# Patient Record
Sex: FEMALE | Race: WHITE | NOT HISPANIC OR LATINO | Employment: UNEMPLOYED | URBAN - METROPOLITAN AREA
[De-identification: names, ages, dates, MRNs, and addresses within clinical notes are randomized per-mention and may not be internally consistent; named-entity substitution may affect disease eponyms.]

---

## 2017-01-09 ENCOUNTER — TELEPHONE (OUTPATIENT)
Dept: OBSTETRICS AND GYNECOLOGY | Age: 22
End: 2017-01-09

## 2017-01-09 NOTE — TELEPHONE ENCOUNTER
----- Message from Laurel Chen sent at 1/9/2017  3:01 PM EST -----  Pt called earlier requesting samples of her BCP. She is on the Lo Loestrin. Please advise.     Pt#: [phone]

## 2017-01-09 NOTE — TELEPHONE ENCOUNTER
It is ok for pt to have sample of loloestrin, due for annual in march,please call pt and she may  if available

## 2017-01-09 NOTE — TELEPHONE ENCOUNTER
----- Message from Ligia Kirby sent at 1/9/2017 10:36 AM EST -----  Dr Starr pt, pt states her pharm is waiting on a PA for her norethindrone-ethinyl estradiol (LOESTRIN 1/20) and would like to know if we have any samples until it is filled. Pharm on file os accurate pls call pt at [phone]

## 2017-01-20 ENCOUNTER — OFFICE VISIT (OUTPATIENT)
Dept: CARDIOLOGY | Facility: CLINIC | Age: 22
End: 2017-01-20

## 2017-01-20 VITALS
SYSTOLIC BLOOD PRESSURE: 112 MMHG | DIASTOLIC BLOOD PRESSURE: 82 MMHG | BODY MASS INDEX: 34.91 KG/M2 | HEIGHT: 63 IN | WEIGHT: 197 LBS | HEART RATE: 70 BPM

## 2017-01-20 DIAGNOSIS — R00.2 PALPITATIONS: Primary | ICD-10-CM

## 2017-01-20 PROCEDURE — 99204 OFFICE O/P NEW MOD 45 MIN: CPT | Performed by: INTERNAL MEDICINE

## 2017-01-20 PROCEDURE — 93000 ELECTROCARDIOGRAM COMPLETE: CPT | Performed by: INTERNAL MEDICINE

## 2017-01-20 RX ORDER — BUDESONIDE AND FORMOTEROL FUMARATE DIHYDRATE 160; 4.5 UG/1; UG/1
2 AEROSOL RESPIRATORY (INHALATION)
COMMUNITY
End: 2018-09-24

## 2017-01-20 NOTE — Clinical Note
I accidentally ordered a [facility] holter.  Will you cancel that one and use the regular Holter order?

## 2017-01-20 NOTE — PROGRESS NOTES
"Date of Office Visit: 2017  Encounter Provider: Mk Lopez MD  Place of Service: [facility] CARDIOLOGY  Patient Name: Jana Nix  :1995    Chief Complaint   Patient presents with   • Irregular Heart Beat   :     HPI: Jana Nix is a 21 y.o. female who presents today for the evaluation of palpitations.  She has a long history of intermittent palpitations and was told as a teenager that she had PVC's.  She was evaluated by an adult cardiologist in May 2015, and had a Holter monitor which revealed rare PAC's/PVC's and on ventricular triplet.  She did not have symptoms while the monitor was on.  An echo was recommended but she was unable to get this done.    She has severe asthma, and her bronchodilators make her symptoms worse.  Her palpitations also get worse when her asthma symptoms flare.  She describes a fluttering \"anxious\" sensation in her chest.  She does not have sustained symptoms.  She denies chest pain or syncope.  She has shortness of breath due to her asthma.  Her palpitations come and go, and she has periods where she feels fine.      Past Medical History   Diagnosis Date   • Allergic rhinitis    • Anxiety    • Asthma    • Community acquired pneumonia      2016   • Depression    • PVC (premature ventricular contraction)    • Streptococcal pharyngitis      strep A       Past Surgical History   Procedure Laterality Date   •  section       x2       Social History     Social History   • Marital status: Single     Spouse name: N/A   • Number of children: N/A   • Years of education: N/A     Occupational History   • Not on file.     Social History Main Topics   • Smoking status: Never Smoker   • Smokeless tobacco: Never Used   • Alcohol use Yes      Comment: 1 drink a day   • Drug use: No   • Sexual activity: Not on file     Other Topics Concern   • Not on file     Social History Narrative       Family History   Problem Relation Age of Onset   • " "Hypertension Mother    • Myocarditis Paternal Grandfather    • Clotting disorder Paternal Grandfather        Review of Systems   Constitution: Positive for malaise/fatigue.   HENT: Positive for headaches.    Respiratory: Positive for shortness of breath, snoring and wheezing.    Neurological: Positive for excessive daytime sleepiness.   Psychiatric/Behavioral: Positive for depression. The patient is nervous/anxious.    All other systems reviewed and are negative.      No Known Allergies      Current Outpatient Prescriptions:   •  albuterol (PROVENTIL HFA;VENTOLIN HFA) 108 (90 BASE) MCG/ACT inhaler, Inhale 2 puffs every 4 (four) hours as needed for wheezing., Disp: 1 inhaler, Rfl: 0  •  budesonide-formoterol (SYMBICORT) 160-4.5 MCG/ACT inhaler, Inhale 2 puffs 2 (Two) Times a Day., Disp: , Rfl:   •  buPROPion (WELLBUTRIN) 100 MG tablet, Take 100 mg by mouth 2 (two) times a day., Disp: , Rfl:   •  norethindrone-ethinyl estradiol (LOESTRIN 1/20, 21,) 1-20 MG-MCG per tablet, Take 1 tablet by mouth Daily., Disp: 21 tablet, Rfl: 3  •  Vortioxetine HBr 10 MG tablet, Take  by mouth Daily., Disp: , Rfl:      Objective:     Vitals:    01/20/17 1059   BP: 112/82   Pulse: 70   Weight: 197 lb (89.4 kg)   Height: 63\" (160 cm)     Body mass index is 34.9 kg/(m^2).    Physical Exam   Constitutional: She is oriented to person, place, and time. She appears well-developed and well-nourished.   HENT:   Head: Normocephalic.   Nose: Nose normal.   Mouth/Throat: Oropharynx is clear and moist.   Eyes: Conjunctivae and EOM are normal. Pupils are equal, round, and reactive to light.   Neck: Normal range of motion. No JVD present.   Cardiovascular: Normal rate, regular rhythm, normal heart sounds and intact distal pulses.    No murmur heard.  Pulmonary/Chest: Effort normal and breath sounds normal.   Abdominal: Soft. She exhibits no mass. There is no tenderness.   Musculoskeletal: Normal range of motion. She exhibits no edema. "   Lymphadenopathy:     She has no cervical adenopathy.   Neurological: She is alert and oriented to person, place, and time. No cranial nerve deficit.   Skin: Skin is warm and dry. No rash noted.   Psychiatric: She has a normal mood and affect. Her behavior is normal. Judgment and thought content normal.   Vitals reviewed.        ECG 12 Lead  Date/Time: 1/20/2017 2:05 PM  Performed by: MK LOPEZ  Authorized by: MK LOPEZ   Comparison: compared with previous ECG   Similar to previous ECG  Rhythm: sinus rhythm  Conduction: conduction normal  ST Segments: ST segments normal  T Waves: T waves normal  Other: no other findings  Clinical impression: normal ECG              Assessment:       Diagnosis Plan   1. Palpitations     Adult Transthoracic Echo Complete    Holter Monitor - 24 Hour          Plan:       Ms. Nix has intermittent palpitations which sound like benign ectopics.  I am going to check an echo and a Holter.  If these are unremarkable, then I recommend observation only.  I explained that I generally don't recommend medical therapy for these as the side effects can be worse than the palpitations.  If she did require medical therapy, I would not use a beta blocker due to her asthma.    Her bronchodilators may worsen her palpitations, but they're very necessary.      Sincerely,       Mk Lopez MD

## 2017-01-20 NOTE — LETTER
"2017     Braeden Rider MD  [address]  [facility] [address]    Patient: Jana Nix   YOB: 1995   Date of Visit: 2017       Dear Dr. Adry MD:    Thank you for referring Jana Nix to me for evaluation. Below are the relevant portions of my assessment and plan of care.    If you have questions, please do not hesitate to call me. I look forward to following Jana along with you.         Sincerely,        Mk Lopez MD        CC: No Recipients  Mk Lopez MD  2017  2:09 PM  Signed  Date of Office Visit: 2017  Encounter Provider: Mk Lopez MD  Place of Service: [facility] CARDIOLOGY  Patient Name: Jana Nix  :1995    Chief Complaint   Patient presents with   • Irregular Heart Beat   :     HPI: Jana Nix is a 21 y.o. female who presents today for the evaluation of palpitations.  She has a long history of intermittent palpitations and was told as a teenager that she had PVC's.  She was evaluated by an adult cardiologist in May 2015, and had a Holter monitor which revealed rare PAC's/PVC's and on ventricular triplet.  She did not have symptoms while the monitor was on.  An echo was recommended but she was unable to get this done.    She has severe asthma, and her bronchodilators make her symptoms worse.  Her palpitations also get worse when her asthma symptoms flare.  She describes a fluttering \"anxious\" sensation in her chest.  She does not have sustained symptoms.  She denies chest pain or syncope.  She has shortness of breath due to her asthma.  Her palpitations come and go, and she has periods where she feels fine.      Past Medical History   Diagnosis Date   • Allergic rhinitis    • Anxiety    • Asthma    • Community acquired pneumonia      2016   • Depression    • PVC (premature ventricular contraction)    • Streptococcal pharyngitis      strep A       Past Surgical History   " "  Procedure Laterality Date   •  section       x2       Social History     Social History   • Marital status: Single     Spouse name: N/A   • Number of children: N/A   • Years of education: N/A     Occupational History   • Not on file.     Social History Main Topics   • Smoking status: Never Smoker   • Smokeless tobacco: Never Used   • Alcohol use Yes      Comment: 1 drink a day   • Drug use: No   • Sexual activity: Not on file     Other Topics Concern   • Not on file     Social History Narrative       Family History   Problem Relation Age of Onset   • Hypertension Mother    • Myocarditis Paternal Grandfather    • Clotting disorder Paternal Grandfather        Review of Systems   Constitution: Positive for malaise/fatigue.   HENT: Positive for headaches.    Respiratory: Positive for shortness of breath, snoring and wheezing.    Neurological: Positive for excessive daytime sleepiness.   Psychiatric/Behavioral: Positive for depression. The patient is nervous/anxious.    All other systems reviewed and are negative.      No Known Allergies      Current Outpatient Prescriptions:   •  albuterol (PROVENTIL HFA;VENTOLIN HFA) 108 (90 BASE) MCG/ACT inhaler, Inhale 2 puffs every 4 (four) hours as needed for wheezing., Disp: 1 inhaler, Rfl: 0  •  budesonide-formoterol (SYMBICORT) 160-4.5 MCG/ACT inhaler, Inhale 2 puffs 2 (Two) Times a Day., Disp: , Rfl:   •  buPROPion (WELLBUTRIN) 100 MG tablet, Take 100 mg by mouth 2 (two) times a day., Disp: , Rfl:   •  norethindrone-ethinyl estradiol (LOESTRIN 1/20, 21,) 1-20 MG-MCG per tablet, Take 1 tablet by mouth Daily., Disp: 21 tablet, Rfl: 3  •  Vortioxetine HBr 10 MG tablet, Take  by mouth Daily., Disp: , Rfl:      Objective:     Vitals:    17 1059   BP: 112/82   Pulse: 70   Weight: 197 lb (89.4 kg)   Height: 63\" (160 cm)     Body mass index is 34.9 kg/(m^2).    Physical Exam   Constitutional: She is oriented to person, place, and time. She appears well-developed and " well-nourished.   HENT:   Head: Normocephalic.   Nose: Nose normal.   Mouth/Throat: Oropharynx is clear and moist.   Eyes: Conjunctivae and EOM are normal. Pupils are equal, round, and reactive to light.   Neck: Normal range of motion. No JVD present.   Cardiovascular: Normal rate, regular rhythm, normal heart sounds and intact distal pulses.    No murmur heard.  Pulmonary/Chest: Effort normal and breath sounds normal.   Abdominal: Soft. She exhibits no mass. There is no tenderness.   Musculoskeletal: Normal range of motion. She exhibits no edema.   Lymphadenopathy:     She has no cervical adenopathy.   Neurological: She is alert and oriented to person, place, and time. No cranial nerve deficit.   Skin: Skin is warm and dry. No rash noted.   Psychiatric: She has a normal mood and affect. Her behavior is normal. Judgment and thought content normal.   Vitals reviewed.        ECG 12 Lead  Date/Time: 1/20/2017 2:05 PM  Performed by: MK LOPEZ  Authorized by: MK LOPEZ   Comparison: compared with previous ECG   Similar to previous ECG  Rhythm: sinus rhythm  Conduction: conduction normal  ST Segments: ST segments normal  T Waves: T waves normal  Other: no other findings  Clinical impression: normal ECG              Assessment:       Diagnosis Plan   1. Palpitations     Adult Transthoracic Echo Complete    Holter Monitor - 24 Hour          Plan:       Ms. Nix has intermittent palpitations which sound like benign ectopics.  I am going to check an echo and a Holter.  If these are unremarkable, then I recommend observation only.  I explained that I generally don't recommend medical therapy for these as the side effects can be worse than the palpitations.  If she did require medical therapy, I would not use a beta blocker due to her asthma.    Her bronchodilators may worsen her palpitations, but they're very necessary.      Sincerely,       Mk Lopez MD

## 2017-01-31 ENCOUNTER — APPOINTMENT (OUTPATIENT)
Dept: CARDIOLOGY | Facility: HOSPITAL | Age: 22
End: 2017-01-31
Attending: INTERNAL MEDICINE

## 2017-01-31 DIAGNOSIS — R00.2 PALPITATIONS: Primary | ICD-10-CM

## 2017-03-14 ENCOUNTER — OFFICE VISIT (OUTPATIENT)
Dept: OBSTETRICS AND GYNECOLOGY | Age: 22
End: 2017-03-14

## 2017-03-14 VITALS
WEIGHT: 195 LBS | SYSTOLIC BLOOD PRESSURE: 118 MMHG | BODY MASS INDEX: 33.29 KG/M2 | HEIGHT: 64 IN | DIASTOLIC BLOOD PRESSURE: 72 MMHG

## 2017-03-14 DIAGNOSIS — Z30.41 ENCOUNTER FOR SURVEILLANCE OF CONTRACEPTIVE PILLS: ICD-10-CM

## 2017-03-14 DIAGNOSIS — Z11.3 SCREEN FOR STD (SEXUALLY TRANSMITTED DISEASE): ICD-10-CM

## 2017-03-14 DIAGNOSIS — R35.0 URINARY FREQUENCY: ICD-10-CM

## 2017-03-14 DIAGNOSIS — Z01.419 ENCOUNTER FOR GYNECOLOGICAL EXAMINATION WITHOUT ABNORMAL FINDING: Primary | ICD-10-CM

## 2017-03-14 LAB
B-HCG UR QL: NEGATIVE
INTERNAL NEGATIVE CONTROL: NEGATIVE
INTERNAL POSITIVE CONTROL: POSITIVE
Lab: NORMAL

## 2017-03-14 PROCEDURE — 81025 URINE PREGNANCY TEST: CPT | Performed by: OBSTETRICS & GYNECOLOGY

## 2017-03-14 PROCEDURE — 99395 PREV VISIT EST AGE 18-39: CPT | Performed by: OBSTETRICS & GYNECOLOGY

## 2017-03-14 RX ORDER — NORETHINDRONE ACETATE AND ETHINYL ESTRADIOL AND FERROUS FUMARATE 1MG-20(24)
1 KIT ORAL DAILY
Qty: 28 TABLET | Refills: 12 | Status: SHIPPED | OUTPATIENT
Start: 2017-03-14 | End: 2018-03-14

## 2017-03-14 NOTE — PROGRESS NOTES
"Subjective     Chief Complaint   Patient presents with   • Annual Exam     PT DOING WELL, HERE FOR ANNUAL EXAM.       History of Present Illness    Jana Nix is a 22 y.o.  who presents for annual exam.  Menses are irregular with loloestrin, occasionally misses cycle but then will have breakthrough bleeding, likes ocp's and wants to continue but may want to try another pill  Obstetric History:  OB History      Para Term  AB TAB SAB Ectopic Multiple Living    2 2 1       2         Menstrual History:     Patient's last menstrual period was 2017 (exact date).         Current contraception: OCP (estrogen/progesterone)  History of abnormal Pap smear: no  Received Gardasil immunization: yes -    Perform regular self breast exam: yes -    Family history of uterine or ovarian cancer: no  Family History of colon cancer: no  Family history of breast cancer: no    Mammogram: not indicated.  Colonoscopy: not indicated.  DEXA: not indicated.    Exercise: moderately active      The following portions of the patient's history were reviewed and updated as appropriate: allergies, current medications, past family history, past medical history, past social history, past surgical history and problem list.    Review of Systems    A comprehensive review of systems was negative except for: Genitourinary: positive for frequency and irregular menses with loloestrin     Objective   Physical Exam    Visit Vitals   • /72   • Ht 64\" (162.6 cm)   • Wt 195 lb (88.5 kg)   • LMP 2017 (Exact Date)   • Breastfeeding No   • BMI 33.47 kg/m2       General:   alert, appears stated age, cooperative and no distress   Neck: no asymmetry, masses, or scars and thyroid normal to palpation   Heart: regular rate and rhythm, S1, S2 normal, no murmur, click, rub or gallop   Lungs: clear to auscultation bilaterally   Abdomen: soft, non-tender, without masses or organomegaly   Breast: inspection negative, no nipple " discharge or bleeding, no masses or nodularity palpable   Vulva: normal, Bartholin's, Urethra, Gasquet's normal   Vagina: normal mucosa, normal discharge   Cervix: no lesions   Uterus: normal size, mobile, non-tender, normal shape and consistency   Adnexa: normal adnexa and no mass, fullness, tenderness   Rectal: not indicated     Assessment/Plan   Jana was seen today for annual exam.    Diagnoses and all orders for this visit:    Encounter for gynecological examination without abnormal finding  -     IGP, CtNg, Rfx Aptima HPV ASCU    Screen for STD (sexually transmitted disease)  -     IGP, CtNg, Rfx Aptima HPV ASCU    Encounter for surveillance of contraceptive pills  -     norethindrone-ethinyl estradiol-ferrous fumarate (LOESTIN 24 FE) 1-20 MG-MCG(24) per tablet; Take 1 tablet by mouth Daily.    Urinary frequency  -     Urine Culture        All questions answered.  Breast self exam technique reviewed and patient encouraged to perform self-exam monthly.  Discussed healthy lifestyle modifications.  Recommended 30 minutes of aerobic exercise five times per week.    Pt aware that ocp's have risks of DVT/PE/CVE/HTN and gallbladder disease, will try lo estrin 24 and follow

## 2017-03-16 ENCOUNTER — TELEPHONE (OUTPATIENT)
Dept: OBSTETRICS AND GYNECOLOGY | Age: 22
End: 2017-03-16

## 2017-03-16 LAB
BACTERIA UR CULT: NORMAL
BACTERIA UR CULT: NORMAL

## 2017-03-16 NOTE — TELEPHONE ENCOUNTER
----- Message from Angeline Patel MD sent at 3/16/2017  6:59 AM EDT -----  Urine culture negative, routed to call room to notify pt

## 2017-03-17 LAB
C TRACH RRNA CVX QL NAA+PROBE: NEGATIVE
CONV .: NORMAL
CYTOLOGIST CVX/VAG CYTO: NORMAL
CYTOLOGY CVX/VAG DOC THIN PREP: NORMAL
DX ICD CODE: NORMAL
HIV 1 & 2 AB SER-IMP: NORMAL
N GONORRHOEA RRNA CVX QL NAA+PROBE: NEGATIVE
OTHER STN SPEC: NORMAL
PATH REPORT.FINAL DX SPEC: NORMAL
STAT OF ADQ CVX/VAG CYTO-IMP: NORMAL

## 2017-03-20 ENCOUNTER — TELEPHONE (OUTPATIENT)
Dept: OBSTETRICS AND GYNECOLOGY | Age: 22
End: 2017-03-20

## 2017-03-20 NOTE — TELEPHONE ENCOUNTER
----- Message from Angeline Patel MD sent at 3/17/2017  6:26 PM EDT -----  Pap and cultures are negative, routed to MA to notify pt

## 2017-05-05 RX ORDER — AZITHROMYCIN 250 MG/1
TABLET, FILM COATED ORAL
Qty: 6 TABLET | Refills: 0 | Status: SHIPPED | OUTPATIENT
Start: 2017-05-05 | End: 2018-08-29

## 2017-06-20 ENCOUNTER — TELEPHONE (OUTPATIENT)
Dept: OBSTETRICS AND GYNECOLOGY | Age: 22
End: 2017-06-20

## 2017-06-20 NOTE — TELEPHONE ENCOUNTER
I tried to call and no answer, please advise pt when she calls back that she will have to wait for next menses to re-start ocp's, advise she abstain from intercourse and call for appt if she fails to re-start, check preg test and call if positive

## 2017-06-20 NOTE — TELEPHONE ENCOUNTER
Pt states she has been on Loestrin 24 FE since March and has not had a period for the last 2 months. Pt states she was a week late picking up her OCP this month and has not had any spotting (has not started yet, advised pt not to start right now) pt has taken a UPT today that was negative. States very irreg periods and not sure when to start pack. Please advise.    Pt # [phone]

## 2017-06-27 RX ORDER — DOXYCYCLINE HYCLATE 100 MG/1
100 CAPSULE ORAL 2 TIMES DAILY
Qty: 20 CAPSULE | Refills: 0 | Status: SHIPPED | OUTPATIENT
Start: 2017-06-27 | End: 2018-08-29

## 2017-07-14 ENCOUNTER — TELEPHONE (OUTPATIENT)
Dept: OBSTETRICS AND GYNECOLOGY | Age: 22
End: 2017-07-14

## 2017-07-14 ENCOUNTER — APPOINTMENT (OUTPATIENT)
Dept: LAB | Facility: HOSPITAL | Age: 22
End: 2017-07-14

## 2017-07-14 DIAGNOSIS — N92.6 MISSED MENSES: Primary | ICD-10-CM

## 2017-07-14 PROCEDURE — 84702 CHORIONIC GONADOTROPIN TEST: CPT | Performed by: OBSTETRICS & GYNECOLOGY

## 2017-07-14 PROCEDURE — 36415 COLL VENOUS BLD VENIPUNCTURE: CPT | Performed by: OBSTETRICS & GYNECOLOGY

## 2017-07-14 NOTE — TELEPHONE ENCOUNTER
Pt was on ocp's but forgot to re-start her pack in late June and so had to wait for next cycle to start. She has not had cycle yet. She has hx of regular menses except when she was on nexplanon. Advised she come in for serum hcg today, she has had negative urine tests at home.

## 2017-07-16 LAB — HCG INTACT+B SERPL-ACNC: <1 MIU/ML

## 2017-07-17 ENCOUNTER — TELEPHONE (OUTPATIENT)
Dept: OBSTETRICS AND GYNECOLOGY | Age: 22
End: 2017-07-17

## 2017-07-17 NOTE — TELEPHONE ENCOUNTER
----- Message from Angeline Patel MD sent at 7/16/2017  8:43 AM EDT -----  Call pt, serum hcg is negative

## 2017-08-14 RX ORDER — NORETHINDRONE ACETATE AND ETHINYL ESTRADIOL 1; 20 MG/1; UG/1
TABLET ORAL
Qty: 21 TABLET | Refills: 2 | Status: SHIPPED | OUTPATIENT
Start: 2017-08-14 | End: 2017-11-14 | Stop reason: SDUPTHER

## 2017-08-29 RX ORDER — AZITHROMYCIN 250 MG/1
TABLET, FILM COATED ORAL
Qty: 6 TABLET | Refills: 0 | Status: SHIPPED | OUTPATIENT
Start: 2017-08-29 | End: 2018-08-29

## 2017-11-14 RX ORDER — NORETHINDRONE ACETATE AND ETHINYL ESTRADIOL 1; 20 MG/1; UG/1
TABLET ORAL
Qty: 21 TABLET | Refills: 1 | Status: SHIPPED | OUTPATIENT
Start: 2017-11-14 | End: 2018-02-05 | Stop reason: SDUPTHER

## 2018-02-06 RX ORDER — NORETHINDRONE ACETATE AND ETHINYL ESTRADIOL 1; 20 MG/1; UG/1
TABLET ORAL
Qty: 21 TABLET | Refills: 0 | Status: SHIPPED | OUTPATIENT
Start: 2018-02-06 | End: 2018-03-07 | Stop reason: SDUPTHER

## 2018-03-07 RX ORDER — NORETHINDRONE ACETATE AND ETHINYL ESTRADIOL 1; 20 MG/1; UG/1
TABLET ORAL
Qty: 21 TABLET | Refills: 2 | Status: SHIPPED | OUTPATIENT
Start: 2018-03-07 | End: 2018-09-24

## 2018-05-18 ENCOUNTER — TRANSCRIBE ORDERS (OUTPATIENT)
Dept: LAB | Facility: HOSPITAL | Age: 23
End: 2018-05-18

## 2018-05-18 ENCOUNTER — LAB (OUTPATIENT)
Dept: LAB | Facility: HOSPITAL | Age: 23
End: 2018-05-18

## 2018-05-18 DIAGNOSIS — B35.1 DERMATOPHYTOSIS OF NAIL: Primary | ICD-10-CM

## 2018-05-18 DIAGNOSIS — B35.1 DERMATOPHYTOSIS OF NAIL: ICD-10-CM

## 2018-05-18 LAB
ALT SERPL W P-5'-P-CCNC: 10 U/L (ref 1–33)
AST SERPL-CCNC: 18 U/L (ref 1–32)
CREAT BLD-MCNC: 0.81 MG/DL (ref 0.57–1)
GFR SERPL CREATININE-BSD FRML MDRD: 88 ML/MIN/1.73

## 2018-05-18 PROCEDURE — 36415 COLL VENOUS BLD VENIPUNCTURE: CPT

## 2018-05-18 PROCEDURE — 84450 TRANSFERASE (AST) (SGOT): CPT

## 2018-05-18 PROCEDURE — 82565 ASSAY OF CREATININE: CPT

## 2018-05-18 PROCEDURE — 84460 ALANINE AMINO (ALT) (SGPT): CPT

## 2018-06-26 DIAGNOSIS — R53.83 OTHER FATIGUE: ICD-10-CM

## 2018-06-26 DIAGNOSIS — E16.2 HYPOGLYCEMIA: Primary | ICD-10-CM

## 2018-06-27 LAB
ALBUMIN SERPL-MCNC: 4.4 G/DL (ref 3.5–5.2)
ALBUMIN/GLOB SERPL: 2 G/DL
ALP SERPL-CCNC: 82 U/L (ref 39–117)
ALT SERPL-CCNC: 13 U/L (ref 1–33)
AST SERPL-CCNC: 7 U/L (ref 1–32)
BASOPHILS # BLD AUTO: 0.02 10*3/MM3 (ref 0–0.2)
BASOPHILS NFR BLD AUTO: 0.2 % (ref 0–1.5)
BILIRUB SERPL-MCNC: 0.3 MG/DL (ref 0.1–1.2)
BUN SERPL-MCNC: 9 MG/DL (ref 6–20)
BUN/CREAT SERPL: 12.2 (ref 7–25)
CALCIUM SERPL-MCNC: 9.4 MG/DL (ref 8.6–10.5)
CHLORIDE SERPL-SCNC: 102 MMOL/L (ref 98–107)
CO2 SERPL-SCNC: 26.9 MMOL/L (ref 22–29)
CREAT SERPL-MCNC: 0.74 MG/DL (ref 0.57–1)
EOSINOPHIL # BLD AUTO: 0.87 10*3/MM3 (ref 0–0.7)
EOSINOPHIL NFR BLD AUTO: 10.2 % (ref 0.3–6.2)
ERYTHROCYTE [DISTWIDTH] IN BLOOD BY AUTOMATED COUNT: 13.2 % (ref 11.7–13)
GLOBULIN SER CALC-MCNC: 2.2 GM/DL
GLUCOSE SERPL-MCNC: 87 MG/DL (ref 65–99)
HBA1C MFR BLD: 5 % (ref 4.8–5.6)
HCT VFR BLD AUTO: 39.2 % (ref 35.6–45.5)
HGB BLD-MCNC: 13.2 G/DL (ref 11.9–15.5)
IMM GRANULOCYTES # BLD: 0.02 10*3/MM3 (ref 0–0.03)
IMM GRANULOCYTES NFR BLD: 0.2 % (ref 0–0.5)
LYMPHOCYTES # BLD AUTO: 2.47 10*3/MM3 (ref 0.9–4.8)
LYMPHOCYTES NFR BLD AUTO: 29.1 % (ref 19.6–45.3)
MCH RBC QN AUTO: 28.6 PG (ref 26.9–32)
MCHC RBC AUTO-ENTMCNC: 33.7 G/DL (ref 32.4–36.3)
MCV RBC AUTO: 84.8 FL (ref 80.5–98.2)
MONOCYTES # BLD AUTO: 0.58 10*3/MM3 (ref 0.2–1.2)
MONOCYTES NFR BLD AUTO: 6.8 % (ref 5–12)
NEUTROPHILS # BLD AUTO: 4.56 10*3/MM3 (ref 1.9–8.1)
NEUTROPHILS NFR BLD AUTO: 53.7 % (ref 42.7–76)
PLATELET # BLD AUTO: 300 10*3/MM3 (ref 140–500)
POTASSIUM SERPL-SCNC: 3.9 MMOL/L (ref 3.5–5.2)
PROT SERPL-MCNC: 6.6 G/DL (ref 6–8.5)
RBC # BLD AUTO: 4.62 10*6/MM3 (ref 3.9–5.2)
SODIUM SERPL-SCNC: 142 MMOL/L (ref 136–145)
T4 SERPL-MCNC: 8.08 MCG/DL (ref 4.5–11.7)
TSH SERPL DL<=0.005 MIU/L-ACNC: 0.93 MIU/ML (ref 0.27–4.2)
WBC # BLD AUTO: 8.5 10*3/MM3 (ref 4.5–10.7)

## 2018-07-01 ENCOUNTER — RESULTS ENCOUNTER (OUTPATIENT)
Dept: INTERNAL MEDICINE | Facility: CLINIC | Age: 23
End: 2018-07-01

## 2018-07-01 DIAGNOSIS — E16.2 HYPOGLYCEMIA: ICD-10-CM

## 2018-07-01 DIAGNOSIS — R53.83 OTHER FATIGUE: ICD-10-CM

## 2018-08-29 RX ORDER — AZITHROMYCIN 250 MG/1
TABLET, FILM COATED ORAL
Qty: 6 TABLET | Refills: 0 | Status: SHIPPED | OUTPATIENT
Start: 2018-08-29 | End: 2018-09-24

## 2018-09-04 RX ORDER — DOXYCYCLINE HYCLATE 100 MG/1
100 CAPSULE ORAL 2 TIMES DAILY
Qty: 20 CAPSULE | Refills: 0 | Status: SHIPPED | OUTPATIENT
Start: 2018-09-04 | End: 2018-09-24

## 2018-09-12 DIAGNOSIS — J45.909 UNCOMPLICATED ASTHMA: ICD-10-CM

## 2018-09-19 DIAGNOSIS — J45.909 UNCOMPLICATED ASTHMA: ICD-10-CM

## 2018-09-24 ENCOUNTER — TELEPHONE (OUTPATIENT)
Dept: OBSTETRICS AND GYNECOLOGY | Age: 23
End: 2018-09-24

## 2018-09-24 DIAGNOSIS — J45.909 UNCOMPLICATED ASTHMA: ICD-10-CM

## 2018-09-24 RX ORDER — ASCORBIC ACID, CALCIUM CITRATE, IRON, VITAMIN D, DL- ALPHA- TOCOPHEROL ACETATE, THIAMINE, RIBOFLAVIN, NIACINAMIDE, PYRIDOXINE HYDROCHLORIDE, FOLIC ACID, IODINE, ZINC, COPPER, DOCUSATE SODIUM, DOCONEXENT AND ICOSAPENT
1 KIT DAILY
Qty: 30 TABLET | Refills: 12 | Status: SHIPPED | OUTPATIENT
Start: 2018-09-24 | End: 2019-09-24

## 2018-09-24 RX ORDER — ALBUTEROL SULFATE 90 UG/1
2 AEROSOL, METERED RESPIRATORY (INHALATION)
COMMUNITY
End: 2018-12-18 | Stop reason: ALTCHOICE

## 2018-09-24 RX ORDER — LORATADINE 10 MG/1
10 TABLET ORAL
COMMUNITY
End: 2021-07-06

## 2018-09-24 NOTE — TELEPHONE ENCOUNTER
Called pt to verify medication. Since preg she notes only taking asthma medication, no psych medication or antibiotics. She denies any issues currently and has appt next week

## 2018-09-25 ENCOUNTER — TELEPHONE (OUTPATIENT)
Dept: OBSTETRICS AND GYNECOLOGY | Age: 23
End: 2018-09-25

## 2018-09-25 NOTE — TELEPHONE ENCOUNTER
Dr Starr Rx'd citranatal pre lisa vits. Pt is inquiring if there was a specific reason to be on this pnv, ins will cover and pt did get a coupon but cost is 20.00. Please advise

## 2018-09-25 NOTE — TELEPHONE ENCOUNTER
No, pt requested we send a rx and this is often preferred of the branded PNV; it also includes DHA

## 2018-10-01 ENCOUNTER — PROCEDURE VISIT (OUTPATIENT)
Dept: OBSTETRICS AND GYNECOLOGY | Age: 23
End: 2018-10-01

## 2018-10-01 ENCOUNTER — OFFICE VISIT (OUTPATIENT)
Dept: OBSTETRICS AND GYNECOLOGY | Age: 23
End: 2018-10-01

## 2018-10-01 VITALS
WEIGHT: 195 LBS | SYSTOLIC BLOOD PRESSURE: 120 MMHG | HEIGHT: 64 IN | DIASTOLIC BLOOD PRESSURE: 70 MMHG | BODY MASS INDEX: 33.29 KG/M2

## 2018-10-01 DIAGNOSIS — Z11.3 SCREEN FOR STD (SEXUALLY TRANSMITTED DISEASE): ICD-10-CM

## 2018-10-01 DIAGNOSIS — O36.80X0 ENCOUNTER TO DETERMINE FETAL VIABILITY OF PREGNANCY, SINGLE OR UNSPECIFIED FETUS: Primary | ICD-10-CM

## 2018-10-01 DIAGNOSIS — Z13.29 THYROID DISORDER SCREEN: ICD-10-CM

## 2018-10-01 DIAGNOSIS — Z01.419 NORMAL GYNECOLOGIC EXAMINATION: Primary | ICD-10-CM

## 2018-10-01 DIAGNOSIS — Z34.90 PREGNANCY, UNSPECIFIED GESTATIONAL AGE: ICD-10-CM

## 2018-10-01 PROBLEM — O34.219 PREVIOUS CESAREAN DELIVERY AFFECTING PREGNANCY: Status: ACTIVE | Noted: 2018-10-01

## 2018-10-01 PROBLEM — O14.90 PRE-ECLAMPSIA, ANTEPARTUM: Status: ACTIVE | Noted: 2018-10-01

## 2018-10-01 LAB
C TRACH RRNA SPEC DONR QL NAA+PROBE: NEGATIVE
N GONORRHOEA DNA SPEC QL NAA+PROBE: NEGATIVE

## 2018-10-01 PROCEDURE — 99395 PREV VISIT EST AGE 18-39: CPT | Performed by: OBSTETRICS & GYNECOLOGY

## 2018-10-01 PROCEDURE — 76817 TRANSVAGINAL US OBSTETRIC: CPT | Performed by: OBSTETRICS & GYNECOLOGY

## 2018-10-01 PROCEDURE — 99213 OFFICE O/P EST LOW 20 MIN: CPT | Performed by: OBSTETRICS & GYNECOLOGY

## 2018-10-01 NOTE — PROGRESS NOTES
"Subjective     Chief Complaint   Patient presents with   • Gynecologic Exam     pregnancy confirmation       History of Present Illness    Jana Nix is a 23 y.o.  who presents for preg confirmation and is also due for annual exam.  Her LMP is 18 and she has had some mild n/v; she denies pelvic pain or bleeding  Obstetric History:  OB History      Para Term  AB Living    2 2 1     2    SAB TAB Ectopic Molar Multiple Live Births              2         Menstrual History:     Patient's last menstrual period was 2018 (exact date).         Current contraception: none/ pregnant  History of abnormal Pap smear: no  Received Gardasil immunization: no  Perform regular self breast exam: no  Family history of uterine or ovarian cancer: no  Family History of colon cancer: no  Family history of breast cancer: yes - great aunts    Mammogram: not indicated.  Colonoscopy: not indicated.  DEXA: not indicated.    Exercise: moderately active  Calcium/Vitamin D: adequate intake    The following portions of the patient's history were reviewed and updated as appropriate: allergies, current medications, past family history, past medical history, past social history, past surgical history and problem list.    Review of Systems    Review of Systems   Constitutional: Negative for fatigue.   Respiratory: Negative for shortness of breath.    Gastrointestinal: Negative for abdominal pain. pos for mild nausea  Genitourinary: Negative for dysuria.   Neurological: Negative for headaches.   Psychiatric/Behavioral: Negative for dysphoric mood.         Objective   Physical Exam    /70   Ht 162.6 cm (64\")   Wt 88.5 kg (195 lb)   LMP 2018 (Exact Date)   BMI 33.47 kg/m²     General:   alert, appears stated age, cooperative and no distress   Neck: no asymmetry, masses, or scars and thyroid normal to palpation   Heart: regular rate and rhythm, S1, S2 normal, no murmur, click, rub or gallop   Lungs: clear " to auscultation bilaterally   Abdomen: soft, non-tender, without masses or organomegaly   Breast: inspection negative, no nipple discharge or bleeding, no masses or nodularity palpable and no axillary adenopathy   Vulva: no lesions, normal Bartholins   Vagina: normal mucosa, normal discharge   Cervix: no lesions   Uterus: 7 weeks size   Adnexa: normal adnexa and no mass, fullness, tenderness   Rectal: not indicated     Assessment/Plan   Jana was seen today for gynecologic exam.    Diagnoses and all orders for this visit:    Normal gynecologic examination  -     IGP, CtNg, Rfx Aptima HPV ASCU    Screen for STD (sexually transmitted disease)  -     IGP, CtNg, Rfx Aptima HPV ASCU    Pregnancy, unspecified gestational age  -     Urine Culture - Urine, Urine, Clean Catch  -     OB Panel With HIV    Thyroid disorder screen  -     TSH        All questions answered.  Breast self exam technique reviewed and patient encouraged to perform self-exam monthly.  Discussed healthy lifestyle modifications.  Recommended 30 minutes of aerobic exercise five times per week.    Will proceed with u/s today to confirm dates  Recommend baby asa at 13+ weeks due to hx preeclampsia    Pt with hx of c/s X 2. Advised that we could offer repeat c/s. If she desires , she will research other MD offices that would offer with her history. Discussed risks of repeat c/s to include bleeding, infection, damage to organs, DVT/PE and even death. However, reviewed risks of . Pt will consider but currently plans to proceed here. Advised f/u 3 weeks for OB intake  Reviewed prenatal guidelines and zika warnings    Addendum: u/s done and IUP noted 8 weeks and dates confirmed

## 2018-10-02 LAB
ABO GROUP BLD: (no result)
BASOPHILS # BLD AUTO: 0 X10E3/UL (ref 0–0.2)
BASOPHILS NFR BLD AUTO: 0 %
BLD GP AB SCN SERPL QL: (no result)
BLD GP AB SCN SERPL QL: POSITIVE
BLOOD GROUP ANTIBODIES SERPL: ABNORMAL
EOSINOPHIL # BLD AUTO: 0.5 X10E3/UL (ref 0–0.4)
EOSINOPHIL NFR BLD AUTO: 6 %
ERYTHROCYTE [DISTWIDTH] IN BLOOD BY AUTOMATED COUNT: 14.8 % (ref 12.3–15.4)
HBV SURFACE AG SERPL QL IA: NEGATIVE
HCT VFR BLD AUTO: 38.1 % (ref 34–46.6)
HCV AB S/CO SERPL IA: <0.1 S/CO RATIO (ref 0–0.9)
HGB BLD-MCNC: 12.5 G/DL (ref 11.1–15.9)
HIV 1+2 AB+HIV1 P24 AG SERPL QL IA: NON REACTIVE
IMM GRANULOCYTES # BLD: 0 X10E3/UL (ref 0–0.1)
IMM GRANULOCYTES NFR BLD: 0 %
LYMPHOCYTES # BLD AUTO: 1.8 X10E3/UL (ref 0.7–3.1)
LYMPHOCYTES NFR BLD AUTO: 22 %
MCH RBC QN AUTO: 26.9 PG (ref 26.6–33)
MCHC RBC AUTO-ENTMCNC: 32.8 G/DL (ref 31.5–35.7)
MCV RBC AUTO: 82 FL (ref 79–97)
MONOCYTES # BLD AUTO: 0.5 X10E3/UL (ref 0.1–0.9)
MONOCYTES NFR BLD AUTO: 6 %
NEUTROPHILS # BLD AUTO: 5.3 X10E3/UL (ref 1.4–7)
NEUTROPHILS NFR BLD AUTO: 66 %
PLATELET # BLD AUTO: 237 X10E3/UL (ref 150–379)
RBC # BLD AUTO: 4.65 X10E6/UL (ref 3.77–5.28)
RH BLD: POSITIVE
RPR SER QL: NON REACTIVE
RUBV IGG SERPL IA-ACNC: 2.91 INDEX
TSH SERPL DL<=0.005 MIU/L-ACNC: 0.9 MIU/ML (ref 0.27–4.2)
WBC # BLD AUTO: 8.1 X10E3/UL (ref 3.4–10.8)
XXX BLOOD GROUP AB TITR SERPL AHG: ABNORMAL {TITER}

## 2018-10-03 ENCOUNTER — TELEPHONE (OUTPATIENT)
Dept: OBSTETRICS AND GYNECOLOGY | Age: 23
End: 2018-10-03

## 2018-10-03 DIAGNOSIS — O36.1990 MATERNAL ATYPICAL ANTIBODY AFFECTING PREGNANCY, ANTEPARTUM, SINGLE OR UNSPECIFIED FETUS: Primary | ICD-10-CM

## 2018-10-03 LAB
BACTERIA UR CULT: NORMAL
BACTERIA UR CULT: NORMAL
C TRACH RRNA CVX QL NAA+PROBE: NEGATIVE
CONV .: NORMAL
CYTOLOGIST CVX/VAG CYTO: NORMAL
CYTOLOGY CVX/VAG DOC THIN PREP: NORMAL
DX ICD CODE: NORMAL
HIV 1 & 2 AB SER-IMP: NORMAL
N GONORRHOEA RRNA CVX QL NAA+PROBE: NEGATIVE
OTHER STN SPEC: NORMAL
PATH REPORT.FINAL DX SPEC: NORMAL
STAT OF ADQ CVX/VAG CYTO-IMP: NORMAL

## 2018-10-03 NOTE — TELEPHONE ENCOUNTER
"Called pt and reviewed labs she is Antibody screen + for anti c. Reviewed that this can cause HDFN. Recommend repeat screen/titer with hospital blood bank and partner testing for antigen status and zygosity for \"c\". Pt understands and will have partner go in for testing. Orders placed for repeat testing at [facility].  "

## 2018-10-04 ENCOUNTER — TELEPHONE (OUTPATIENT)
Dept: OBSTETRICS AND GYNECOLOGY | Age: 23
End: 2018-10-04

## 2018-10-04 ENCOUNTER — APPOINTMENT (OUTPATIENT)
Dept: LAB | Facility: HOSPITAL | Age: 23
End: 2018-10-04

## 2018-10-04 LAB
A AB TITR SERPL: NORMAL {TITER}
ABO GROUP BLD: NORMAL
ANTI-LITTLE C: NORMAL
BLD GP AB SCN SERPL QL: POSITIVE
LITTLE C: NEGATIVE
RH BLD: POSITIVE
T&S EXPIRATION DATE: NORMAL

## 2018-10-04 PROCEDURE — 86870 RBC ANTIBODY IDENTIFICATION: CPT | Performed by: OBSTETRICS & GYNECOLOGY

## 2018-10-04 PROCEDURE — 86901 BLOOD TYPING SEROLOGIC RH(D): CPT | Performed by: OBSTETRICS & GYNECOLOGY

## 2018-10-04 PROCEDURE — 36415 COLL VENOUS BLD VENIPUNCTURE: CPT | Performed by: OBSTETRICS & GYNECOLOGY

## 2018-10-04 PROCEDURE — 86886 COOMBS TEST INDIRECT TITER: CPT | Performed by: OBSTETRICS & GYNECOLOGY

## 2018-10-04 PROCEDURE — 86900 BLOOD TYPING SEROLOGIC ABO: CPT | Performed by: OBSTETRICS & GYNECOLOGY

## 2018-10-04 PROCEDURE — 86850 RBC ANTIBODY SCREEN: CPT | Performed by: OBSTETRICS & GYNECOLOGY

## 2018-10-04 PROCEDURE — 86905 BLOOD TYPING RBC ANTIGENS: CPT | Performed by: OBSTETRICS & GYNECOLOGY

## 2018-10-04 NOTE — TELEPHONE ENCOUNTER
Spoke with patient but discussed that you would need to review them and talk to her tomorrow regarding the results as they are not something I have ever ordered.  She would like to talk to you tomorrow.

## 2018-10-05 ENCOUNTER — TELEPHONE (OUTPATIENT)
Dept: OBSTETRICS AND GYNECOLOGY | Age: 23
End: 2018-10-05

## 2018-10-05 DIAGNOSIS — O36.1990 MATERNAL ATYPICAL ANTIBODY AFFECTING PREGNANCY, ANTEPARTUM, SINGLE OR UNSPECIFIED FETUS: Primary | ICD-10-CM

## 2018-10-05 NOTE — TELEPHONE ENCOUNTER
Called pt regarding persistent positive ab titer. Now at 2. Will refer to MFM and pt agrees. Will refer to [facility] as pt was with Dr. Liz previously and requests.

## 2018-10-15 ENCOUNTER — TELEPHONE (OUTPATIENT)
Dept: OBSTETRICS AND GYNECOLOGY | Age: 23
End: 2018-10-15

## 2018-10-15 DIAGNOSIS — O36.1990 MATERNAL ATYPICAL ANTIBODY AFFECTING PREGNANCY, ANTEPARTUM, SINGLE OR UNSPECIFIED FETUS: Primary | ICD-10-CM

## 2018-10-15 NOTE — TELEPHONE ENCOUNTER
Called pt and advised that partner antigen testing is still needed. He has not proceeded yet due to insurance issues. Pt has appt on 10/25 with MFM. Advised titers can be repeated every 2 to 4 weeks so placed another titer for her to have 2 weeks from prior.

## 2018-10-17 ENCOUNTER — TELEPHONE (OUTPATIENT)
Dept: OBSTETRICS AND GYNECOLOGY | Age: 23
End: 2018-10-17

## 2018-10-17 NOTE — TELEPHONE ENCOUNTER
Called pt and reviewed that test is for patient antigen type and it needs to be for lou [place]ona pt that I have placed call to blood bank and outpatient to try to order verbally for partner. She is also going to check with his MD to have him place order in epic.

## 2018-10-17 NOTE — TELEPHONE ENCOUNTER
Patient's partner is trying to get in with his PCP and his Doctor doesn't know exactly what type of testing he is needing to order for him. Patient works at his partners Drs PCP so can call patient with the information needed.

## 2018-10-18 ENCOUNTER — APPOINTMENT (OUTPATIENT)
Dept: LAB | Facility: HOSPITAL | Age: 23
End: 2018-10-18

## 2018-10-18 LAB
A AB TITR SERPL: NORMAL {TITER}
ANTI-LITTLE C: NORMAL

## 2018-10-18 PROCEDURE — 86886 COOMBS TEST INDIRECT TITER: CPT | Performed by: OBSTETRICS & GYNECOLOGY

## 2018-10-18 PROCEDURE — 36415 COLL VENOUS BLD VENIPUNCTURE: CPT | Performed by: OBSTETRICS & GYNECOLOGY

## 2018-10-18 PROCEDURE — 86870 RBC ANTIBODY IDENTIFICATION: CPT | Performed by: OBSTETRICS & GYNECOLOGY

## 2018-10-26 ENCOUNTER — INITIAL PRENATAL (OUTPATIENT)
Dept: OBSTETRICS AND GYNECOLOGY | Age: 23
End: 2018-10-26

## 2018-10-26 VITALS — BODY MASS INDEX: 33.75 KG/M2 | SYSTOLIC BLOOD PRESSURE: 110 MMHG | WEIGHT: 196.6 LBS | DIASTOLIC BLOOD PRESSURE: 68 MMHG

## 2018-10-26 DIAGNOSIS — O36.1990 MATERNAL ATYPICAL ANTIBODY AFFECTING PREGNANCY, ANTEPARTUM, SINGLE OR UNSPECIFIED FETUS: ICD-10-CM

## 2018-10-26 DIAGNOSIS — O34.219 PREVIOUS CESAREAN DELIVERY AFFECTING PREGNANCY: ICD-10-CM

## 2018-10-26 DIAGNOSIS — Z3A.11 11 WEEKS GESTATION OF PREGNANCY: Primary | ICD-10-CM

## 2018-10-26 DIAGNOSIS — O14.90 PRE-ECLAMPSIA, ANTEPARTUM: ICD-10-CM

## 2018-10-26 PROBLEM — J45.30 MILD PERSISTENT ASTHMA WITHOUT COMPLICATION: Status: ACTIVE | Noted: 2018-10-26

## 2018-10-26 LAB
EXTERNAL CYSTIC FIBROSIS: NORMAL
EXTERNAL NIPT: NORMAL
VZV IGG SER QL: NORMAL

## 2018-10-26 PROCEDURE — 0502F SUBSEQUENT PRENATAL CARE: CPT | Performed by: OBSTETRICS & GYNECOLOGY

## 2018-10-26 NOTE — PROGRESS NOTES
Pt comes in today for new ob visit, full exam done at prior visit, see note   Pt denies any issues today except mild nausea  Maternal ab +:  She saw MFM yesterday, u/s done and growth normal. Per pt, Dr. Terrazas advised monthly titers at this point, she has scheduled f/u there , order placed for titers mid-November at [facility]  Plan f/u here 3 weeks    Prior c/s X 2: pt desires repeat c/s. Currently she declines tubal. Discussed risks of multiple  deliveries    Pt declines aneuploidy or CF testing    Hx preeclampsia: advised baby asa after 13 weeks, pt agrees    rtc 3 weeks, pt will have flu shot at work

## 2018-10-30 ENCOUNTER — TELEPHONE (OUTPATIENT)
Dept: OBSTETRICS AND GYNECOLOGY | Age: 23
End: 2018-10-30

## 2018-10-30 RX ORDER — AZITHROMYCIN 250 MG/1
TABLET, FILM COATED ORAL
Qty: 6 TABLET | Refills: 0 | Status: SHIPPED | OUTPATIENT
Start: 2018-10-30 | End: 2018-11-05

## 2018-10-30 NOTE — TELEPHONE ENCOUNTER
Patients PCP will not send her in a rx for a sinus infection, has tried OTC meds nothing is helping, but since she is high risk her PCP said she needed to call here.

## 2018-10-30 NOTE — TELEPHONE ENCOUNTER
Called pt after she called with complaints of sinus infection/URI. Patient reports she has used zpack in past and requests this. Her PCP was going to give her a prescription but advised her to call here since she is pregnant. Sent in rx for zpack. Advised pt to go to ER for soa or fevers and she agrees.

## 2018-10-31 ENCOUNTER — TELEPHONE (OUTPATIENT)
Dept: OBSTETRICS AND GYNECOLOGY | Age: 23
End: 2018-10-31

## 2018-11-08 ENCOUNTER — TELEPHONE (OUTPATIENT)
Dept: OBSTETRICS AND GYNECOLOGY | Age: 23
End: 2018-11-08

## 2018-11-08 NOTE — TELEPHONE ENCOUNTER
Called pt regarding her medical questions. She has had a mild frontal headache for a few days. No fever/n/v. Symptoms are worse while working at computer. Discussed that tylenol is acceptable in pregnancy but would not recommend continuous use for patients in general. Rather, would evaluate further for source of headache. Recommended she consider eye exam since symptoms worse while at computer or check with primary MD as she may have a sinus infection. Patient notes understanding of these options.     Also noted pt had placed email question. Reviewed recommended topical treatment for yeast infections. Reviewed that pt did receive copy of suggested medications from group but she notes she did not have list with her today.

## 2018-11-08 NOTE — TELEPHONE ENCOUNTER
Patient has had a migriane for a few days now and didn't want to take tylenol continuous. Patient states its in the middle of her forehead.  Patient is wondering what else she can do?

## 2018-11-14 ENCOUNTER — TELEPHONE (OUTPATIENT)
Dept: OBSTETRICS AND GYNECOLOGY | Age: 23
End: 2018-11-14

## 2018-11-14 ENCOUNTER — LAB (OUTPATIENT)
Dept: LAB | Facility: HOSPITAL | Age: 23
End: 2018-11-14

## 2018-11-14 DIAGNOSIS — O36.1990 MATERNAL ATYPICAL ANTIBODY AFFECTING PREGNANCY, ANTEPARTUM, SINGLE OR UNSPECIFIED FETUS: Primary | ICD-10-CM

## 2018-11-14 DIAGNOSIS — O36.1990 MATERNAL ATYPICAL ANTIBODY AFFECTING PREGNANCY, ANTEPARTUM, SINGLE OR UNSPECIFIED FETUS: ICD-10-CM

## 2018-11-14 PROCEDURE — 36415 COLL VENOUS BLD VENIPUNCTURE: CPT

## 2018-11-14 PROCEDURE — 86886 COOMBS TEST INDIRECT TITER: CPT | Performed by: OBSTETRICS & GYNECOLOGY

## 2018-11-15 LAB — A AB TITR SERPL: NORMAL {TITER}

## 2018-11-15 NOTE — TELEPHONE ENCOUNTER
Called pt regarding her ab titer. Result is increased at 4 so advised repeat in 2 weeks and order placed. Advised pt to call office next day following blood work to review.

## 2018-11-19 ENCOUNTER — ROUTINE PRENATAL (OUTPATIENT)
Dept: OBSTETRICS AND GYNECOLOGY | Age: 23
End: 2018-11-19

## 2018-11-19 VITALS — BODY MASS INDEX: 33.64 KG/M2 | DIASTOLIC BLOOD PRESSURE: 66 MMHG | WEIGHT: 196 LBS | SYSTOLIC BLOOD PRESSURE: 108 MMHG

## 2018-11-19 DIAGNOSIS — Z3A.15 15 WEEKS GESTATION OF PREGNANCY: Primary | ICD-10-CM

## 2018-11-19 DIAGNOSIS — O36.1990 MATERNAL ATYPICAL ANTIBODY AFFECTING PREGNANCY, ANTEPARTUM, SINGLE OR UNSPECIFIED FETUS: ICD-10-CM

## 2018-11-19 DIAGNOSIS — Z36.1 NEED FOR MATERNAL SERUM ALPHA-PROTEIN (MSAFP) SCREENING: ICD-10-CM

## 2018-11-19 DIAGNOSIS — O34.219 PREVIOUS CESAREAN DELIVERY AFFECTING PREGNANCY: ICD-10-CM

## 2018-11-19 DIAGNOSIS — O12.12 PROTEINURIA AFFECTING PREGNANCY IN SECOND TRIMESTER: ICD-10-CM

## 2018-11-19 PROCEDURE — 0502F SUBSEQUENT PRENATAL CARE: CPT | Performed by: OBSTETRICS & GYNECOLOGY

## 2018-11-19 NOTE — PROGRESS NOTES
No issues today, no pain or bleeding, has felt some fetal movement    + little c ab: Reviewed rise in titer to 4; she will repeat again next week since rising level and she has f/u appt with Dr. Terrazas 18. Pt advised to call office to review titer after she has specimen drawn next week at [facility] preeclampsia: pt to start baby asa this week    Protein noted in urine: check repeat urine culture today    Previous : pt plans repeat    Discussed options of AFP or afp 4 today, pt declines aneuploidy testing but desires afp only  Pt will have anatomy u/s with MFM at [facility], plan rtc 4 weeks

## 2018-11-21 LAB
BACTERIA UR CULT: NO GROWTH
BACTERIA UR CULT: NORMAL

## 2018-11-22 LAB
AFP ADJ MOM SERPL: 0.97
AFP INTERP SERPL-IMP: NORMAL
AFP INTERP SERPL-IMP: NORMAL
AFP SERPL-MCNC: 23.1 NG/ML
AGE AT DELIVERY: 24.2 YR
GA METHOD: NORMAL
GA: 15.1 WEEKS
IDDM PATIENT QL: NO
LABORATORY COMMENT REPORT: NORMAL
MULTIPLE PREGNANCY: NO
NEURAL TUBE DEFECT RISK FETUS: NORMAL %
RESULT: NORMAL

## 2018-11-26 ENCOUNTER — TELEPHONE (OUTPATIENT)
Dept: OBSTETRICS AND GYNECOLOGY | Age: 23
End: 2018-11-26

## 2018-11-26 NOTE — TELEPHONE ENCOUNTER
----- Message from Angeline Patel MD sent at 11/25/2018  5:52 PM EST -----  Please call Jana, her AFP is negative/normal

## 2018-11-28 ENCOUNTER — LAB (OUTPATIENT)
Dept: LAB | Facility: HOSPITAL | Age: 23
End: 2018-11-28

## 2018-11-28 DIAGNOSIS — O36.1990 MATERNAL ATYPICAL ANTIBODY AFFECTING PREGNANCY, ANTEPARTUM, SINGLE OR UNSPECIFIED FETUS: ICD-10-CM

## 2018-11-28 LAB — A AB TITR SERPL: NORMAL {TITER}

## 2018-11-28 PROCEDURE — 36415 COLL VENOUS BLD VENIPUNCTURE: CPT

## 2018-11-28 PROCEDURE — 86886 COOMBS TEST INDIRECT TITER: CPT | Performed by: OBSTETRICS & GYNECOLOGY

## 2018-11-29 ENCOUNTER — TELEPHONE (OUTPATIENT)
Dept: OBSTETRICS AND GYNECOLOGY | Age: 23
End: 2018-11-29

## 2018-11-29 DIAGNOSIS — O36.1990 MATERNAL ATYPICAL ANTIBODY AFFECTING PREGNANCY, ANTEPARTUM, SINGLE OR UNSPECIFIED FETUS: Primary | ICD-10-CM

## 2018-11-29 NOTE — TELEPHONE ENCOUNTER
Called pt and reviewed current titer at 2. Will continue Q 2 weeks since she is progressing in pregnancy. She has appt with Dr. Terrazas next week and with me in several weeks. Advised her to call if she does not receive a call within a few days of her blood draw each time. Order placed for Q 2 week ab titer at [facility].

## 2018-12-12 ENCOUNTER — TELEPHONE (OUTPATIENT)
Dept: OBSTETRICS AND GYNECOLOGY | Age: 23
End: 2018-12-12

## 2018-12-12 ENCOUNTER — HOSPITAL ENCOUNTER (EMERGENCY)
Facility: HOSPITAL | Age: 23
Discharge: HOME | End: 2018-12-12
Attending: EMERGENCY MEDICINE | Admitting: EMERGENCY MEDICINE

## 2018-12-12 ENCOUNTER — APPOINTMENT (OUTPATIENT)
Dept: CARDIOLOGY | Facility: HOSPITAL | Age: 23
End: 2018-12-12
Attending: EMERGENCY MEDICINE

## 2018-12-12 ENCOUNTER — TELEPHONE (OUTPATIENT)
Dept: CARDIOLOGY | Facility: CLINIC | Age: 23
End: 2018-12-12

## 2018-12-12 VITALS
TEMPERATURE: 99 F | RESPIRATION RATE: 18 BRPM | WEIGHT: 201 LBS | SYSTOLIC BLOOD PRESSURE: 122 MMHG | HEART RATE: 104 BPM | OXYGEN SATURATION: 94 % | HEIGHT: 63 IN | DIASTOLIC BLOOD PRESSURE: 70 MMHG | BODY MASS INDEX: 35.61 KG/M2

## 2018-12-12 DIAGNOSIS — Z3A.18 PREGNANCY WITH 18 COMPLETED WEEKS GESTATION: ICD-10-CM

## 2018-12-12 DIAGNOSIS — R00.2 PALPITATIONS: Primary | ICD-10-CM

## 2018-12-12 DIAGNOSIS — E86.0 MILD DEHYDRATION: ICD-10-CM

## 2018-12-12 LAB
ALBUMIN SERPL-MCNC: 3.5 G/DL (ref 3.5–5.2)
ALBUMIN/GLOB SERPL: 1.1 G/DL
ALP SERPL-CCNC: 86 U/L (ref 39–117)
ALT SERPL W P-5'-P-CCNC: 44 U/L (ref 1–33)
ANION GAP SERPL CALCULATED.3IONS-SCNC: 13.9 MMOL/L
AST SERPL-CCNC: 31 U/L (ref 1–32)
BASOPHILS # BLD AUTO: 0.01 10*3/MM3 (ref 0–0.2)
BASOPHILS NFR BLD AUTO: 0.1 % (ref 0–1.5)
BILIRUB SERPL-MCNC: 0.2 MG/DL (ref 0.1–1.2)
BILIRUB UR QL STRIP: NEGATIVE
BUN BLD-MCNC: 8 MG/DL (ref 6–20)
BUN/CREAT SERPL: 15.7 (ref 7–25)
CALCIUM SPEC-SCNC: 9.1 MG/DL (ref 8.6–10.5)
CHLORIDE SERPL-SCNC: 102 MMOL/L (ref 98–107)
CLARITY UR: CLEAR
CO2 SERPL-SCNC: 22.1 MMOL/L (ref 22–29)
COLOR UR: YELLOW
CREAT BLD-MCNC: 0.51 MG/DL (ref 0.57–1)
DEPRECATED RDW RBC AUTO: 42.1 FL (ref 37–54)
EOSINOPHIL # BLD AUTO: 0.72 10*3/MM3 (ref 0–0.7)
EOSINOPHIL NFR BLD AUTO: 7.1 % (ref 0.3–6.2)
ERYTHROCYTE [DISTWIDTH] IN BLOOD BY AUTOMATED COUNT: 13.7 % (ref 11.7–13)
GFR SERPL CREATININE-BSD FRML MDRD: 149 ML/MIN/1.73
GLOBULIN UR ELPH-MCNC: 3.1 GM/DL
GLUCOSE BLD-MCNC: 105 MG/DL (ref 65–99)
GLUCOSE UR STRIP-MCNC: NEGATIVE MG/DL
HCT VFR BLD AUTO: 35.8 % (ref 35.6–45.5)
HGB BLD-MCNC: 11.9 G/DL (ref 11.9–15.5)
HGB UR QL STRIP.AUTO: NEGATIVE
IMM GRANULOCYTES # BLD: 0.05 10*3/MM3 (ref 0–0.03)
IMM GRANULOCYTES NFR BLD: 0.5 % (ref 0–0.5)
KETONES UR QL STRIP: ABNORMAL
LEUKOCYTE ESTERASE UR QL STRIP.AUTO: NEGATIVE
LYMPHOCYTES # BLD AUTO: 2.56 10*3/MM3 (ref 0.9–4.8)
LYMPHOCYTES NFR BLD AUTO: 25.1 % (ref 19.6–45.3)
MCH RBC QN AUTO: 28.4 PG (ref 26.9–32)
MCHC RBC AUTO-ENTMCNC: 33.2 G/DL (ref 32.4–36.3)
MCV RBC AUTO: 85.4 FL (ref 80.5–98.2)
MONOCYTES # BLD AUTO: 0.65 10*3/MM3 (ref 0.2–1.2)
MONOCYTES NFR BLD AUTO: 6.4 % (ref 5–12)
NEUTROPHILS # BLD AUTO: 6.22 10*3/MM3 (ref 1.9–8.1)
NEUTROPHILS NFR BLD AUTO: 60.8 % (ref 42.7–76)
NITRITE UR QL STRIP: NEGATIVE
PH UR STRIP.AUTO: 6 [PH] (ref 5–8)
PLATELET # BLD AUTO: 170 10*3/MM3 (ref 140–500)
PMV BLD AUTO: 11.1 FL (ref 6–12)
POTASSIUM BLD-SCNC: 3.3 MMOL/L (ref 3.5–5.2)
PROT SERPL-MCNC: 6.6 G/DL (ref 6–8.5)
PROT UR QL STRIP: NEGATIVE
RBC # BLD AUTO: 4.19 10*6/MM3 (ref 3.9–5.2)
SODIUM BLD-SCNC: 138 MMOL/L (ref 136–145)
SP GR UR STRIP: 1.02 (ref 1–1.03)
TSH SERPL DL<=0.05 MIU/L-ACNC: 1.19 MIU/ML (ref 0.27–4.2)
UROBILINOGEN UR QL STRIP: ABNORMAL
WBC NRBC COR # BLD: 10.21 10*3/MM3 (ref 4.5–10.7)

## 2018-12-12 PROCEDURE — 96360 HYDRATION IV INFUSION INIT: CPT

## 2018-12-12 PROCEDURE — 93010 ELECTROCARDIOGRAM REPORT: CPT | Performed by: INTERNAL MEDICINE

## 2018-12-12 PROCEDURE — 93005 ELECTROCARDIOGRAM TRACING: CPT | Performed by: EMERGENCY MEDICINE

## 2018-12-12 PROCEDURE — 99284 EMERGENCY DEPT VISIT MOD MDM: CPT

## 2018-12-12 PROCEDURE — 81003 URINALYSIS AUTO W/O SCOPE: CPT | Performed by: NURSE PRACTITIONER

## 2018-12-12 PROCEDURE — 93226 XTRNL ECG REC<48 HR SCAN A/R: CPT

## 2018-12-12 PROCEDURE — 93005 ELECTROCARDIOGRAM TRACING: CPT

## 2018-12-12 PROCEDURE — 85025 COMPLETE CBC W/AUTO DIFF WBC: CPT | Performed by: NURSE PRACTITIONER

## 2018-12-12 PROCEDURE — 93225 XTRNL ECG REC<48 HRS REC: CPT

## 2018-12-12 PROCEDURE — 84443 ASSAY THYROID STIM HORMONE: CPT | Performed by: NURSE PRACTITIONER

## 2018-12-12 PROCEDURE — 80053 COMPREHEN METABOLIC PANEL: CPT | Performed by: NURSE PRACTITIONER

## 2018-12-12 RX ORDER — ASPIRIN 81 MG/1
81 TABLET, CHEWABLE ORAL DAILY
COMMUNITY
End: 2020-12-24

## 2018-12-12 RX ORDER — POTASSIUM CHLORIDE 750 MG/1
40 CAPSULE, EXTENDED RELEASE ORAL ONCE
Status: COMPLETED | OUTPATIENT
Start: 2018-12-12 | End: 2018-12-12

## 2018-12-12 RX ADMIN — POTASSIUM CHLORIDE 40 MEQ: 750 CAPSULE, EXTENDED RELEASE ORAL at 22:23

## 2018-12-12 RX ADMIN — SODIUM CHLORIDE 1000 ML: 9 INJECTION, SOLUTION INTRAVENOUS at 21:06

## 2018-12-12 NOTE — TELEPHONE ENCOUNTER
I will not be able to offer any acute advice as I haven't see her in over one year and as she is pregnant.  Would start with her OB.    lemuel jackson

## 2018-12-12 NOTE — TELEPHONE ENCOUNTER
Pt is having palpitations currently and noted heart rate is sustained in 130's. Recommend she go to ER for evaluation.

## 2018-12-12 NOTE — TELEPHONE ENCOUNTER
Pt called to report that she is pregant and her hr has been staying >100 most of the day and 148-150 with exertion and when she stands up along with a headache.   Pt works at [employer].    Pt's last OV was 01/20/17 and you ordered holter and echo at that time but were not completed.  Please advise.     Note: See comments.

## 2018-12-13 ENCOUNTER — LAB (OUTPATIENT)
Dept: LAB | Facility: HOSPITAL | Age: 23
End: 2018-12-13

## 2018-12-13 DIAGNOSIS — O36.1990 MATERNAL ATYPICAL ANTIBODY AFFECTING PREGNANCY, ANTEPARTUM, SINGLE OR UNSPECIFIED FETUS: ICD-10-CM

## 2018-12-13 PROBLEM — O14.90 PRE-ECLAMPSIA, ANTEPARTUM: Status: ACTIVE | Noted: 2018-10-01

## 2018-12-13 LAB — A AB TITR SERPL: NORMAL {TITER}

## 2018-12-13 PROCEDURE — 36415 COLL VENOUS BLD VENIPUNCTURE: CPT

## 2018-12-13 PROCEDURE — 86886 COOMBS TEST INDIRECT TITER: CPT | Performed by: OBSTETRICS & GYNECOLOGY

## 2018-12-13 NOTE — ED PROVIDER NOTES
EMERGENCY DEPARTMENT ENCOUNTER    CHIEF COMPLAINT  Chief Complaint: Palpitations  History given by: Patient  History limited by: None  Room Number: 43/43  PMD: Karlyt, MD Dr. John Tesfaye, cardiologist.    HPI:  Pt is a 23 y.o. female who presents, with hx of PVC's, , at 18 wks gestation, complaining of constant palpitations, with HR between 130-150, exacerbated by standing and sitting up, since 1100 today. Pt also c/o HA, SOA, and cough, upon standing, that resolves within 10-15 minutes, but denies decreased PO, fever, chest pain, vomiting, and congestion. She denies any previous similar episodes during her pregnancy. Pt hx of asthma.    Duration: Since 1100 today  Onset: Gradual  Timing: Constant  Quality: HR between 130-150  Intensity/Severity: Moderate  Progression: Unchanged  Associated Symptoms: HA, SOA, and cough, upon standing, that resolves within 10-15 minutes  Aggravating Factors: Exacerbated by standing and sitting up  Previous Episodes: She denies any previous similar episodes during her pregnancy.  Treatment before arrival: None stated    PAST MEDICAL HISTORY  Active Ambulatory Problems     Diagnosis Date Noted   • Previous  delivery affecting pregnancy 10/01/2018   • Pre-eclampsia, antepartum 10/01/2018   • Maternal atypical antibody complicating pregnancy 10/03/2018   • Mild persistent asthma without complication 10/26/2018     Resolved Ambulatory Problems     Diagnosis Date Noted   • No Resolved Ambulatory Problems     Past Medical History:   Diagnosis Date   • Allergic rhinitis    • Anxiety    • Asthma    • Chlamydia    • Community acquired pneumonia    • Depression    • PVC (premature ventricular contraction)    • Streptococcal pharyngitis        PAST SURGICAL HISTORY  Past Surgical History:   Procedure Laterality Date   •  SECTION      x2       FAMILY HISTORY  Family History   Problem Relation Age of Onset   • Hypertension Mother    • Myocarditis Paternal Grandfather    •  Clotting disorder Paternal Grandfather    • Breast cancer Other        SOCIAL HISTORY  Social History     Socioeconomic History   • Marital status: Single     Spouse name: Not on file   • Number of children: Not on file   • Years of education: Not on file   • Highest education level: Not on file   Social Needs   • Financial resource strain: Not on file   • Food insecurity - worry: Not on file   • Food insecurity - inability: Not on file   • Transportation needs - medical: Not on file   • Transportation needs - non-medical: Not on file   Occupational History   • Not on file   Tobacco Use   • Smoking status: Never Smoker   • Smokeless tobacco: Never Used   Substance and Sexual Activity   • Alcohol use: No     Comment: stopped with pregnancy   • Drug use: No   • Sexual activity: Yes     Partners: Male     Birth control/protection: OCP   Other Topics Concern   • Not on file   Social History Narrative   • Not on file       ALLERGIES  Patient has no known allergies.    REVIEW OF SYSTEMS  Review of Systems   Constitutional: Negative for appetite change and fever.   HENT: Negative for congestion and sore throat.    Eyes: Negative.    Respiratory: Positive for cough (upon standing, that resolves within 10-15 minutesupon standing, that resolves within 10-15 minutes) and shortness of breath.    Cardiovascular: Positive for palpitations (constant with HR between 130-150). Negative for chest pain.   Gastrointestinal: Negative for abdominal pain, diarrhea and vomiting.   Genitourinary: Negative for dysuria.   Musculoskeletal: Negative for neck pain.   Skin: Negative for rash.   Allergic/Immunologic: Negative.    Neurological: Positive for headaches (upon standing, that resolves within 10-15 minutes). Negative for weakness and numbness.   Hematological: Negative.    Psychiatric/Behavioral: Negative.    All other systems reviewed and are negative.      PHYSICAL EXAM  ED Triage Vitals   Temp Heart Rate Resp BP SpO2   12/12/18 1753  12/12/18 1753 12/12/18 1931 12/12/18 1931 12/12/18 1753   99 °F (37.2 °C) (!) 128 16 138/85 98 %      Temp src Heart Rate Source Patient Position BP Location FiO2 (%)   12/12/18 1753 -- -- -- --   Tympanic           Physical Exam   Constitutional: She is oriented to person, place, and time. No distress.   HENT:   Head: Normocephalic and atraumatic.   Eyes: EOM are normal. Pupils are equal, round, and reactive to light.   Neck: Normal range of motion. Neck supple.   Cardiovascular: Normal rate, regular rhythm and normal heart sounds.   Pulmonary/Chest: Effort normal and breath sounds normal. No respiratory distress.   Abdominal: Soft. There is no tenderness. There is no rebound and no guarding.   Musculoskeletal: Normal range of motion. She exhibits no edema.   Neurological: She is alert and oriented to person, place, and time. She has normal sensation and normal strength.   Skin: Skin is warm and dry. No rash noted.   Psychiatric: Mood and affect normal.   Nursing note and vitals reviewed.      LAB RESULTS  Lab Results (last 24 hours)     Procedure Component Value Units Date/Time    Urinalysis With Microscopic If Indicated (No Culture) - Urine, Clean Catch [[id number]]  (Abnormal) Collected:  12/12/18 2028    Specimen:  Urine, Clean Catch Updated:  12/12/18 2106     Color, UA Yellow     Appearance, UA Clear     pH, UA 6.0     Specific Gravity, UA 1.023     Glucose, UA Negative     Ketones, UA 15 mg/dL (1+)     Bilirubin, UA Negative     Blood, UA Negative     Protein, UA Negative     Leuk Esterase, UA Negative     Nitrite, UA Negative     Urobilinogen, UA 0.2 E.U./dL    Narrative:       Urine microscopic not indicated.    CBC & Differential [[id number]] Collected:  12/12/18 2104    Specimen:  Blood Updated:  12/12/18 2129    Narrative:       The following orders were created for panel order CBC & Differential.  Procedure                               Abnormality         Status                     ---------                                -----------         ------                     CBC Auto Differential[[id number]]        Abnormal            Final result                 Please view results for these tests on the individual orders.    Comprehensive Metabolic Panel [[id number]]  (Abnormal) Collected:  12/12/18 2104    Specimen:  Blood Updated:  12/12/18 2156     Glucose 105 mg/dL      BUN 8 mg/dL      Creatinine 0.51 mg/dL      Sodium 138 mmol/L      Potassium 3.3 mmol/L      Chloride 102 mmol/L      CO2 22.1 mmol/L      Calcium 9.1 mg/dL      Total Protein 6.6 g/dL      Albumin 3.50 g/dL      ALT (SGPT) 44 U/L      AST (SGOT) 31 U/L      Alkaline Phosphatase 86 U/L      Total Bilirubin 0.2 mg/dL      eGFR Non African Amer 149 mL/min/1.73      Globulin 3.1 gm/dL      A/G Ratio 1.1 g/dL      BUN/Creatinine Ratio 15.7     Anion Gap 13.9 mmol/L     CBC Auto Differential [[id number]]  (Abnormal) Collected:  12/12/18 2104    Specimen:  Blood Updated:  12/12/18 2129     WBC 10.21 10*3/mm3      RBC 4.19 10*6/mm3      Hemoglobin 11.9 g/dL      Hematocrit 35.8 %      MCV 85.4 fL      MCH 28.4 pg      MCHC 33.2 g/dL      RDW 13.7 %      RDW-SD 42.1 fl      MPV 11.1 fL      Platelets 170 10*3/mm3      Neutrophil % 60.8 %      Lymphocyte % 25.1 %      Monocyte % 6.4 %      Eosinophil % 7.1 %      Basophil % 0.1 %      Immature Grans % 0.5 %      Neutrophils, Absolute 6.22 10*3/mm3      Lymphocytes, Absolute 2.56 10*3/mm3      Monocytes, Absolute 0.65 10*3/mm3      Eosinophils, Absolute 0.72 10*3/mm3      Basophils, Absolute 0.01 10*3/mm3      Immature Grans, Absolute 0.05 10*3/mm3     TSH [[id number]]  (Normal) Collected:  12/12/18 2104    Specimen:  Blood Updated:  12/12/18 2245     TSH 1.190 mIU/mL           I ordered the above labs and reviewed the results    PROCEDURES    EKG interpreted by ED physician.    PROGRESS AND CONSULTS  ED Course as of Dec 12 2252   Wed Dec 12, 2018   2011 ECG 12 Lead [JS]      ED Course User Index  [JS] Juan  Justina Kevin, APRN   2020 Ordered CBC, orthostatic vitals, CMP, and UA for further evaluation. Ordered IVF for hydration.    2114 Ordered TSH for further evaluation.    2125 Discussed pt with Dr. Blackburn, who has performed a bedside evaluation and agrees with plan of care.    2252 Rechecked with pt, who is resting comfortably. I discussed with pt that she had mild dehydration and mild hypokalemia in labs. Plan to discharge with holter monitor. Pt should f/u with her PCP and her cardiologist. Pt understands and agrees with the plan, all questions answered.    MEDICAL DECISION MAKING  Results were reviewed/discussed with the patient and they were also made aware of online access. Pt also made aware that some labs, such as cultures, will not be resulted during ER visit and follow up with PMD is necessary.     MDM  Number of Diagnoses or Management Options  Mild dehydration:   Palpitations:      Amount and/or Complexity of Data Reviewed  Clinical lab tests: ordered and reviewed (UA shows 1+ ketones, Creatinine= 0.51, potassium= 3.3, WBC= 10.21)  Tests in the medicine section of CPT®: reviewed (Interpreted by ED Physician.)  Decide to obtain previous medical records or to obtain history from someone other than the patient: yes    Patient Progress  Patient progress: stable         DIAGNOSIS  Final diagnoses:   Palpitations   Mild dehydration   Pregnancy with 18 completed weeks gestation       DISPOSITION  DISCHARGE    Patient discharged in stable condition.    Reviewed implications of results, diagnosis, meds, responsibility to follow up, warning signs and symptoms of possible worsening, potential complications and reasons to return to ER, including new or worsening sxs.    Patient/Family voiced understanding of above instructions.    Discussed plan for discharge, as there is no emergent indication for admission. Patient referred to primary care provider for BP management due to today's BP. Pt/family is agreeable and  understands need for follow up and repeat testing.  Pt is aware that discharge does not mean that nothing is wrong but it indicates no emergency is present that requires admission and they must continue care with follow-up as given below or physician of their choice.     FOLLOW-UP  Braeden Rider MD  [address]  [place] [address]  [phone]    Call       Mk Lopez MD  [address]  [place] [address]  [phone]    Call       Latest Documented Vital Signs:  As of 10:52 PM  BP- 122/70 HR- 104 Temp- 99 °F (37.2 °C) (Tympanic) O2 sat- 94%    --  Documentation assistance provided by yokasta Jimenez for SYLVIA Lundberg.  Information recorded by the scribe was done at my direction and has been verified and validated by me.       Candy Jimenez  12/12/18 3583       Justina Martinez APRN  12/13/18 2402

## 2018-12-13 NOTE — ED PROVIDER NOTES
MD ATTESTATION NOTE    Pt presents to the ED complaining of palpitations that began around 1100 today. Pt states that her palpitations became worse after standing from a sitting position. Pt denies CP or urinary symptoms. Pt is 18 weeks pregnant. Pt is not orthostatic in the ED. On exam, the pt is resting comfortably and in NAD. The pt's heart is RRR, lungs are CTAB and the pt has no calf tenderness or leg edema. I agree with the plan to to order lab work prior to likely discharging the pt home with a holter monitor.    EKG          EKG time: 1756  Rhythm/Rate: sinus arrhythmia, 105  P waves and PA: normal  QRS, axis: normal   ST and T waves: normal     Interpreted Contemporaneously by me, independently viewed  unchanged compared to prior on 4/17/16 2209- Rechecked pt. Pt is resting comfortably. Notified pt of her lab results, including her mild hypokalemia. Discussed the plan to order potassium for her hypokalemia and to discharge the pt home with a holter monitor. I instructed the pt to follow up with [facility]. Pt understands and agrees with the plan, all questions answered.    The ONEYDA and I have discussed this patient's history, physical exam, and treatment plan.  I have reviewed the documentation and personally had a face to face interaction with the patient. I affirm the documentation and agree with the treatment and plan.  The attached note describes my personal findings.    Documentation assistance provided by yokasta Short for Dr. Blackburn.  Information recorded by the yokasta was done at my direction and has been verified and validated by me.     Jania Short  12/12/18 7444       Jose Blackburn MD  12/13/18 0524

## 2018-12-13 NOTE — DISCHARGE INSTRUCTIONS
Wear monitor.  Follow up with Dr Lopez   Drink plenty of fluids  Sit and stand slowly   Avoid any caffeine or over the counter stimulant and/or supplement unless you have had your doctor approve prior to taking   Return if worse or new concerns   Continue care with your primary care physician and have your blood pressure regularly checked and managed. Normal blood pressure is 120/80.

## 2018-12-13 NOTE — ED NOTES
Patient to er today with c/o palpitations today and heart rate up to 130. Patient reported she is 18 weeks pregnant.      Tunde Blake RN  12/12/18 8809

## 2018-12-14 ENCOUNTER — TELEPHONE (OUTPATIENT)
Dept: OBSTETRICS AND GYNECOLOGY | Age: 23
End: 2018-12-14

## 2018-12-14 NOTE — TELEPHONE ENCOUNTER
----- Message from Angeline Patel MD sent at 12/14/2018  8:06 AM EST -----  Please call Jana, her ab titer is 2

## 2018-12-17 ENCOUNTER — PROCEDURE VISIT (OUTPATIENT)
Dept: OBSTETRICS AND GYNECOLOGY | Age: 23
End: 2018-12-17

## 2018-12-17 ENCOUNTER — ROUTINE PRENATAL (OUTPATIENT)
Dept: OBSTETRICS AND GYNECOLOGY | Age: 23
End: 2018-12-17

## 2018-12-17 VITALS — DIASTOLIC BLOOD PRESSURE: 72 MMHG | WEIGHT: 200 LBS | SYSTOLIC BLOOD PRESSURE: 130 MMHG | BODY MASS INDEX: 35.43 KG/M2

## 2018-12-17 DIAGNOSIS — O34.219 PREVIOUS CESAREAN DELIVERY AFFECTING PREGNANCY: ICD-10-CM

## 2018-12-17 DIAGNOSIS — O36.1990 MATERNAL ATYPICAL ANTIBODY AFFECTING PREGNANCY, ANTEPARTUM, SINGLE OR UNSPECIFIED FETUS: ICD-10-CM

## 2018-12-17 DIAGNOSIS — O14.90 PRE-ECLAMPSIA, ANTEPARTUM: ICD-10-CM

## 2018-12-17 DIAGNOSIS — Z3A.19 19 WEEKS GESTATION OF PREGNANCY: Primary | ICD-10-CM

## 2018-12-17 DIAGNOSIS — Z36.86 ENCOUNTER FOR ANTENATAL SCREENING FOR CERVICAL LENGTH: Primary | ICD-10-CM

## 2018-12-17 PROCEDURE — 0502F SUBSEQUENT PRENATAL CARE: CPT | Performed by: OBSTETRICS & GYNECOLOGY

## 2018-12-17 PROCEDURE — 76817 TRANSVAGINAL US OBSTETRIC: CPT | Performed by: OBSTETRICS & GYNECOLOGY

## 2018-12-17 NOTE — PROGRESS NOTES
Pt in for routine f/u. She went to ER last week for palpitations. Her potassium was slightly decreased so they corrected that and gave her fluids. She has appt with cardiology tomorrow. Notes ongoing symptoms. She had sinus tachycardia  O: pt in no distress currently   Heart: mildly tachy with regular rhythm  A/p: Tachycardia: pt will see cardiology tomorrow, holter done and results pending    + ab screen ( little c ) titer stable last week. MFM following; pt had normal u/s at 17 weeks, she is scheduled for f/u anatomy u/s 1/7 with Dr. Terrazas; gave order for cervical length u/s today; not done with anatomy; orders already placed for pt to continue ab screen Q 2 weeks at [facility] lab; pt advised to call MD if she does not receive call regarding ab results within 2 days of draw; f/u 2 weeks    Hx preeclampsia: pt on baby asa    Random glucose was elevated in ER: plan early glucose at f/u 2 weeks

## 2018-12-18 ENCOUNTER — OFFICE VISIT (OUTPATIENT)
Dept: CARDIOLOGY | Facility: CLINIC | Age: 23
End: 2018-12-18

## 2018-12-18 ENCOUNTER — TELEPHONE (OUTPATIENT)
Dept: CARDIOLOGY | Facility: CLINIC | Age: 23
End: 2018-12-18

## 2018-12-18 VITALS
HEART RATE: 75 BPM | SYSTOLIC BLOOD PRESSURE: 110 MMHG | DIASTOLIC BLOOD PRESSURE: 52 MMHG | HEIGHT: 63 IN | WEIGHT: 203 LBS | BODY MASS INDEX: 35.97 KG/M2

## 2018-12-18 DIAGNOSIS — R00.2 RAPID PALPITATIONS: Primary | ICD-10-CM

## 2018-12-18 DIAGNOSIS — R06.02 SHORTNESS OF BREATH: ICD-10-CM

## 2018-12-18 DIAGNOSIS — J45.909 ASTHMA, UNSPECIFIED ASTHMA SEVERITY, UNSPECIFIED WHETHER COMPLICATED, UNSPECIFIED WHETHER PERSISTENT: ICD-10-CM

## 2018-12-18 DIAGNOSIS — Z3A.19 19 WEEKS GESTATION OF PREGNANCY: ICD-10-CM

## 2018-12-18 PROCEDURE — 93227 XTRNL ECG REC<48 HR R&I: CPT | Performed by: INTERNAL MEDICINE

## 2018-12-18 PROCEDURE — 99214 OFFICE O/P EST MOD 30 MIN: CPT | Performed by: NURSE PRACTITIONER

## 2018-12-18 PROCEDURE — 93000 ELECTROCARDIOGRAM COMPLETE: CPT | Performed by: NURSE PRACTITIONER

## 2018-12-18 NOTE — PROGRESS NOTES
"Date of Office Visit: 2018  Encounter Provider: SYLVIA Beckman  Place of Service: [facility] CARDIOLOGY  Patient Name: Jana Nix  :1995    Chief Complaint   Patient presents with   • Palpitations   • Shortness of Breath   • Follow-up   :     HPI: Jana Nix is a 23 y.o. female is a patient of Dr. Lopez. I am seeing her today for the first time and have reviewed her record.     Her past medical history is significant of asthma and palpitations.    She has a long-standing history of intermittent palpitations since she was a teenager.  She was told she had PVCs.  She wore a Holter monitor in  which revealed rare PACs and PVCs and ventricular triplet.  She do not have symptoms while wearing the monitor.  She was not considered a candidate for beta blockers due to history of asthma.  An echocardiogram was recommended she was unable to complete that.  She later reported the bronchodilators made her symptoms worse.  [place] [address] it was recommended that she wear a 48 hour monitor and have another transthoracic echocardiogram however that was not completed.    He presented to the emergency department on 2018 with complaint of palpitations.  She was mildly dehydrated and had a potassium of 3.3.  She received 40 meq of potassium.  Her heart rate was 128 and EKG confirmed sinus tachycardia with rate 105. A 48 hour Holter monitor was ordered and she was advised to follow-up with cardiology.    Patient presents today for hospital follow-up.  Her palpitations are better this week.  She does feel her heart race when she gets up in a take the moment for her to catch her breath.  She has some chronic shortness of breath related to asthma which is not necessarily worse.  She denies chest pain tightness pressure, edema, dizziness, lightheadedness, near syncope, or syncope.  She is 19 weeks pregnant and does \"a little cardio and some stretching exercises but not too much. " " She has been unable to complete echocardiograms in the past due to financial burden.      No Known Allergies    Past Medical History:   Diagnosis Date   • Allergic rhinitis    • Anxiety    • Asthma    • Chlamydia    • Community acquired pneumonia     2016   • Depression    • Mild dehydration    • Palpitations    • PVC (premature ventricular contraction)    • Streptococcal pharyngitis     strep A       Past Surgical History:   Procedure Laterality Date   •  SECTION      x2         Family and social history reviewed.     Review of Systems   Cardiovascular: Positive for palpitations.   Respiratory: Positive for shortness of breath.      All other systems were reviewed and are negative          Objective:     Vitals:    18 0809   BP: 110/52   BP Location: Left arm   Patient Position: Sitting   Pulse: 75   Weight: 92.1 kg (203 lb)   Height: 160 cm (63\")     Body mass index is 35.96 kg/m².    PHYSICAL EXAM:  Physical Exam   Constitutional: She is oriented to person, place, and time. She appears well-developed and well-nourished. No distress.   HENT:   Head: Normocephalic.   Eyes: Conjunctivae are normal.   Neck: Normal range of motion. No JVD present.   Cardiovascular: Normal rate, normal heart sounds and intact distal pulses. A regularly irregular rhythm present.   No murmur heard.  Pulses:       Carotid pulses are 2+ on the right side, and 2+ on the left side.       Radial pulses are 2+ on the right side, and 2+ on the left side.        Posterior tibial pulses are 2+ on the right side, and 2+ on the left side.   Pulmonary/Chest: Effort normal and breath sounds normal. No respiratory distress. She has no wheezes. She has no rhonchi. She has no rales. She exhibits no tenderness.   Abdominal: Soft. Bowel sounds are normal. She exhibits no distension.   Musculoskeletal: Normal range of motion. She exhibits no edema.   Neurological: She is alert and oriented to person, place, and time.   Skin: Skin is warm, " dry and intact. No rash noted. She is not diaphoretic. No cyanosis.   Psychiatric: She has a normal mood and affect. Her behavior is normal. Judgment and thought content normal.         ECG 12 Lead  Date/Time: 12/18/2018 8:20 AM  Performed by: Celi Gutierrez APRN  Authorized by: Celi Gutierrez APRN   Comparison: compared with previous ECG from 12/12/2018  Similar to previous ECG  Rhythm: sinus rhythm  Rate: normal  QRS axis: normal  Clinical impression: dysrhythmia - atrial            Current Outpatient Medications   Medication Sig Dispense Refill   • albuterol (PROAIR RESPICLICK) 108 (90 Base) MCG/ACT inhaler Inhale 2 puffs Every 4 (Four)- 6(Six) Hours As Needed. 1 inhaler 2   • aspirin 81 MG chewable tablet Chew 81 mg Daily.     • Fluticasone Furoate-Vilanterol (BREO ELLIPTA) 100-25 MCG/INH aerosol powder  Inhale.     • loratadine (CLARITIN) 10 MG tablet Take 10 mg by mouth.     • Prenat w/o A-[id number]-DHA (CITRANATAL ASSURE) 35-1 & 300 MG tablet Take 1 tablet and 1 soft gel by mouth Daily. 30 tablet 12     No current facility-administered medications for this visit.      Assessment:       Diagnosis Plan   1. Rapid palpitations  ECG 12 Lead    Adult Transthoracic Echo Complete W/ Cont if Necessary Per Protocol   2. Shortness of breath  ECG 12 Lead    Adult Transthoracic Echo Complete W/ Cont if Necessary Per Protocol   3. Asthma, unspecified asthma severity, unspecified whether complicated, unspecified whether persistent     4. 19 weeks gestation of pregnancy          Orders Placed This Encounter   Procedures   • ECG 12 Lead     This order was created via procedure documentation   • Adult Transthoracic Echo Complete W/ Cont if Necessary Per Protocol     Patient is pregnant     Standing Status:   Future     Standing Expiration Date:   12/18/2019     Order Specific Question:   Reason for exam?     Answer:   Palpitations         Plan:         1. Palpitations/ History of PACs/ PVCs- 48 hour holter pending. She is  not a candidate for beta blockers due to asthma. Will obtain her monitor to have it read/finalized. She will return for echocardiogram and contact the financial advisors to determine financial burden. Alternative option is to have this performed at [facility] and she would need to fill out paperwork for that.  2. Pregnancy 19 weeks  3. Asthma- reportedly severe- not candidate for beta blockers      Plan of care reviewed with Dr. Lopez  Follow up to be made once results are reviewed.        It has been a pleasure to participate in this patient's care.      Thank you,  SYLVIA Beckman      **Caleb Disclaimer:**  Much of this encounter note is an electronic transcription/translation of spoken language to printed text. The electronic translation of spoken language may permit erroneous, or at times, nonsensical words or phrases to be inadvertently transcribed. Although I have reviewed the note for such errors, some may still exist.

## 2018-12-18 NOTE — TELEPHONE ENCOUNTER
Patient called for the results of holter done 12/12/18.  Results are in Epic.  Patient can be reached at [phone]./ ANGEL

## 2019-01-03 ENCOUNTER — ROUTINE PRENATAL (OUTPATIENT)
Dept: OBSTETRICS AND GYNECOLOGY | Age: 24
End: 2019-01-03

## 2019-01-03 ENCOUNTER — LAB (OUTPATIENT)
Dept: LAB | Facility: HOSPITAL | Age: 24
End: 2019-01-03

## 2019-01-03 VITALS — WEIGHT: 201.6 LBS | BODY MASS INDEX: 35.71 KG/M2 | DIASTOLIC BLOOD PRESSURE: 80 MMHG | SYSTOLIC BLOOD PRESSURE: 122 MMHG

## 2019-01-03 DIAGNOSIS — Z13.1 SCREENING FOR DIABETES MELLITUS: ICD-10-CM

## 2019-01-03 DIAGNOSIS — O34.219 PREVIOUS CESAREAN DELIVERY AFFECTING PREGNANCY: ICD-10-CM

## 2019-01-03 DIAGNOSIS — O14.90 PRE-ECLAMPSIA, ANTEPARTUM: ICD-10-CM

## 2019-01-03 DIAGNOSIS — O36.1990 MATERNAL ATYPICAL ANTIBODY AFFECTING PREGNANCY, ANTEPARTUM, SINGLE OR UNSPECIFIED FETUS: ICD-10-CM

## 2019-01-03 DIAGNOSIS — Z3A.21 21 WEEKS GESTATION OF PREGNANCY: Primary | ICD-10-CM

## 2019-01-03 DIAGNOSIS — Z13.0 SCREENING FOR IRON DEFICIENCY ANEMIA: ICD-10-CM

## 2019-01-03 LAB
A AB TITR SERPL: NORMAL {TITER}
BASOPHILS # BLD AUTO: 0.01 10*3/MM3 (ref 0–0.2)
BASOPHILS NFR BLD AUTO: 0.1 % (ref 0–1.5)
EOSINOPHIL # BLD AUTO: 0.81 10*3/MM3 (ref 0–0.7)
EOSINOPHIL NFR BLD AUTO: 7.6 % (ref 0.3–6.2)
ERYTHROCYTE [DISTWIDTH] IN BLOOD BY AUTOMATED COUNT: 14.8 % (ref 11.7–13)
GLUCOSE 1H P 50 G GLC PO SERPL-MCNC: 141 MG/DL (ref 65–139)
HCT VFR BLD AUTO: 36.8 % (ref 35.6–45.5)
HGB BLD-MCNC: 12 G/DL (ref 11.9–15.5)
IMM GRANULOCYTES # BLD: 0.07 10*3/MM3 (ref 0–0.03)
IMM GRANULOCYTES NFR BLD: 0.7 % (ref 0–0.5)
LYMPHOCYTES # BLD AUTO: 1.93 10*3/MM3 (ref 0.9–4.8)
LYMPHOCYTES NFR BLD AUTO: 18 % (ref 19.6–45.3)
MCH RBC QN AUTO: 28.8 PG (ref 26.9–32)
MCHC RBC AUTO-ENTMCNC: 32.6 G/DL (ref 32.4–36.3)
MCV RBC AUTO: 88.2 FL (ref 80.5–98.2)
MONOCYTES # BLD AUTO: 0.55 10*3/MM3 (ref 0.2–1.2)
MONOCYTES NFR BLD AUTO: 5.1 % (ref 5–12)
NEUTROPHILS # BLD AUTO: 7.33 10*3/MM3 (ref 1.9–8.1)
NEUTROPHILS NFR BLD AUTO: 68.5 % (ref 42.7–76)
PLATELET # BLD AUTO: 194 10*3/MM3 (ref 140–500)
RBC # BLD AUTO: 4.17 10*6/MM3 (ref 3.9–5.2)
WBC # BLD AUTO: 10.7 10*3/MM3 (ref 4.5–10.7)

## 2019-01-03 PROCEDURE — 0502F SUBSEQUENT PRENATAL CARE: CPT | Performed by: OBSTETRICS & GYNECOLOGY

## 2019-01-03 PROCEDURE — 36415 COLL VENOUS BLD VENIPUNCTURE: CPT

## 2019-01-03 PROCEDURE — 86870 RBC ANTIBODY IDENTIFICATION: CPT

## 2019-01-03 PROCEDURE — 86886 COOMBS TEST INDIRECT TITER: CPT | Performed by: OBSTETRICS & GYNECOLOGY

## 2019-01-03 NOTE — PROGRESS NOTES
Pt presents for f/u visit. She is doing early one hour today. She is having some daily nausea but no other issues, denies bleeding, regular ctx or leaking fluid    A/p: 21 weeks with increased BMI: early one hour today    + ab titer with little c: pt reports she missed her draw last week but will go today, continue Q 2 weeks, prior ab level stable at 2, MFM, Dr. Terrazas is following and pt has appt with her and targeted u/s next week    Hx preeclampsia: pt on baby asa, BP stable today    Previous  section: pt declines tubal, she is aware that multiple cesareans increase risk of complications, she plans repeat, will schedule later as she is at risk of delivery at < 39 weeks  Tachycardia: cardiology following, had holter and today stable, she reports symptoms are stable, she has echo scheduled    rtc 3 weeks

## 2019-01-04 ENCOUNTER — TELEPHONE (OUTPATIENT)
Dept: OBSTETRICS AND GYNECOLOGY | Age: 24
End: 2019-01-04

## 2019-01-04 DIAGNOSIS — R73.09 ELEVATED GLUCOSE TOLERANCE TEST: Primary | ICD-10-CM

## 2019-01-04 NOTE — TELEPHONE ENCOUNTER
Called pt and reviewed labs. One hour is elevated and she needs 3 hour and orders placed. Recommend pt come in next week, she understands. Instructed on 3 hour. Reviewed ab titer now at 4. Pt will review with MFM at appt next week and repeat in 2 weeks.

## 2019-01-04 NOTE — TELEPHONE ENCOUNTER
Dr Patel Pt  States per Dr Patel she will come in for her 3 hour Glucose test yet can't come next week due to already having 2 other appt . Pt will come in 1/16/18 at 8 am.

## 2019-01-08 ENCOUNTER — HOSPITAL ENCOUNTER (OUTPATIENT)
Dept: CARDIOLOGY | Facility: HOSPITAL | Age: 24
Discharge: HOME | End: 2019-01-08
Admitting: NURSE PRACTITIONER

## 2019-01-08 VITALS
BODY MASS INDEX: 35.61 KG/M2 | HEIGHT: 63 IN | DIASTOLIC BLOOD PRESSURE: 70 MMHG | WEIGHT: 201 LBS | SYSTOLIC BLOOD PRESSURE: 120 MMHG | HEART RATE: 74 BPM

## 2019-01-08 DIAGNOSIS — R06.02 SHORTNESS OF BREATH: ICD-10-CM

## 2019-01-08 DIAGNOSIS — R00.2 RAPID PALPITATIONS: ICD-10-CM

## 2019-01-08 LAB
ASCENDING AORTA: 2.2 CM
BH CV ECHO MEAS - ACS: 2 CM
BH CV ECHO MEAS - AO MAX PG (FULL): 5.2 MMHG
BH CV ECHO MEAS - AO MAX PG: 8.6 MMHG
BH CV ECHO MEAS - AO MEAN PG (FULL): 2.7 MMHG
BH CV ECHO MEAS - AO MEAN PG: 4.9 MMHG
BH CV ECHO MEAS - AO ROOT AREA (BSA CORRECTED): 1.3
BH CV ECHO MEAS - AO ROOT AREA: 5.1 CM^2
BH CV ECHO MEAS - AO ROOT DIAM: 2.5 CM
BH CV ECHO MEAS - AO V2 MAX: 146.6 CM/SEC
BH CV ECHO MEAS - AO V2 MEAN: 105.9 CM/SEC
BH CV ECHO MEAS - AO V2 VTI: 28.3 CM
BH CV ECHO MEAS - ASC AORTA: 2.2 CM
BH CV ECHO MEAS - AVA(I,A): 1.7 CM^2
BH CV ECHO MEAS - AVA(I,D): 1.7 CM^2
BH CV ECHO MEAS - AVA(V,A): 1.7 CM^2
BH CV ECHO MEAS - AVA(V,D): 1.7 CM^2
BH CV ECHO MEAS - BSA(HAYCOCK): 2.1 M^2
BH CV ECHO MEAS - BSA: 1.9 M^2
BH CV ECHO MEAS - BZI_BMI: 35.6 KILOGRAMS/M^2
BH CV ECHO MEAS - BZI_METRIC_HEIGHT: 160 CM
BH CV ECHO MEAS - BZI_METRIC_WEIGHT: 91.2 KG
BH CV ECHO MEAS - EDV(MOD-SP2): 95 ML
BH CV ECHO MEAS - EDV(MOD-SP4): 109 ML
BH CV ECHO MEAS - EDV(TEICH): 107.3 ML
BH CV ECHO MEAS - EF(CUBED): 64.4 %
BH CV ECHO MEAS - EF(MOD-BP): 60 %
BH CV ECHO MEAS - EF(MOD-SP2): 56.8 %
BH CV ECHO MEAS - EF(MOD-SP4): 62.4 %
BH CV ECHO MEAS - EF(TEICH): 55.8 %
BH CV ECHO MEAS - ESV(MOD-SP2): 41 ML
BH CV ECHO MEAS - ESV(MOD-SP4): 41 ML
BH CV ECHO MEAS - ESV(TEICH): 47.5 ML
BH CV ECHO MEAS - IVS/LVPW: 0.92
BH CV ECHO MEAS - IVSD: 0.86 CM
BH CV ECHO MEAS - LAT PEAK E' VEL: 17 CM/SEC
BH CV ECHO MEAS - LV DIASTOLIC VOL/BSA (35-75): 56.2 ML/M^2
BH CV ECHO MEAS - LV MASS(C)D: 146.5 GRAMS
BH CV ECHO MEAS - LV MASS(C)DI: 75.6 GRAMS/M^2
BH CV ECHO MEAS - LV MAX PG: 3.4 MMHG
BH CV ECHO MEAS - LV MEAN PG: 2.2 MMHG
BH CV ECHO MEAS - LV SYSTOLIC VOL/BSA (12-30): 21.2 ML/M^2
BH CV ECHO MEAS - LV V1 MAX: 92 CM/SEC
BH CV ECHO MEAS - LV V1 MEAN: 68.8 CM/SEC
BH CV ECHO MEAS - LV V1 VTI: 18.4 CM
BH CV ECHO MEAS - LVIDD: 4.8 CM
BH CV ECHO MEAS - LVIDS: 3.4 CM
BH CV ECHO MEAS - LVLD AP2: 8.3 CM
BH CV ECHO MEAS - LVLD AP4: 8.1 CM
BH CV ECHO MEAS - LVLS AP2: 7.5 CM
BH CV ECHO MEAS - LVLS AP4: 7 CM
BH CV ECHO MEAS - LVOT AREA (M): 2.5 CM^2
BH CV ECHO MEAS - LVOT AREA: 2.7 CM^2
BH CV ECHO MEAS - LVOT DIAM: 1.8 CM
BH CV ECHO MEAS - LVPWD: 0.93 CM
BH CV ECHO MEAS - MED PEAK E' VEL: 10 CM/SEC
BH CV ECHO MEAS - MV A DUR: 0.08 SEC
BH CV ECHO MEAS - MV A MAX VEL: 52.1 CM/SEC
BH CV ECHO MEAS - MV DEC SLOPE: 347.6 CM/SEC^2
BH CV ECHO MEAS - MV DEC TIME: 0.19 SEC
BH CV ECHO MEAS - MV E MAX VEL: 81.2 CM/SEC
BH CV ECHO MEAS - MV E/A: 1.6
BH CV ECHO MEAS - MV MAX PG: 2.7 MMHG
BH CV ECHO MEAS - MV MEAN PG: 1.3 MMHG
BH CV ECHO MEAS - MV P1/2T MAX VEL: 79.9 CM/SEC
BH CV ECHO MEAS - MV P1/2T: 67.3 MSEC
BH CV ECHO MEAS - MV V2 MAX: 81.4 CM/SEC
BH CV ECHO MEAS - MV V2 MEAN: 53.4 CM/SEC
BH CV ECHO MEAS - MV V2 VTI: 18.6 CM
BH CV ECHO MEAS - MVA P1/2T LCG: 2.8 CM^2
BH CV ECHO MEAS - MVA(P1/2T): 3.3 CM^2
BH CV ECHO MEAS - MVA(VTI): 2.6 CM^2
BH CV ECHO MEAS - PA ACC TIME: 0.1 SEC
BH CV ECHO MEAS - PA MAX PG (FULL): 3 MMHG
BH CV ECHO MEAS - PA MAX PG: 6.5 MMHG
BH CV ECHO MEAS - PA PR(ACCEL): 33.1 MMHG
BH CV ECHO MEAS - PA V2 MAX: 127.6 CM/SEC
BH CV ECHO MEAS - PULM A REVS DUR: 0.11 SEC
BH CV ECHO MEAS - PULM A REVS VEL: 33.7 CM/SEC
BH CV ECHO MEAS - PULM DIAS VEL: 58.9 CM/SEC
BH CV ECHO MEAS - PULM S/D: 0.95
BH CV ECHO MEAS - PULM SYS VEL: 56.2 CM/SEC
BH CV ECHO MEAS - PVA(V,A): 1.7 CM^2
BH CV ECHO MEAS - PVA(V,D): 1.7 CM^2
BH CV ECHO MEAS - QP/QS: 0.98
BH CV ECHO MEAS - RAP SYSTOLE: 3 MMHG
BH CV ECHO MEAS - RV MAX PG: 3.5 MMHG
BH CV ECHO MEAS - RV MEAN PG: 2.4 MMHG
BH CV ECHO MEAS - RV V1 MAX: 93.4 CM/SEC
BH CV ECHO MEAS - RV V1 MEAN: 73.1 CM/SEC
BH CV ECHO MEAS - RV V1 VTI: 20.3 CM
BH CV ECHO MEAS - RVOT AREA: 2.4 CM^2
BH CV ECHO MEAS - RVOT DIAM: 1.7 CM
BH CV ECHO MEAS - RVSP: 16 MMHG
BH CV ECHO MEAS - SI(AO): 74.2 ML/M^2
BH CV ECHO MEAS - SI(CUBED): 36.7 ML/M^2
BH CV ECHO MEAS - SI(LVOT): 25.3 ML/M^2
BH CV ECHO MEAS - SI(MOD-SP2): 27.9 ML/M^2
BH CV ECHO MEAS - SI(MOD-SP4): 35.1 ML/M^2
BH CV ECHO MEAS - SI(TEICH): 30.9 ML/M^2
BH CV ECHO MEAS - SUP REN AO DIAM: 2 CM
BH CV ECHO MEAS - SV(AO): 143.8 ML
BH CV ECHO MEAS - SV(CUBED): 71 ML
BH CV ECHO MEAS - SV(LVOT): 49 ML
BH CV ECHO MEAS - SV(MOD-SP2): 54 ML
BH CV ECHO MEAS - SV(MOD-SP4): 68 ML
BH CV ECHO MEAS - SV(RVOT): 48.2 ML
BH CV ECHO MEAS - SV(TEICH): 59.9 ML
BH CV ECHO MEAS - TAPSE (>1.6): 3 CM2
BH CV ECHO MEAS - TR MAX VEL: 177 CM/SEC
BH CV ECHO MEASUREMENTS AVERAGE E/E' RATIO: 6.01
BH CV XLRA - RV BASE: 3 CM
BH CV XLRA - TDI S': 13 CM/SEC
LEFT ATRIUM VOLUME INDEX: 21 ML/M2
SINUS: 2.2 CM
STJ: 2.5 CM

## 2019-01-08 PROCEDURE — 93306 TTE W/DOPPLER COMPLETE: CPT

## 2019-01-08 PROCEDURE — 93306 TTE W/DOPPLER COMPLETE: CPT | Performed by: INTERNAL MEDICINE

## 2019-01-09 ENCOUNTER — TELEPHONE (OUTPATIENT)
Dept: CARDIOLOGY | Facility: CLINIC | Age: 24
End: 2019-01-09

## 2019-01-09 RX ORDER — VERAPAMIL HYDROCHLORIDE 40 MG/1
40 TABLET ORAL 2 TIMES DAILY
Qty: 60 TABLET | Refills: 11 | Status: SHIPPED | OUTPATIENT
Start: 2019-01-09 | End: 2020-12-24

## 2019-01-09 NOTE — TELEPHONE ENCOUNTER
After discussing with Dr. Terrazas, we agreed that verapamil would be a better rate control option compared to digoxin as verapamil is less likely to affect the fetal heart rate.  Discussed with Dr. Lopez who agrees with low dose short acting verapamil 40 mg BID.    Spoke with patient that I have discussed with Dr. Terrazas and Dr. Lopez to treat sinus tachycardia with verapamil 40 mg BID. I have sent this to her pharmacy. She will call with any further issues.    Dr. Terrazas and Dr. Lopez, this is just FYI. Thank you both for your help.      AL

## 2019-01-09 NOTE — TELEPHONE ENCOUNTER
Spoke with patient and reported normal echo.  She reported that her maternal fetal medicine physician,  Dr. Elle Mcfarlane would like to discuss her sinus tachycardia with our office.  Dr. Mcfarlane can be reached at [phone].  Patient has concern that heart rate is greater than 130 at time, although the episodes are transient and short-lived.  She is not a candidate for beta blockade due to history of asthma.  Our office intially recommended to monitor clinically and encouraged hydration.  Sinus tachycardia is actually normal in the age range of 20 as well as normal in pregnancy.  She does have shortness of breath with exertion with the tachycardia.  I spoke with Dr. Mcfarlane's office and I am waiting on a returned phone call to discuss the above.      AL

## 2019-01-13 ENCOUNTER — TELEPHONE (OUTPATIENT)
Dept: OBSTETRICS AND GYNECOLOGY | Age: 24
End: 2019-01-13

## 2019-01-13 ENCOUNTER — HOSPITAL ENCOUNTER (OUTPATIENT)
Facility: HOSPITAL | Age: 24
Setting detail: OBSERVATION
Discharge: HOME | End: 2019-01-13
Attending: OBSTETRICS & GYNECOLOGY | Admitting: OBSTETRICS & GYNECOLOGY

## 2019-01-13 VITALS
RESPIRATION RATE: 18 BRPM | HEIGHT: 63 IN | TEMPERATURE: 98.4 F | HEART RATE: 98 BPM | DIASTOLIC BLOOD PRESSURE: 81 MMHG | BODY MASS INDEX: 35.61 KG/M2 | SYSTOLIC BLOOD PRESSURE: 127 MMHG

## 2019-01-13 PROBLEM — Z34.90 PREGNANCY: Status: ACTIVE | Noted: 2019-01-13

## 2019-01-13 LAB
BILIRUB UR QL STRIP: NEGATIVE
CLARITY UR: CLEAR
COLOR UR: YELLOW
GLUCOSE UR STRIP-MCNC: NEGATIVE MG/DL
HGB UR QL STRIP.AUTO: NEGATIVE
KETONES UR QL STRIP: NEGATIVE
LEUKOCYTE ESTERASE UR QL STRIP.AUTO: NEGATIVE
NITRITE UR QL STRIP: NEGATIVE
PH UR STRIP.AUTO: 6.5 [PH] (ref 5–8)
PROT UR QL STRIP: NEGATIVE
SP GR UR STRIP: 1.01 (ref 1–1.03)
UROBILINOGEN UR QL STRIP: NORMAL

## 2019-01-13 PROCEDURE — G0378 HOSPITAL OBSERVATION PER HR: HCPCS

## 2019-01-13 PROCEDURE — 81003 URINALYSIS AUTO W/O SCOPE: CPT | Performed by: OBSTETRICS & GYNECOLOGY

## 2019-01-13 RX ORDER — ACETAMINOPHEN 500 MG
1000 TABLET ORAL ONCE
Status: COMPLETED | OUTPATIENT
Start: 2019-01-13 | End: 2019-01-13

## 2019-01-13 RX ADMIN — ACETAMINOPHEN 1000 MG: 500 TABLET, FILM COATED ORAL at 05:34

## 2019-01-13 NOTE — TELEPHONE ENCOUNTER
Patient called with complaint of back pain coming and going, about every 5-10 minute for more than an hour. No bleeding, LOF. Instructed to present to labor and delivery.    Sigrid Londono MD  1/13/2019  3:45 AM

## 2019-01-15 ENCOUNTER — TELEPHONE (OUTPATIENT)
Dept: OBSTETRICS AND GYNECOLOGY | Age: 24
End: 2019-01-15

## 2019-01-15 NOTE — TELEPHONE ENCOUNTER
Dr Patel pt 23 wks pregnant reports that for the last 2 weeks she's had horrible pain of the left of her spine in her mid to upper back, burning and shooting stabbing pain, could hardly sit or move her left arm that sends a pain down her entire back. The pain is much worse today. Wanted to be seen tomorrow as she is also here in office for her 3 hour glucose tests. May we add her to the schedule for tomorrow as pt is requesting? Also is an u/s needed as well?

## 2019-01-16 ENCOUNTER — PROCEDURE VISIT (OUTPATIENT)
Dept: OBSTETRICS AND GYNECOLOGY | Age: 24
End: 2019-01-16

## 2019-01-16 ENCOUNTER — ROUTINE PRENATAL (OUTPATIENT)
Dept: OBSTETRICS AND GYNECOLOGY | Age: 24
End: 2019-01-16

## 2019-01-16 VITALS — WEIGHT: 206.8 LBS | SYSTOLIC BLOOD PRESSURE: 110 MMHG | DIASTOLIC BLOOD PRESSURE: 60 MMHG | BODY MASS INDEX: 36.63 KG/M2

## 2019-01-16 DIAGNOSIS — O99.891 BACK PAIN IN PREGNANCY: ICD-10-CM

## 2019-01-16 DIAGNOSIS — Z3A.23 23 WEEKS GESTATION OF PREGNANCY: Primary | ICD-10-CM

## 2019-01-16 DIAGNOSIS — M54.9 BACK PAIN AFFECTING PREGNANCY IN SECOND TRIMESTER: Primary | ICD-10-CM

## 2019-01-16 DIAGNOSIS — M54.9 BACK PAIN IN PREGNANCY: ICD-10-CM

## 2019-01-16 DIAGNOSIS — O99.891 BACK PAIN AFFECTING PREGNANCY IN SECOND TRIMESTER: Primary | ICD-10-CM

## 2019-01-16 PROCEDURE — 76817 TRANSVAGINAL US OBSTETRIC: CPT | Performed by: OBSTETRICS & GYNECOLOGY

## 2019-01-16 PROCEDURE — 0502F SUBSEQUENT PRENATAL CARE: CPT | Performed by: OBSTETRICS & GYNECOLOGY

## 2019-01-16 NOTE — PROGRESS NOTES
Pt in today for 3 hour and requested appt regarding back pain. She reports she is having pain in her mid left back intermittently. She was having more discomfort yesterday at work but improved today. She had MD there evaluate and he suggested her back felt swollen. She denies n/v or fever/chills. She called MD over weekend regarding discomfort and went to L&D. She notes they did not see evidence of PTL and UA was done and normal. She denies vaginal bleeding or leaking fluid. She notes active fetal movement. She notes pain is not daily. It was worse Sunday when she went to L&D and when she was working on her feet yesterday.   O: pt in no distress, ambulating without issue  Abd: no tenderness, fh=24 cm, FHT's 138  Back: no CVA tenderness, no evidence of trauma or focal swelling  Ext: no edema or cords, no tenderness    A/p: 23 weeks with intermittent left back pain, no CVA tenderness, UA done when pt went to L&D was negative; suspect musculoskeletal source, gave handout on low back pain in pregnancy, offered consult with PT and pt declines currently; will check cervical length to assess further.    Addendum: cervical length normal at 5 cm so PTL less likely source of pain, will order renal u/s and pt counseled. Advised she call if symptoms worsen    She is doing 3 hour today, will call with results  + ab titer/anti c: last titer increased to 4, pt being followed by MFM as well, pt previously instructed to have Q 2 week ab titers and she notes she plans to go for f/u this week

## 2019-01-17 ENCOUNTER — LAB (OUTPATIENT)
Dept: LAB | Facility: HOSPITAL | Age: 24
End: 2019-01-17

## 2019-01-17 DIAGNOSIS — O36.1990 MATERNAL ATYPICAL ANTIBODY AFFECTING PREGNANCY, ANTEPARTUM, SINGLE OR UNSPECIFIED FETUS: ICD-10-CM

## 2019-01-17 LAB
GLUCOSE 1H P 100 G GLC PO SERPL-MCNC: 135 MG/DL
GLUCOSE 2H P 100 G GLC PO SERPL-MCNC: 131 MG/DL
GLUCOSE 3H P 100 G GLC PO SERPL-MCNC: 99 MG/DL
GLUCOSE P FAST SERPL-MCNC: 67 MG/DL (ref 72–112)

## 2019-01-17 PROCEDURE — 86886 COOMBS TEST INDIRECT TITER: CPT | Performed by: OBSTETRICS & GYNECOLOGY

## 2019-01-17 PROCEDURE — 36415 COLL VENOUS BLD VENIPUNCTURE: CPT

## 2019-01-18 ENCOUNTER — TELEPHONE (OUTPATIENT)
Dept: OBSTETRICS AND GYNECOLOGY | Age: 24
End: 2019-01-18

## 2019-01-18 LAB
A AB TITR SERPL: NORMAL {TITER}
BACTERIA UR CULT: NO GROWTH
BACTERIA UR CULT: NORMAL

## 2019-01-18 NOTE — TELEPHONE ENCOUNTER
Called [facility], Dr. Terrazas, to schedule patient next week ASAP.  Spoke w/  Candy.  She informed me that patient already called their office this morning to ask about moving her next appointment sooner because of her increased titer (8).  A message was sent to Dr. Terrazas, and she had responded to the message just 6 minutes prior to my phone call.  Per Dr. Terrazas, patient's next appointment on 2/4/19 is an appropriate date for her follow up and does not need to be seen sooner.  I still requested to move up appt sooner, but  would not move it because Dr. Terrazas had made the decision.  If Dr. Patel would like to call to discuss with Dr. Terrazas she may reconsider.    Called Dr. Patel to discuss - she advised for patient to have repeat ab titer and then f/u appt with Dr. Patel so that we can closely monitor titer.    Spoke with patient.  Filled her in about all of the above communications with Dr. Terrazas and Dr. Patel.  Patient agrees to have repeat titer next week (at the hospital).  She is already scheduled with Dr. Patel on Friday 1/25.    Will send message to Dr. Patel to confirm if 1/25 is ok or if she wants the patient seen here sooner.  Also will need lab order for repeat titer.

## 2019-01-18 NOTE — TELEPHONE ENCOUNTER
----- Message from Angeline Patel MD sent at 1/17/2019  9:14 AM EST -----  Please call Jana, she passed her 3 hour test

## 2019-01-18 NOTE — TELEPHONE ENCOUNTER
Called pt and advised of rising ab titer. Advised she needs to see [facility] asap. Pt understands, she is already being followed by Dr. Terrazas. Contacted office staff and advised pt needs to see [facility] asap as titer rising quickly, now up to 8. They will call [facility] today to book.

## 2019-01-21 ENCOUNTER — TELEPHONE (OUTPATIENT)
Dept: OBSTETRICS AND GYNECOLOGY | Age: 24
End: 2019-01-21

## 2019-01-21 DIAGNOSIS — O36.1990 MATERNAL ATYPICAL ANTIBODY AFFECTING PREGNANCY, ANTEPARTUM, SINGLE OR UNSPECIFIED FETUS: Primary | ICD-10-CM

## 2019-01-22 ENCOUNTER — TELEPHONE (OUTPATIENT)
Dept: OBSTETRICS AND GYNECOLOGY | Age: 24
End: 2019-01-22

## 2019-01-22 NOTE — TELEPHONE ENCOUNTER
Called pt to let her know that orders for weekly ab titer placed. No answer. Left message of call.

## 2019-01-22 NOTE — TELEPHONE ENCOUNTER
Called pt regarding renal u/s. She is declining u/s due to symptom improvement. She will f/u as scheduled this week.

## 2019-01-22 NOTE — TELEPHONE ENCOUNTER
"FYI - please advise how to proceed.  [facility] contacted patient to schedule bilateral renal ultrasound.  Per 's note on 1/22 1:02pm - \"PT REFUSED TEST BECAUSE SHE SAID SHE DIDN'T NEED IT.\"   "

## 2019-01-23 ENCOUNTER — LAB (OUTPATIENT)
Dept: LAB | Facility: HOSPITAL | Age: 24
End: 2019-01-23

## 2019-01-23 DIAGNOSIS — O36.1990 MATERNAL ATYPICAL ANTIBODY AFFECTING PREGNANCY, ANTEPARTUM, SINGLE OR UNSPECIFIED FETUS: ICD-10-CM

## 2019-01-23 LAB — A AB TITR SERPL: NORMAL {TITER}

## 2019-01-23 PROCEDURE — 86870 RBC ANTIBODY IDENTIFICATION: CPT

## 2019-01-23 PROCEDURE — 36415 COLL VENOUS BLD VENIPUNCTURE: CPT

## 2019-01-23 PROCEDURE — 86886 COOMBS TEST INDIRECT TITER: CPT | Performed by: OBSTETRICS & GYNECOLOGY

## 2019-01-25 ENCOUNTER — ROUTINE PRENATAL (OUTPATIENT)
Dept: OBSTETRICS AND GYNECOLOGY | Age: 24
End: 2019-01-25

## 2019-01-25 VITALS — WEIGHT: 208.2 LBS | SYSTOLIC BLOOD PRESSURE: 102 MMHG | BODY MASS INDEX: 36.88 KG/M2 | DIASTOLIC BLOOD PRESSURE: 60 MMHG

## 2019-01-25 DIAGNOSIS — O34.219 PREVIOUS CESAREAN DELIVERY AFFECTING PREGNANCY: ICD-10-CM

## 2019-01-25 DIAGNOSIS — R73.09 ELEVATED GLUCOSE: ICD-10-CM

## 2019-01-25 DIAGNOSIS — Z3A.24 24 WEEKS GESTATION OF PREGNANCY: Primary | ICD-10-CM

## 2019-01-25 DIAGNOSIS — O36.1990 MATERNAL ATYPICAL ANTIBODY AFFECTING PREGNANCY, ANTEPARTUM, SINGLE OR UNSPECIFIED FETUS: ICD-10-CM

## 2019-01-25 PROCEDURE — 0502F SUBSEQUENT PRENATAL CARE: CPT | Performed by: OBSTETRICS & GYNECOLOGY

## 2019-01-25 NOTE — PROGRESS NOTES
Pt is here for f/u. She was contacted to schedule renal u/s but declines now. She notes her back pain is greatly improved. She denies reg ctx, vag bleeding or leaking fluid  O: pt in no distress currently  Abd: nontender  Ext: no edema or cords  A/p: 24 weeks with + anti little c ab: titer increased to 8 last 2 checks. Pt and staff contacted [facility] and they advised pt keep her scheduled mandi 2/4; will repeat titer again next week and continue weekly at this time; [facility] advised pt she would see weekly at take over primary care if her titer goes to 16; plan f/u here 1 week to review repeat titer    Pt had early one hour and passed 3 hour, will repeat 3 hour at 28 weeks    Reviewed signs of PTL    Hx preeclampsia: pt on baby asa daily

## 2019-01-30 ENCOUNTER — TELEPHONE (OUTPATIENT)
Dept: OBSTETRICS AND GYNECOLOGY | Age: 24
End: 2019-01-30

## 2019-01-30 ENCOUNTER — LAB (OUTPATIENT)
Dept: LAB | Facility: HOSPITAL | Age: 24
End: 2019-01-30

## 2019-01-30 DIAGNOSIS — O36.1990 MATERNAL ATYPICAL ANTIBODY AFFECTING PREGNANCY, ANTEPARTUM, SINGLE OR UNSPECIFIED FETUS: ICD-10-CM

## 2019-01-30 LAB — A AB TITR SERPL: NORMAL {TITER}

## 2019-01-30 PROCEDURE — 36415 COLL VENOUS BLD VENIPUNCTURE: CPT

## 2019-01-30 PROCEDURE — 86870 RBC ANTIBODY IDENTIFICATION: CPT

## 2019-01-30 PROCEDURE — 86886 COOMBS TEST INDIRECT TITER: CPT | Performed by: OBSTETRICS & GYNECOLOGY

## 2019-01-30 NOTE — TELEPHONE ENCOUNTER
Dr Starr pt checking her chart seen titer is now up to 16, has appt this afternoon, do you want her to still come in.

## 2019-01-30 NOTE — TELEPHONE ENCOUNTER
Called pt to confirm her appt with [facility] tomorrow. She did weekly titer this am and it increased to 16. Pt contacted Dr. Terrazas [facility], and has appt there at 11:00 tomorrow. Pt cancelled appt here today as she will be seen by [facility] tomorrow. She currently denies any issues and notes active fetal movement.

## 2019-01-30 NOTE — TELEPHONE ENCOUNTER
Discussed with Dr Patel: Wants patient to keep today's appointment and Ana Rosa will call [facility] to see about getting patient before Monday. Pt notified.

## 2019-01-31 ENCOUNTER — TELEPHONE (OUTPATIENT)
Dept: OBSTETRICS AND GYNECOLOGY | Age: 24
End: 2019-01-31

## 2019-01-31 NOTE — TELEPHONE ENCOUNTER
Called pt and discussed appt today. She notes u/s with MFM today was normal. They will see her weekly.

## 2019-02-05 ENCOUNTER — TELEPHONE (OUTPATIENT)
Dept: OBSTETRICS AND GYNECOLOGY | Age: 24
End: 2019-02-05

## 2019-02-05 NOTE — TELEPHONE ENCOUNTER
Called pt, did not contact [facility] yet as was waiting on recent u/s to see if plan had changed. U/S was reassuring and per note, [facility] plans to continue weekly imaging only. Recommend pt come in tomorrow for f/u visit at 8:45 for BP monitoring and she agrees.

## 2019-02-06 ENCOUNTER — ROUTINE PRENATAL (OUTPATIENT)
Dept: OBSTETRICS AND GYNECOLOGY | Age: 24
End: 2019-02-06

## 2019-02-06 VITALS — SYSTOLIC BLOOD PRESSURE: 108 MMHG | BODY MASS INDEX: 36.67 KG/M2 | WEIGHT: 207 LBS | DIASTOLIC BLOOD PRESSURE: 68 MMHG

## 2019-02-06 DIAGNOSIS — O34.219 PREVIOUS CESAREAN DELIVERY AFFECTING PREGNANCY: ICD-10-CM

## 2019-02-06 DIAGNOSIS — Z3A.26 26 WEEKS GESTATION OF PREGNANCY: Primary | ICD-10-CM

## 2019-02-06 DIAGNOSIS — O36.1990 MATERNAL ATYPICAL ANTIBODY AFFECTING PREGNANCY, ANTEPARTUM, SINGLE OR UNSPECIFIED FETUS: ICD-10-CM

## 2019-02-06 DIAGNOSIS — R73.09 ELEVATED GLUCOSE: ICD-10-CM

## 2019-02-06 DIAGNOSIS — O14.90 PRE-ECLAMPSIA, ANTEPARTUM: ICD-10-CM

## 2019-02-06 PROCEDURE — 0502F SUBSEQUENT PRENATAL CARE: CPT | Performed by: OBSTETRICS & GYNECOLOGY

## 2019-02-06 NOTE — PROGRESS NOTES
Pt presents for f/u visit; she denies any issues today; she notes active fetal movement, she denies vag bleeding, leaking fluid or reg ctx  O : fh=27, FHT's 139  Ext: no cords or edema  A/p: 26 weeks with + little c ab: titer increased to 16 so MFM is following with weakly dopplers. Dr. Terrazas noted no need to check titers any longer and pt understands    Hx preeclampsia: BP good today, reviewed preeclamptic warnings, pt will continue daily baby asa; recommended pt start monitoring at home; she does monitor BP at work and notes values have been normal    Reviewed daily fmc's and PTL warnings, repeat 3 hour at f/u 2 weeks, tdap at f/u    Hx palpitations: pt followed by cardiologist, she is holding on medication at this time due to low heart rate at times, her symptoms are improved

## 2019-02-08 ENCOUNTER — TELEPHONE (OUTPATIENT)
Dept: OBSTETRICS AND GYNECOLOGY | Age: 24
End: 2019-02-08

## 2019-02-08 NOTE — TELEPHONE ENCOUNTER
Called pt to discuss conversation with Dr. Terrazas. Reviewed pt's care with MFM by phone today. At this point, she feels delivery at [facility] will likely be fine and pt can continue care with both me as primary OB and MFM. If she, M, needs to admit pt for urgent issues to [place], she will do so. Pt understands and agrees with plan. She prefers to deliver at [facility] if possible so will continue prenatal care with me and see MFM weekly for doppler

## 2019-02-13 ENCOUNTER — TELEPHONE (OUTPATIENT)
Dept: OBSTETRICS AND GYNECOLOGY | Age: 24
End: 2019-02-13

## 2019-02-13 NOTE — TELEPHONE ENCOUNTER
Dr Patel pt 27 wks pregnant (aware Dr Patel out & on call) states she has a hx of pre-eclampsia with a BP today of 138/78 and feels light headed and hot. Pt states her normal BP sustains at 106/60 and for the last few days has been 128/80. Pt is currently at work and can also be contacted at [phone] Option 1. Please advise.

## 2019-02-22 ENCOUNTER — ROUTINE PRENATAL (OUTPATIENT)
Dept: OBSTETRICS AND GYNECOLOGY | Age: 24
End: 2019-02-22

## 2019-02-22 VITALS — DIASTOLIC BLOOD PRESSURE: 60 MMHG | BODY MASS INDEX: 37.13 KG/M2 | SYSTOLIC BLOOD PRESSURE: 102 MMHG | WEIGHT: 209.6 LBS

## 2019-02-22 DIAGNOSIS — O36.1990 MATERNAL ATYPICAL ANTIBODY AFFECTING PREGNANCY, ANTEPARTUM, SINGLE OR UNSPECIFIED FETUS: ICD-10-CM

## 2019-02-22 DIAGNOSIS — Z23 NEED FOR DIPHTHERIA-TETANUS-PERTUSSIS (TDAP) VACCINE: ICD-10-CM

## 2019-02-22 DIAGNOSIS — Z3A.28 28 WEEKS GESTATION OF PREGNANCY: Primary | ICD-10-CM

## 2019-02-22 DIAGNOSIS — O34.219 PREVIOUS CESAREAN DELIVERY AFFECTING PREGNANCY: ICD-10-CM

## 2019-02-22 DIAGNOSIS — Z98.891 H/O CESAREAN SECTION: ICD-10-CM

## 2019-02-22 PROCEDURE — 90715 TDAP VACCINE 7 YRS/> IM: CPT | Performed by: OBSTETRICS & GYNECOLOGY

## 2019-02-22 PROCEDURE — 90471 IMMUNIZATION ADMIN: CPT | Performed by: OBSTETRICS & GYNECOLOGY

## 2019-02-22 PROCEDURE — 0502F SUBSEQUENT PRENATAL CARE: CPT | Performed by: OBSTETRICS & GYNECOLOGY

## 2019-02-22 NOTE — PROGRESS NOTES
Pt presents for f/u and repeat 3 hour today; she denies any issues; no ctx, bleeding or leaking fluid; she notes active fetal movement  A/p: anti c ab: titer increased to 16 so pt being monitored by MFM weekly, testing has been reassuring so far; advised daily fmc's    Repeat 3 hour today    tdap today and pt agrees    Hx preeclampsia: pt monitoring BP and on baby asa, will follow    Prior c/s: plan repeat c/s for delivery, timing pending MFM recommendations

## 2019-02-23 LAB
BASOPHILS # BLD AUTO: 0.02 10*3/MM3 (ref 0–0.2)
BASOPHILS NFR BLD AUTO: 0.2 % (ref 0–1.5)
EOSINOPHIL # BLD AUTO: 0.58 10*3/MM3 (ref 0–0.4)
EOSINOPHIL NFR BLD AUTO: 6.5 % (ref 0.3–6.2)
ERYTHROCYTE [DISTWIDTH] IN BLOOD BY AUTOMATED COUNT: 15.6 % (ref 12.3–15.4)
GLUCOSE 1H P 100 G GLC PO SERPL-MCNC: 142 MG/DL
GLUCOSE 2H P 100 G GLC PO SERPL-MCNC: 142 MG/DL
GLUCOSE 3H P 100 G GLC PO SERPL-MCNC: 126 MG/DL
GLUCOSE P FAST SERPL-MCNC: 71 MG/DL (ref 72–112)
HCT VFR BLD AUTO: 37.1 % (ref 34–46.6)
HGB BLD-MCNC: 11.6 G/DL (ref 12–15.9)
IMM GRANULOCYTES # BLD AUTO: 0.11 10*3/MM3 (ref 0–0.05)
IMM GRANULOCYTES NFR BLD AUTO: 1.2 % (ref 0–0.5)
LYMPHOCYTES # BLD AUTO: 1.76 10*3/MM3 (ref 0.7–3.1)
LYMPHOCYTES NFR BLD AUTO: 19.6 % (ref 19.6–45.3)
MCH RBC QN AUTO: 28.8 PG (ref 26.6–33)
MCHC RBC AUTO-ENTMCNC: 31.3 G/DL (ref 31.5–35.7)
MCV RBC AUTO: 92.1 FL (ref 79–97)
MONOCYTES # BLD AUTO: 0.63 10*3/MM3 (ref 0.1–0.9)
MONOCYTES NFR BLD AUTO: 7 % (ref 5–12)
NEUTROPHILS # BLD AUTO: 5.86 10*3/MM3 (ref 1.4–7)
NEUTROPHILS NFR BLD AUTO: 65.5 % (ref 42.7–76)
NRBC BLD AUTO-RTO: 0 /100 WBC (ref 0–0)
PLATELET # BLD AUTO: 177 10*3/MM3 (ref 140–450)
RBC # BLD AUTO: 4.03 10*6/MM3 (ref 3.77–5.28)
WBC # BLD AUTO: 8.96 10*3/MM3 (ref 3.4–10.8)

## 2019-02-25 ENCOUNTER — TELEPHONE (OUTPATIENT)
Dept: OBSTETRICS AND GYNECOLOGY | Age: 24
End: 2019-02-25

## 2019-02-25 NOTE — TELEPHONE ENCOUNTER
----- Message from Angeline Patel MD sent at 2/25/2019  9:43 AM EST -----  Call Jana, she passes her 3 hour, her hgb is slightly decreased, recommend she add iron supplement every-other-day

## 2019-02-26 ENCOUNTER — TELEPHONE (OUTPATIENT)
Dept: CARDIOLOGY | Facility: CLINIC | Age: 24
End: 2019-02-26

## 2019-02-26 NOTE — TELEPHONE ENCOUNTER
Dr. Terrazas is out of the office today. Patricia at her office said she would give her the message upon her return...Naima

## 2019-02-26 NOTE — TELEPHONE ENCOUNTER
Pt called today anxious due to her HR getting high during the day (172,137,126) and low while she is sleeping (49). Pt was prescribed verapamil 40 mg but she is not taking because of the low HR without medication. Pt is currently pregnant. What do you recommend...Naima

## 2019-02-26 NOTE — TELEPHONE ENCOUNTER
Thank you.  Let patient know I'll touch base with her after s/w Dr Terrazas.  I need her involved re: what to next.    JERRY

## 2019-02-26 NOTE — TELEPHONE ENCOUNTER
"I s/w Dr. Terrazas by phone.    A HR of 49 in a sleeping, healthy young woman is completely normal.  Her sinus tachycardia is not dangerous.  It is simply not dangerous to her or her fetus.     It is being driven substantially by her anxiety and repeated testing throughout the day.    It does not actually require any pharmacotherapy.  If she feels poorly enough that she feels she needs a medication, then it is safe to do so for symptomatic control and her heart will \"not stop\" at night.    She is having fingers tingling and hyperventilating at the same time and I highly suspect these are panic attacks and that the panic needs to be treated more than anything else.  Alternatingly, she reports \"gagging and coughing\" that requires her inhaler; could her asthma be getting worse with pregnancy?    JDK  "

## 2019-03-05 ENCOUNTER — ROUTINE PRENATAL (OUTPATIENT)
Dept: OBSTETRICS AND GYNECOLOGY | Age: 24
End: 2019-03-05

## 2019-03-05 VITALS — SYSTOLIC BLOOD PRESSURE: 122 MMHG | WEIGHT: 212 LBS | BODY MASS INDEX: 37.55 KG/M2 | DIASTOLIC BLOOD PRESSURE: 60 MMHG

## 2019-03-05 DIAGNOSIS — O09.299 HX OF PREECLAMPSIA, PRIOR PREGNANCY, CURRENTLY PREGNANT: ICD-10-CM

## 2019-03-05 DIAGNOSIS — O36.1990 MATERNAL ATYPICAL ANTIBODY AFFECTING PREGNANCY, ANTEPARTUM, SINGLE OR UNSPECIFIED FETUS: ICD-10-CM

## 2019-03-05 DIAGNOSIS — O34.219 PREVIOUS CESAREAN DELIVERY AFFECTING PREGNANCY: ICD-10-CM

## 2019-03-05 DIAGNOSIS — Z3A.30 30 WEEKS GESTATION OF PREGNANCY: Primary | ICD-10-CM

## 2019-03-05 PROBLEM — O14.90 PRE-ECLAMPSIA, ANTEPARTUM: Status: RESOLVED | Noted: 2018-10-01 | Resolved: 2019-03-05

## 2019-03-05 PROCEDURE — 0502F SUBSEQUENT PRENATAL CARE: CPT | Performed by: OBSTETRICS & GYNECOLOGY

## 2019-03-05 NOTE — PROGRESS NOTES
CC: pt presents for routine f/u  HPI: no issues today; she denies bleeding/leaking fluid; no regular ctx; positive fetal movement  She spoke with cardiologist due to tachycardia; Dr. Lopez feels episodes are expected and plans observation since pt did not tolerate medical treatment  ROS:  Pulm: neg for soa  CV: pos for occ increased heart rate  : neg for ctx,bleeding  O: fh=31  Ext: no edema or cords    BPP 8/8 with MFM 3/4/19    A/p: 30 weeks: + ab screen, pt having weekly dopplers with [facility], stable with testing yesterday; normal growth noted ( 83 %) last week    Continue daily fmc's and PTL warnings, f/u here 2 weeks or prn; pt plans repeat c/s, holding on booking since delivery prior to 39 weeks possible given + ab screen with rising titer. MFM will determine timing of delivery based on weekly testing    Hx preeclampsia: BP stable and pt monitoring at home/work, she is on baby asa currently as well

## 2019-03-19 ENCOUNTER — ROUTINE PRENATAL (OUTPATIENT)
Dept: OBSTETRICS AND GYNECOLOGY | Age: 24
End: 2019-03-19

## 2019-03-19 VITALS — WEIGHT: 212.8 LBS | SYSTOLIC BLOOD PRESSURE: 120 MMHG | DIASTOLIC BLOOD PRESSURE: 60 MMHG | BODY MASS INDEX: 37.7 KG/M2

## 2019-03-19 DIAGNOSIS — Z3A.32 32 WEEKS GESTATION OF PREGNANCY: Primary | ICD-10-CM

## 2019-03-19 PROCEDURE — 0502F SUBSEQUENT PRENATAL CARE: CPT | Performed by: OBSTETRICS & GYNECOLOGY

## 2019-03-19 NOTE — PROGRESS NOTES
Pt presents for f/u visit; she saw MFM yesterday and had normal dopplers with no indication of anemia but BPP was 6/8 initially; repeat was 8/8. She has f/u in 2 days with Dr. Terrazas; she notes he is passing fmc's currently  She denies regular ctx or vaginal bleeding/leaking  O: fh=33 cm  FHT's 133, ext: no edema or cords  A/p: 32 weeks with little c ab: MFM following with weekly dopplers due to elevation of ab titer; she has repeat BPP in 2 days; she will continue twice daily fmc's and go to [facility] if not passing    Hx preeclampsia: BP stable, reviewed preeclamptic warnings, pt will continue to monitor BP at work and she is on baby asa    Will begin weekly visits here    Prior c/s X 2: plan repeat c/s, pt declines tubal. Not scheduled yet awaiting recommendations from [facility], will likely need prior to 39 weeks due to + ab titer and potential recurrent preelcampsia

## 2019-03-29 ENCOUNTER — ROUTINE PRENATAL (OUTPATIENT)
Dept: OBSTETRICS AND GYNECOLOGY | Age: 24
End: 2019-03-29

## 2019-03-29 VITALS — WEIGHT: 212 LBS | BODY MASS INDEX: 37.55 KG/M2 | DIASTOLIC BLOOD PRESSURE: 80 MMHG | SYSTOLIC BLOOD PRESSURE: 128 MMHG

## 2019-03-29 DIAGNOSIS — Z3A.33 33 WEEKS GESTATION OF PREGNANCY: ICD-10-CM

## 2019-03-29 DIAGNOSIS — O36.1990 MATERNAL ATYPICAL ANTIBODY AFFECTING PREGNANCY, ANTEPARTUM, SINGLE OR UNSPECIFIED FETUS: Primary | ICD-10-CM

## 2019-03-29 DIAGNOSIS — O09.299 HX OF PREECLAMPSIA, PRIOR PREGNANCY, CURRENTLY PREGNANT: ICD-10-CM

## 2019-03-29 DIAGNOSIS — O34.219 PREVIOUS CESAREAN DELIVERY AFFECTING PREGNANCY: ICD-10-CM

## 2019-03-29 PROBLEM — O99.019 ANTEPARTUM ANEMIA: Status: ACTIVE | Noted: 2019-03-29

## 2019-03-29 PROCEDURE — 0502F SUBSEQUENT PRENATAL CARE: CPT | Performed by: OBSTETRICS & GYNECOLOGY

## 2019-03-29 NOTE — PROGRESS NOTES
S: no issues today, notes active fetal movement, no regular ctx, bleeding or leaking fluid. Denies headache or vision changes  O: see chart  BPP and dopplers normal with Dr. Terrazas on 3/25; growth at 77 %    A/p: 33 weeks: little c ab pos: MFM following; having weekly dopplers and BPP, growth is good as well    Prior c/s X 2: pt planning repeat; have not booked yet due to + ab status, MFM advised pt she believes she will likely progress to term; reviewed optional days, will hold on booking since she may need earlier based on testing    Hx preeclampsia: pt had with prior 2 pregnancies and was delivered at 37 weeks; she is on baby asa and reviewed preeclamptic warnings, pt will continue to monitor BP at work as well; f/u 1 week    Mild anemia: pt notes she is taking iron in addition to PNV    F/u weekly, advised daily fmc's and call or to L&D with preeclamptic symptoms or elevated BP

## 2019-04-05 ENCOUNTER — ROUTINE PRENATAL (OUTPATIENT)
Dept: OBSTETRICS AND GYNECOLOGY | Age: 24
End: 2019-04-05

## 2019-04-05 VITALS — DIASTOLIC BLOOD PRESSURE: 70 MMHG | SYSTOLIC BLOOD PRESSURE: 120 MMHG | BODY MASS INDEX: 38.09 KG/M2 | WEIGHT: 215 LBS

## 2019-04-05 DIAGNOSIS — O36.1990 MATERNAL ATYPICAL ANTIBODY AFFECTING PREGNANCY, ANTEPARTUM, SINGLE OR UNSPECIFIED FETUS: Primary | ICD-10-CM

## 2019-04-05 PROCEDURE — 0502F SUBSEQUENT PRENATAL CARE: CPT | Performed by: NURSE PRACTITIONER

## 2019-04-05 NOTE — PROGRESS NOTES
Here for follow up 34w5d, no problems or concerns.  Good fetal movement. No bleeding or contractions.  NO headaches or vision changes.  She takes daily baby aspirin.  A/P normal assessment, FH and FHT.  Saw MFM on 4/1 and had BPP 8/8 and normal dopplers.  ISRAEL 23. PIH and labor warnings reviewed.  [facility] discussed. Follow up as scheduled next week.

## 2019-04-07 NOTE — PROGRESS NOTES
I have reviewed the notes, assessments, and/or procedures performed by ADAN Colón, I concur with her/his documentation of Jana Nix.

## 2019-04-10 ENCOUNTER — ROUTINE PRENATAL (OUTPATIENT)
Dept: OBSTETRICS AND GYNECOLOGY | Age: 24
End: 2019-04-10

## 2019-04-10 VITALS — WEIGHT: 216 LBS | BODY MASS INDEX: 38.26 KG/M2 | SYSTOLIC BLOOD PRESSURE: 110 MMHG | DIASTOLIC BLOOD PRESSURE: 70 MMHG

## 2019-04-10 DIAGNOSIS — O36.1990 MATERNAL ATYPICAL ANTIBODY AFFECTING PREGNANCY, ANTEPARTUM, SINGLE OR UNSPECIFIED FETUS: ICD-10-CM

## 2019-04-10 DIAGNOSIS — Z36.85 ANTENATAL SCREENING FOR STREPTOCOCCUS B: ICD-10-CM

## 2019-04-10 DIAGNOSIS — Z98.891 H/O CESAREAN SECTION: ICD-10-CM

## 2019-04-10 DIAGNOSIS — Z3A.35 35 WEEKS GESTATION OF PREGNANCY: ICD-10-CM

## 2019-04-10 DIAGNOSIS — O09.299 HX OF PREECLAMPSIA, PRIOR PREGNANCY, CURRENTLY PREGNANT: Primary | ICD-10-CM

## 2019-04-10 PROCEDURE — 0502F SUBSEQUENT PRENATAL CARE: CPT | Performed by: OBSTETRICS & GYNECOLOGY

## 2019-04-10 NOTE — PROGRESS NOTES
Pt is here for routine f/u, she notes active fetal movement; she denies any severe headaches, vision changes or abdominal pain; she does not a mild increase in swelling; BP's at work are normal  O: BPP with [facility]: 8/8 with normal dopplers on 4/8/19  Lower ext: 1+ edema, no cords or tenderness; 2+ DTR's  A/p: hx preeclampsia: BP stable, pt will continue to monitor for preeclamptic symptoms and monitor BP at work  Pt will continue baby asa; continue daily fmc's    little c ab pos: MFM at [facility] following; pt having weekly BPP and dopplers; current plan is delivery at term    Prior c/s: pt plans delivery via repeat c/s, plan at 38 to 39 weeks or prn other indications; pt agrees    GBS done

## 2019-04-12 LAB — GP B STREP DNA SPEC QL NAA+PROBE: NEGATIVE

## 2019-04-15 ENCOUNTER — TELEPHONE (OUTPATIENT)
Dept: OBSTETRICS AND GYNECOLOGY | Age: 24
End: 2019-04-15

## 2019-04-15 NOTE — TELEPHONE ENCOUNTER
----- Message from Angeline Patel MD sent at 4/12/2019  5:53 PM EDT -----  Call pt, GBS is negative/normal

## 2019-04-16 ENCOUNTER — PREP FOR SURGERY (OUTPATIENT)
Dept: OTHER | Facility: HOSPITAL | Age: 24
End: 2019-04-16

## 2019-04-16 ENCOUNTER — ROUTINE PRENATAL (OUTPATIENT)
Dept: OBSTETRICS AND GYNECOLOGY | Age: 24
End: 2019-04-16

## 2019-04-16 VITALS — BODY MASS INDEX: 38.19 KG/M2 | WEIGHT: 215.6 LBS | DIASTOLIC BLOOD PRESSURE: 70 MMHG | SYSTOLIC BLOOD PRESSURE: 120 MMHG

## 2019-04-16 DIAGNOSIS — O36.1930 MATERNAL ATYPICAL ANTIBODY AFFECTING PREGNANCY IN THIRD TRIMESTER, SINGLE OR UNSPECIFIED FETUS: Primary | ICD-10-CM

## 2019-04-16 DIAGNOSIS — O09.299 HX OF PREECLAMPSIA, PRIOR PREGNANCY, CURRENTLY PREGNANT: ICD-10-CM

## 2019-04-16 DIAGNOSIS — O36.1990 MATERNAL ATYPICAL ANTIBODY AFFECTING PREGNANCY, ANTEPARTUM, SINGLE OR UNSPECIFIED FETUS: ICD-10-CM

## 2019-04-16 DIAGNOSIS — Z3A.36 36 WEEKS GESTATION OF PREGNANCY: Primary | ICD-10-CM

## 2019-04-16 DIAGNOSIS — O34.219 PREVIOUS CESAREAN DELIVERY AFFECTING PREGNANCY: ICD-10-CM

## 2019-04-16 PROCEDURE — 0502F SUBSEQUENT PRENATAL CARE: CPT | Performed by: OBSTETRICS & GYNECOLOGY

## 2019-04-16 NOTE — PROGRESS NOTES
Contacted MFM at [facility] by phone to discuss timing of c/s. Dr. Ragsdale is available today and familiar with pt's case.  Based on pt's little c ab, she recommends proceeding with c/s at 38 to 39 week unless pt develops other indications for delivery. She also notes this is consistent with prior plan per Dr. Terrazas. Scheduling order placed. Pt notified.

## 2019-04-17 ENCOUNTER — TELEPHONE (OUTPATIENT)
Dept: OBSTETRICS AND GYNECOLOGY | Age: 24
End: 2019-04-17

## 2019-04-17 ENCOUNTER — DOCUMENTATION (OUTPATIENT)
Dept: OBSTETRICS AND GYNECOLOGY | Age: 24
End: 2019-04-17

## 2019-04-17 PROBLEM — O36.1930 MATERNAL ATYPICAL ANTIBODY COMPLICATING PREGNANCY IN THIRD TRIMESTER: Status: ACTIVE | Noted: 2019-04-17

## 2019-04-23 ENCOUNTER — ROUTINE PRENATAL (OUTPATIENT)
Dept: OBSTETRICS AND GYNECOLOGY | Age: 24
End: 2019-04-23

## 2019-04-23 VITALS — DIASTOLIC BLOOD PRESSURE: 68 MMHG | BODY MASS INDEX: 38.44 KG/M2 | WEIGHT: 217 LBS | SYSTOLIC BLOOD PRESSURE: 120 MMHG

## 2019-04-23 DIAGNOSIS — O09.299 HX OF PREECLAMPSIA, PRIOR PREGNANCY, CURRENTLY PREGNANT: Primary | ICD-10-CM

## 2019-04-23 DIAGNOSIS — Z98.891 H/O CESAREAN SECTION: ICD-10-CM

## 2019-04-23 DIAGNOSIS — O36.1930 MATERNAL ATYPICAL ANTIBODY AFFECTING PREGNANCY IN THIRD TRIMESTER, SINGLE OR UNSPECIFIED FETUS: ICD-10-CM

## 2019-04-23 PROCEDURE — 0502F SUBSEQUENT PRENATAL CARE: CPT | Performed by: OBSTETRICS & GYNECOLOGY

## 2019-04-23 NOTE — PROGRESS NOTES
Pt here for scheduled visit; no issues today except occasional ctx and pelvic pressure; she denies regular ctx, bleeding or leaking fluid; she notes active fetal movement; she denies severe headaches, vision changes or abdominal pain  O: BPP 8/8 and growth normal ( > 70 % )with MFM yesterday  A/p: little c ab positive: pt having weekly dopplers and BPP with MFM, delivery at 38 weeks scheduled as recommended by MFM, continue daily fmc    Hx preeclampsia: BP stable today and pt notes good values with self-monitoring, reviewed preeclamptic warnings    Hx c/s X 2: reviewed labor warnings, c/s next week and reviewed preoperative instrucitons

## 2019-05-01 ENCOUNTER — ANESTHESIA (OUTPATIENT)
Dept: LABOR AND DELIVERY | Facility: HOSPITAL | Age: 24
End: 2019-05-01

## 2019-05-01 ENCOUNTER — HOSPITAL ENCOUNTER (INPATIENT)
Facility: HOSPITAL | Age: 24
LOS: 4 days | Discharge: HOME | End: 2019-05-05
Attending: OBSTETRICS & GYNECOLOGY | Admitting: OBSTETRICS & GYNECOLOGY

## 2019-05-01 ENCOUNTER — ANESTHESIA EVENT (OUTPATIENT)
Dept: LABOR AND DELIVERY | Facility: HOSPITAL | Age: 24
End: 2019-05-01

## 2019-05-01 PROBLEM — Z87.59 HISTORY OF POSTPARTUM DEPRESSION: Status: ACTIVE | Noted: 2019-05-01

## 2019-05-01 PROBLEM — Z86.59 HISTORY OF POSTPARTUM DEPRESSION: Status: ACTIVE | Noted: 2019-05-01

## 2019-05-01 LAB
ABO GROUP BLD: NORMAL
ANTI-LITTLE C: NORMAL
BASOPHILS # BLD AUTO: 0.02 10*3/MM3 (ref 0–0.2)
BASOPHILS NFR BLD AUTO: 0.1 % (ref 0–1.5)
BLD GP AB SCN SERPL QL: POSITIVE
DEPRECATED RDW RBC AUTO: 40.7 FL (ref 37–54)
DEPRECATED RDW RBC AUTO: 40.7 FL (ref 37–54)
EOSINOPHIL # BLD AUTO: 0.25 10*3/MM3 (ref 0–0.4)
EOSINOPHIL NFR BLD AUTO: 1.7 % (ref 0.3–6.2)
ERYTHROCYTE [DISTWIDTH] IN BLOOD BY AUTOMATED COUNT: 13.7 % (ref 12.3–15.4)
ERYTHROCYTE [DISTWIDTH] IN BLOOD BY AUTOMATED COUNT: 13.9 % (ref 12.3–15.4)
GLUCOSE BLDC GLUCOMTR-MCNC: 105 MG/DL (ref 70–130)
HCT VFR BLD AUTO: 31.8 % (ref 34–46.6)
HCT VFR BLD AUTO: 33.6 % (ref 34–46.6)
HGB BLD-MCNC: 10.7 G/DL (ref 12–15.9)
HGB BLD-MCNC: 11.6 G/DL (ref 12–15.9)
IMM GRANULOCYTES # BLD AUTO: 0.13 10*3/MM3 (ref 0–0.05)
IMM GRANULOCYTES NFR BLD AUTO: 0.9 % (ref 0–0.5)
LYMPHOCYTES # BLD AUTO: 2.12 10*3/MM3 (ref 0.7–3.1)
LYMPHOCYTES NFR BLD AUTO: 14.4 % (ref 19.6–45.3)
MCH RBC QN AUTO: 27.9 PG (ref 26.6–33)
MCH RBC QN AUTO: 28.6 PG (ref 26.6–33)
MCHC RBC AUTO-ENTMCNC: 33.6 G/DL (ref 31.5–35.7)
MCHC RBC AUTO-ENTMCNC: 34.5 G/DL (ref 31.5–35.7)
MCV RBC AUTO: 82.8 FL (ref 79–97)
MCV RBC AUTO: 83 FL (ref 79–97)
MONOCYTES # BLD AUTO: 0.91 10*3/MM3 (ref 0.1–0.9)
MONOCYTES NFR BLD AUTO: 6.2 % (ref 5–12)
NEUTROPHILS # BLD AUTO: 11.26 10*3/MM3 (ref 1.7–7)
NEUTROPHILS NFR BLD AUTO: 76.7 % (ref 42.7–76)
NRBC BLD AUTO-RTO: 0 /100 WBC (ref 0–0.2)
PLATELET # BLD AUTO: 149 10*3/MM3 (ref 140–450)
PLATELET # BLD AUTO: 162 10*3/MM3 (ref 140–450)
PMV BLD AUTO: 11.5 FL (ref 6–12)
PMV BLD AUTO: 11.6 FL (ref 6–12)
RBC # BLD AUTO: 3.83 10*6/MM3 (ref 3.77–5.28)
RBC # BLD AUTO: 4.06 10*6/MM3 (ref 3.77–5.28)
RH BLD: POSITIVE
T&S EXPIRATION DATE: NORMAL
WBC NRBC COR # BLD: 10.37 10*3/MM3 (ref 3.4–10.8)
WBC NRBC COR # BLD: 14.69 10*3/MM3 (ref 3.4–10.8)

## 2019-05-01 PROCEDURE — 86920 COMPATIBILITY TEST SPIN: CPT

## 2019-05-01 PROCEDURE — 85027 COMPLETE CBC AUTOMATED: CPT | Performed by: OBSTETRICS & GYNECOLOGY

## 2019-05-01 PROCEDURE — 25010000002 MORPHINE PER 10 MG: Performed by: ANESTHESIOLOGY

## 2019-05-01 PROCEDURE — 82962 GLUCOSE BLOOD TEST: CPT

## 2019-05-01 PROCEDURE — 88307 TISSUE EXAM BY PATHOLOGIST: CPT

## 2019-05-01 PROCEDURE — 86850 RBC ANTIBODY SCREEN: CPT | Performed by: OBSTETRICS & GYNECOLOGY

## 2019-05-01 PROCEDURE — 25010000002 DIPHENHYDRAMINE PER 50 MG: Performed by: NURSE ANESTHETIST, CERTIFIED REGISTERED

## 2019-05-01 PROCEDURE — 86922 COMPATIBILITY TEST ANTIGLOB: CPT

## 2019-05-01 PROCEDURE — 25010000002 ONDANSETRON PER 1 MG: Performed by: ANESTHESIOLOGY

## 2019-05-01 PROCEDURE — 86900 BLOOD TYPING SEROLOGIC ABO: CPT | Performed by: OBSTETRICS & GYNECOLOGY

## 2019-05-01 PROCEDURE — 25010000002 ONDANSETRON PER 1 MG: Performed by: NURSE ANESTHETIST, CERTIFIED REGISTERED

## 2019-05-01 PROCEDURE — 86902 BLOOD TYPE ANTIGEN DONOR EA: CPT

## 2019-05-01 PROCEDURE — 25010000002 FENTANYL CITRATE (PF) 100 MCG/2ML SOLUTION: Performed by: ANESTHESIOLOGY

## 2019-05-01 PROCEDURE — 59510 CESAREAN DELIVERY: CPT | Performed by: OBSTETRICS & GYNECOLOGY

## 2019-05-01 PROCEDURE — 85025 COMPLETE CBC W/AUTO DIFF WBC: CPT | Performed by: OBSTETRICS & GYNECOLOGY

## 2019-05-01 PROCEDURE — 25010000003 CEFAZOLIN IN DEXTROSE 2-4 GM/100ML-% SOLUTION: Performed by: OBSTETRICS & GYNECOLOGY

## 2019-05-01 PROCEDURE — 86870 RBC ANTIBODY IDENTIFICATION: CPT | Performed by: OBSTETRICS & GYNECOLOGY

## 2019-05-01 PROCEDURE — S0260 H&P FOR SURGERY: HCPCS | Performed by: OBSTETRICS & GYNECOLOGY

## 2019-05-01 PROCEDURE — 25010000002 METHYLERGONOVINE MALEATE PER 0.2 MG: Performed by: OBSTETRICS & GYNECOLOGY

## 2019-05-01 PROCEDURE — 86901 BLOOD TYPING SEROLOGIC RH(D): CPT | Performed by: OBSTETRICS & GYNECOLOGY

## 2019-05-01 PROCEDURE — 6A550ZT PHERESIS OF CORD BLOOD STEM CELLS, SINGLE: ICD-10-PCS | Performed by: OBSTETRICS & GYNECOLOGY

## 2019-05-01 RX ORDER — DIPHENHYDRAMINE HYDROCHLORIDE 50 MG/ML
25 INJECTION INTRAMUSCULAR; INTRAVENOUS EVERY 4 HOURS PRN
Status: DISCONTINUED | OUTPATIENT
Start: 2019-05-01 | End: 2019-05-05 | Stop reason: HOSPADM

## 2019-05-01 RX ORDER — OXYTOCIN-SODIUM CHLORIDE 0.9% IV SOLN 30 UNIT/500ML 30-0.9/5 UT/ML-%
999 SOLUTION INTRAVENOUS ONCE
Status: COMPLETED | OUTPATIENT
Start: 2019-05-01 | End: 2019-05-01

## 2019-05-01 RX ORDER — OXYCODONE HYDROCHLORIDE AND ACETAMINOPHEN 5; 325 MG/1; MG/1
1 TABLET ORAL EVERY 4 HOURS PRN
Status: DISCONTINUED | OUTPATIENT
Start: 2019-05-01 | End: 2019-05-05 | Stop reason: HOSPADM

## 2019-05-01 RX ORDER — MORPHINE SULFATE 2 MG/ML
2 INJECTION, SOLUTION INTRAMUSCULAR; INTRAVENOUS
Status: ACTIVE | OUTPATIENT
Start: 2019-05-01 | End: 2019-05-02

## 2019-05-01 RX ORDER — SODIUM CHLORIDE, SODIUM LACTATE, POTASSIUM CHLORIDE, CALCIUM CHLORIDE 600; 310; 30; 20 MG/100ML; MG/100ML; MG/100ML; MG/100ML
125 INJECTION, SOLUTION INTRAVENOUS CONTINUOUS
Status: DISCONTINUED | OUTPATIENT
Start: 2019-05-01 | End: 2019-05-01

## 2019-05-01 RX ORDER — EPHEDRINE SULFATE 50 MG/ML
INJECTION, SOLUTION INTRAVENOUS AS NEEDED
Status: DISCONTINUED | OUTPATIENT
Start: 2019-05-01 | End: 2019-05-01 | Stop reason: SURG

## 2019-05-01 RX ORDER — SODIUM CHLORIDE, SODIUM LACTATE, POTASSIUM CHLORIDE, CALCIUM CHLORIDE 600; 310; 30; 20 MG/100ML; MG/100ML; MG/100ML; MG/100ML
INJECTION, SOLUTION INTRAVENOUS CONTINUOUS PRN
Status: DISCONTINUED | OUTPATIENT
Start: 2019-05-01 | End: 2019-05-01 | Stop reason: SURG

## 2019-05-01 RX ORDER — ERYTHROMYCIN 5 MG/G
OINTMENT OPHTHALMIC
Status: DISPENSED
Start: 2019-05-01 | End: 2019-05-01

## 2019-05-01 RX ORDER — ONDANSETRON 2 MG/ML
4 INJECTION INTRAMUSCULAR; INTRAVENOUS EVERY 6 HOURS PRN
Status: DISCONTINUED | OUTPATIENT
Start: 2019-05-01 | End: 2019-05-05 | Stop reason: HOSPADM

## 2019-05-01 RX ORDER — ONDANSETRON 2 MG/ML
4 INJECTION INTRAMUSCULAR; INTRAVENOUS ONCE AS NEEDED
Status: COMPLETED | OUTPATIENT
Start: 2019-05-01 | End: 2019-05-01

## 2019-05-01 RX ORDER — METHYLERGONOVINE MALEATE 0.2 MG/ML
200 INJECTION INTRAVENOUS ONCE AS NEEDED
Status: DISCONTINUED | OUTPATIENT
Start: 2019-05-01 | End: 2019-05-05 | Stop reason: HOSPADM

## 2019-05-01 RX ORDER — METHYLERGONOVINE MALEATE 0.2 MG/ML
200 INJECTION INTRAVENOUS ONCE AS NEEDED
Status: COMPLETED | OUTPATIENT
Start: 2019-05-01 | End: 2019-05-01

## 2019-05-01 RX ORDER — MISOPROSTOL 200 UG/1
600 TABLET ORAL ONCE AS NEEDED
Status: DISCONTINUED | OUTPATIENT
Start: 2019-05-01 | End: 2019-05-05 | Stop reason: HOSPADM

## 2019-05-01 RX ORDER — ONDANSETRON 2 MG/ML
4 INJECTION INTRAMUSCULAR; INTRAVENOUS ONCE AS NEEDED
Status: DISCONTINUED | OUTPATIENT
Start: 2019-05-01 | End: 2019-05-05 | Stop reason: HOSPADM

## 2019-05-01 RX ORDER — OXYTOCIN-SODIUM CHLORIDE 0.9% IV SOLN 30 UNIT/500ML 30-0.9/5 UT/ML-%
125 SOLUTION INTRAVENOUS CONTINUOUS PRN
Status: COMPLETED | OUTPATIENT
Start: 2019-05-01 | End: 2019-05-01

## 2019-05-01 RX ORDER — SODIUM CHLORIDE 0.9 % (FLUSH) 0.9 %
3 SYRINGE (ML) INJECTION EVERY 12 HOURS SCHEDULED
Status: DISCONTINUED | OUTPATIENT
Start: 2019-05-01 | End: 2019-05-01

## 2019-05-01 RX ORDER — SODIUM CHLORIDE 0.9 % (FLUSH) 0.9 %
3-10 SYRINGE (ML) INJECTION AS NEEDED
Status: DISCONTINUED | OUTPATIENT
Start: 2019-05-01 | End: 2019-05-01

## 2019-05-01 RX ORDER — ONDANSETRON 2 MG/ML
INJECTION INTRAMUSCULAR; INTRAVENOUS AS NEEDED
Status: DISCONTINUED | OUTPATIENT
Start: 2019-05-01 | End: 2019-05-01 | Stop reason: SURG

## 2019-05-01 RX ORDER — CARBOPROST TROMETHAMINE 250 UG/ML
250 INJECTION, SOLUTION INTRAMUSCULAR AS NEEDED
Status: DISCONTINUED | OUTPATIENT
Start: 2019-05-01 | End: 2019-05-01

## 2019-05-01 RX ORDER — CEFAZOLIN SODIUM 2 G/100ML
2 INJECTION, SOLUTION INTRAVENOUS ONCE
Status: COMPLETED | OUTPATIENT
Start: 2019-05-01 | End: 2019-05-01

## 2019-05-01 RX ORDER — ACETAMINOPHEN 500 MG
1000 TABLET ORAL ONCE
Status: COMPLETED | OUTPATIENT
Start: 2019-05-01 | End: 2019-05-01

## 2019-05-01 RX ORDER — FENTANYL CITRATE 50 UG/ML
INJECTION, SOLUTION INTRAMUSCULAR; INTRAVENOUS
Status: COMPLETED | OUTPATIENT
Start: 2019-05-01 | End: 2019-05-01

## 2019-05-01 RX ORDER — OXYTOCIN-SODIUM CHLORIDE 0.9% IV SOLN 30 UNIT/500ML 30-0.9/5 UT/ML-%
250 SOLUTION INTRAVENOUS CONTINUOUS
Status: DISPENSED | OUTPATIENT
Start: 2019-05-01 | End: 2019-05-01

## 2019-05-01 RX ORDER — PHYTONADIONE 2 MG/ML
INJECTION, EMULSION INTRAMUSCULAR; INTRAVENOUS; SUBCUTANEOUS
Status: DISPENSED
Start: 2019-05-01 | End: 2019-05-01

## 2019-05-01 RX ORDER — ALBUTEROL SULFATE 2.5 MG/3ML
2.5 SOLUTION RESPIRATORY (INHALATION) EVERY 4 HOURS PRN
Status: DISCONTINUED | OUTPATIENT
Start: 2019-05-01 | End: 2019-05-01

## 2019-05-01 RX ORDER — MORPHINE SULFATE 1 MG/ML
INJECTION, SOLUTION EPIDURAL; INTRATHECAL; INTRAVENOUS
Status: COMPLETED | OUTPATIENT
Start: 2019-05-01 | End: 2019-05-01

## 2019-05-01 RX ORDER — HYDROMORPHONE HYDROCHLORIDE 1 MG/ML
0.5 INJECTION, SOLUTION INTRAMUSCULAR; INTRAVENOUS; SUBCUTANEOUS
Status: ACTIVE | OUTPATIENT
Start: 2019-05-01 | End: 2019-05-02

## 2019-05-01 RX ORDER — IBUPROFEN 600 MG/1
600 TABLET ORAL EVERY 8 HOURS PRN
Status: DISCONTINUED | OUTPATIENT
Start: 2019-05-01 | End: 2019-05-05 | Stop reason: HOSPADM

## 2019-05-01 RX ORDER — LANOLIN
CREAM (ML) TOPICAL
Status: DISCONTINUED | OUTPATIENT
Start: 2019-05-01 | End: 2019-05-05 | Stop reason: HOSPADM

## 2019-05-01 RX ORDER — SIMETHICONE 80 MG
80 TABLET,CHEWABLE ORAL 4 TIMES DAILY PRN
Status: DISCONTINUED | OUTPATIENT
Start: 2019-05-01 | End: 2019-05-05 | Stop reason: HOSPADM

## 2019-05-01 RX ORDER — OXYCODONE HYDROCHLORIDE AND ACETAMINOPHEN 5; 325 MG/1; MG/1
2 TABLET ORAL EVERY 4 HOURS PRN
Status: DISCONTINUED | OUTPATIENT
Start: 2019-05-01 | End: 2019-05-05 | Stop reason: HOSPADM

## 2019-05-01 RX ORDER — LIDOCAINE HYDROCHLORIDE 10 MG/ML
5 INJECTION, SOLUTION EPIDURAL; INFILTRATION; INTRACAUDAL; PERINEURAL AS NEEDED
Status: DISCONTINUED | OUTPATIENT
Start: 2019-05-01 | End: 2019-05-01

## 2019-05-01 RX ORDER — MISOPROSTOL 200 UG/1
800 TABLET ORAL AS NEEDED
Status: DISCONTINUED | OUTPATIENT
Start: 2019-05-01 | End: 2019-05-01

## 2019-05-01 RX ORDER — DIPHENHYDRAMINE HCL 25 MG
25 CAPSULE ORAL EVERY 4 HOURS PRN
Status: DISCONTINUED | OUTPATIENT
Start: 2019-05-01 | End: 2019-05-05 | Stop reason: HOSPADM

## 2019-05-01 RX ORDER — DOCUSATE SODIUM 100 MG/1
100 CAPSULE, LIQUID FILLED ORAL 2 TIMES DAILY
Status: DISCONTINUED | OUTPATIENT
Start: 2019-05-01 | End: 2019-05-05 | Stop reason: HOSPADM

## 2019-05-01 RX ORDER — NALOXONE HCL 0.4 MG/ML
0.2 VIAL (ML) INJECTION
Status: DISCONTINUED | OUTPATIENT
Start: 2019-05-01 | End: 2019-05-05 | Stop reason: HOSPADM

## 2019-05-01 RX ORDER — BUDESONIDE AND FORMOTEROL FUMARATE DIHYDRATE 80; 4.5 UG/1; UG/1
2 AEROSOL RESPIRATORY (INHALATION)
Status: DISCONTINUED | OUTPATIENT
Start: 2019-05-01 | End: 2019-05-01

## 2019-05-01 RX ORDER — FAMOTIDINE 10 MG/ML
20 INJECTION, SOLUTION INTRAVENOUS ONCE AS NEEDED
Status: COMPLETED | OUTPATIENT
Start: 2019-05-01 | End: 2019-05-01

## 2019-05-01 RX ADMIN — METHYLERGONOVINE MALEATE 200 MCG: 0.2 INJECTION, SOLUTION INTRAMUSCULAR; INTRAVENOUS at 11:34

## 2019-05-01 RX ADMIN — ACETAMINOPHEN 1000 MG: 500 TABLET, FILM COATED ORAL at 10:14

## 2019-05-01 RX ADMIN — OXYTOCIN 125 ML/HR: 10 INJECTION INTRAVENOUS at 12:55

## 2019-05-01 RX ADMIN — MORPHINE SULFATE 200 MCG: 1 INJECTION EPIDURAL; INTRATHECAL; INTRAVENOUS at 11:00

## 2019-05-01 RX ADMIN — EPHEDRINE SULFATE 5 MG: 50 INJECTION INTRAMUSCULAR; INTRAVENOUS; SUBCUTANEOUS at 11:02

## 2019-05-01 RX ADMIN — SODIUM CHLORIDE, POTASSIUM CHLORIDE, SODIUM LACTATE AND CALCIUM CHLORIDE 1000 ML: 600; 310; 30; 20 INJECTION, SOLUTION INTRAVENOUS at 08:41

## 2019-05-01 RX ADMIN — IBUPROFEN 600 MG: 600 TABLET ORAL at 14:50

## 2019-05-01 RX ADMIN — DOCUSATE SODIUM 100 MG: 100 CAPSULE, LIQUID FILLED ORAL at 21:59

## 2019-05-01 RX ADMIN — SODIUM CHLORIDE, POTASSIUM CHLORIDE, SODIUM LACTATE AND CALCIUM CHLORIDE: 600; 310; 30; 20 INJECTION, SOLUTION INTRAVENOUS at 10:51

## 2019-05-01 RX ADMIN — FENTANYL CITRATE 10 MCG: 50 INJECTION INTRAMUSCULAR; INTRAVENOUS at 11:00

## 2019-05-01 RX ADMIN — DIPHENHYDRAMINE HYDROCHLORIDE 25 MG: 50 INJECTION, SOLUTION INTRAMUSCULAR; INTRAVENOUS at 13:10

## 2019-05-01 RX ADMIN — OXYCODONE AND ACETAMINOPHEN 1 TABLET: 5; 325 TABLET ORAL at 13:30

## 2019-05-01 RX ADMIN — SODIUM CHLORIDE, POTASSIUM CHLORIDE, SODIUM LACTATE AND CALCIUM CHLORIDE 125 ML/HR: 600; 310; 30; 20 INJECTION, SOLUTION INTRAVENOUS at 09:45

## 2019-05-01 RX ADMIN — OXYCODONE AND ACETAMINOPHEN 1 TABLET: 5; 325 TABLET ORAL at 17:59

## 2019-05-01 RX ADMIN — CEFAZOLIN SODIUM 2 G: 2 INJECTION, SOLUTION INTRAVENOUS at 10:48

## 2019-05-01 RX ADMIN — IBUPROFEN 600 MG: 600 TABLET ORAL at 23:09

## 2019-05-01 RX ADMIN — OXYTOCIN 999 ML/HR: 10 INJECTION INTRAVENOUS at 11:29

## 2019-05-01 RX ADMIN — OXYCODONE AND ACETAMINOPHEN 1 TABLET: 5; 325 TABLET ORAL at 21:59

## 2019-05-01 RX ADMIN — ONDANSETRON HYDROCHLORIDE 4 MG: 2 SOLUTION INTRAMUSCULAR; INTRAVENOUS at 10:14

## 2019-05-01 RX ADMIN — ONDANSETRON 4 MG: 2 INJECTION INTRAMUSCULAR; INTRAVENOUS at 11:05

## 2019-05-01 RX ADMIN — FAMOTIDINE 20 MG: 10 INJECTION INTRAVENOUS at 10:14

## 2019-05-01 NOTE — L&D DELIVERY NOTE
. SECTION FULL OPERATIVE REPORT  Pre-operative Diagnosis: 38 week intrauterine pregnancy with two previous  requesting repeat, positive anti c antibody  Post-operative Diagnosis: same  Procedure: Repeat Low Transverse  Section  Anesthesia: spinal  Surgeon(s): Angeline Patel MD  Assistant(s): Juan Pablo Dodge MD  Infant: LV male infant, Apgars 9/9, weight 8lbs, 12oz  Findings: Adhesions of uterus to anterior abdominal wall, normal tubes and ovaries  Complications: mild uterine atony  Specimens: placenta  EBL: 800 mL      Description of Procedure:  The patient was taken to the operating room where anesthesia was found to be adequate. She was prepped and draped in the usual sterile fashion in the dorsal supine position with a leftward tilt.   A Pfannenstiel skin incision was made overlying the prior with the scalpel and carried through the fat to the underlying layer of fascia. The fascia was then incised in the midline and the incision was extended laterally with navarro scissors. The superior aspect of the fascia was then grasped with Kocher clamps and the underlying rectus muscles were then dissected off bluntly with the Navarro scissors. The inferior aspect of the fascia was then grasped with Kocher clamps and dissected off similarly with Navarro scissors. The rectus muscles were  and the peritoneum entered. The peritoneal incision was then carried superiorly and inferiorly taking care to identify the bladder at the lower aspect. There were adhesions of the uterus to the anterior abdominal wall on the right.  The bladder blade was inserted. The vesicouterine peritoneum was then identified and entered sharply with Metzenbaum scissors and a bladder flap was created. The bladder blade was reinserted and the uterine incision was made in a low transverse fashion using the scalpel. This was extended digitally.  The bladder blade was removed and the infant was delivered atraumatically. The nose and  mouth were suctioned and the cord was clamped and cut after 30 seconds. The infant was taken to the warmer for the waiting staff. Cord blood was collected. The placenta was removed with gentle manual traction, uterus exteriorized, and the uterus was cleared of all clots and debris. A mild uterine atony was noted. This partially responded to fundal massage and pitocin. Methergine was given and tone normalized. The uterine incision was repaired in two layers using 0 vicryl suture. There were 2 areas of oozing at the right side of the incision that were made hemostatic with 0 monocryl suture. The uterus was examined and noted to be hemostatic and returned to the abdomen.  The posterior cul-de-sac and gutters were cleared of all clots and debris. The uterus was then re-inspected to ensure hemostasis as were all subfascial tissues. The fascia was re-approximated in a running fashion using 0-vicryl suture. The subcutaneous tissue was re-approximated in a running fashion with 3-0 vicryl. The skin was closed with 4-0 monocryl.    The patient tolerated the procedure well. Sponge, lap and needle counts were correct. The patient was taken to recovery in stable condition. She received antibiotic prophylaxis with Kefzol.    Angeline Patel MD  May 1, 2019

## 2019-05-01 NOTE — H&P
[facility]  Obstetric History and Physical    Chief Complaint   Patient presents with   • Scheduled      Isoimmunization (anti-C antibody)       Subjective     Patient is a 24 y.o. female  currently at 38w3d, who presents for scheduled  delivery. Patient reports active fetal movement and denies vaginal bleeding or leaking fluid. She denies headache, vision changes, abdominal pain or soa. She has had 2 prior  deliveries and understands risks of procedure to include bleeding with transfusion, infection, damage to organs, need for additional surgery due to complications, DVT/PE, anesthetic complications and even death. Patient desires to proceed. Patient declines tubal sterilization. Patient has been followed by [facility], Dr. Terrazas for anti c antibody and delivery at 38 weeks was advised. There have been no signs of fetal anemia with weekly dopplers and BPP's per [facility].       Her prenatal care is complicated by .  Patient Active Problem List   Diagnosis   • Previous  delivery affecting pregnancy   • Maternal atypical antibody complicating pregnancy   • Mild persistent asthma without complication   • Asthma   • Palpitations   • H/O  section   • Pregnancy   • Elevated glucose   • Hx of preeclampsia, prior pregnancy, currently pregnant   • Antepartum anemia   • Maternal atypical antibody complicating pregnancy in third trimester   • History of postpartum depression    Her previous obstetric/gynecological history is noted for 2 prior  deliveries and preeclampsia with prior pregnancies.    The following portions of the patients history were reviewed and updated as appropriate: current medications, allergies, past medical history, past surgical history, past family history, past social history and problem list .       Prenatal Information:  Prenatal Results     Initial Prenatal Labs     Test Value Reference Range Date Time    Hemoglobin 12.0 g/dL 11.9 - 15.5 g/dL 19  1044    Hematocrit 36.8 % 35.6 - 45.5 % 01/03/19 1044    Platelets 162 10*3/mm3 140 - 450 10*3/mm3 05/01/19 0838    Rubella IgG 2.91 index Immune >0.99 index 10/01/18 1141    Hepatitis B SAg Negative  Negative 10/01/18 1141    Hepatitis C Ab <0.1 s/co ratio 0.0 - 0.9 s/co ratio 10/01/18 1141    RPR Non Reactive  Non Reactive 10/01/18 1141    ABO A   10/04/18 0905    Rh Positive   10/04/18 0905    Antibody Screen Positive   10/04/18 0905    HIV Non Reactive  Non Reactive 10/01/18 1141    Urine Culture Final report   01/16/19 1215    Gonorrhea negative   10/01/18     Chlamydia negative   10/01/18     TSH 1.190 mIU/mL 0.270 - 4.200 mIU/mL 12/12/18 2104          2nd and 3rd Trimester     Test Value Reference Range Date Time    Hemoglobin (repeated) 11.6 g/dL 12.0 - 15.9 g/dL 05/01/19 0838    Hematocrit (repeated) 33.6 % 34.0 - 46.6 % 05/01/19 0838     mg/dL 65 - 139 mg/dL 01/03/19 1044    Antibody Screen (repeated)        GTT Fasting 71 mg/dL 72 - 112 mg/dL 02/22/19 0922    GTT 1 Hr 142 mg/dL mg/dL 02/22/19 0922    GTT 2 Hr 142 mg/dL mg/dL 02/22/19 0922    GTT 3 Hr 126 mg/dL mg/dL 02/22/19 0922    Group B Strep Negative  Negative 04/10/19 1128          Drug Screening     Test Value Reference Range Date Time    Amphetamine Screen        Barbiturate Screen        Benzodiazepine Screen        Methadone Screen        Phencyclidine Screen        Opiates Screen        THC Screen        Cocaine Screen        Propoxyphene Screen        Buprenorphine Screen        Methamphetamine Screen        Oxycodone Screen        Tricyclic Antidepressants Screen              Other (Risk screening)     Test Value Reference Range Date Time    Varicella IgG + history   10/26/18     Parvovirus IgG        CMV IgG        Cystic Fibrosis declines   10/26/18     Hemoglobin electrophoresis        NIPT declines   10/26/18     MSAFP-4        AFP (for NTD only) *Screen Negative*   11/19/18 1541              External Prenatal Results     Pregnancy  Outside Results - Transcribed From Office Records - See Scanned Records For Details     Test Value Date Time    Hgb 11.6 g/dL 19 0838    Hct 33.6 % 19 0838    ABO A  10/04/18 09    Rh Positive  10/04/18 0905    Antibody Screen Positive  10/04/18 09    Glucose Fasting GTT 71 mg/dL 19 0922    Glucose Tolerance Test 1 hour 142 mg/dL 19 0922    Glucose Tolerance Test 3 hour 126 mg/dL 19 0922    Gonorrhea (discrete) negative  10/01/18     Chlamydia (discrete) negative  10/01/18     RPR Non Reactive  10/01/18 1141    VDRL       Syphilis Antibody       Rubella 2.91 index 10/01/18 1141    HBsAg Negative  10/01/18 1141    Herpes Simplex Virus PCR       Herpes Simplex VIrus Culture       HIV Non Reactive  10/01/18 1141    Hep C RNA Quant PCR       Hep C Antibody <0.1 s/co ratio 10/01/18 1141    AFP 23.1 ng/mL 18 1541    Group B Strep Negative  04/10/19 1128    GBS Susceptibility to Clindamycin       GBS Susceptibility to Erythromycin       Fetal Fibronectin       Genetic Testing, Maternal Blood             Drug Screening     Test Value Date Time    Urine Drug Screen       Amphetamine Screen       Barbiturate Screen       Benzodiazepine Screen       Methadone Screen       Phencyclidine Screen       Opiates Screen       THC Screen       Cocaine Screen       Propoxyphene Screen       Buprenorphine Screen       Methamphetamine Screen       Oxycodone Screen       Tricyclic Antidepressants Screen                    Past OB History:     Obstetric History       T2      L2     SAB0   TAB0   Ectopic0   Molar0   Multiple0   Live Births2       # Outcome Date GA Lbr Ruddy/2nd Weight Sex Delivery Anes PTL Lv   3 Current            2 Term  37w0d  2268 g (5 lb) F CS-LTranv  N PHILIP      Complications: Preeclampsia   1 Term  37w0d  2410 g (5 lb 5 oz) M CS-LTranv  N PHILIP      Complications: Preeclampsia          Past Medical History: Past Medical History:   Diagnosis Date   • Allergic  rhinitis    • Anxiety    • Asthma    • Chlamydia    • Community acquired pneumonia     2016   • Depression     postpartum depression with both   • Mild dehydration    • Palpitations    • PVC (premature ventricular contraction)    • Sinus tachycardia    • Streptococcal pharyngitis     strep A      Past Surgical History Past Surgical History:   Procedure Laterality Date   •  SECTION      x2      Family History: Family History   Problem Relation Age of Onset   • Hypertension Mother    • Clotting disorder Paternal Grandfather    • Breast cancer Other    • Myocarditis Maternal Grandfather       Social History:  reports that she has never smoked. She has never used smokeless tobacco.   reports that she does not drink alcohol.   reports that she does not use drugs.        General ROS: Pertinent items are noted in HPI    Objective       Vital Signs Range for the last 24 hours  Temperature: Temp:  [97.9 °F (36.6 °C)] 97.9 °F (36.6 °C)   Temp Source: Temp src: Oral   BP:     Pulse:     Respirations: Resp:  [18] 18   SPO2:     O2 Amount (l/min):     O2 Devices     Weight: Weight:  [99.8 kg (220 lb)] 99.8 kg (220 lb)     Physical Examination: General appearance - alert, well appearing, and in no distress  Mental status - alert, oriented to person, place, and time  Chest - clear to auscultation, no wheezes, rales or rhonchi, symmetric air entry  Heart - normal rate and regular rhythm  Abdomen - soft, nontender,gravid  Neurological - alert, oriented, normal speech, no focal findings or movement disorder noted  Extremities - bilateral lower ext with trace edema, 2+ DTR's  Skin - normal coloration and turgor    Presentation: def   Cervix: Exam by:     Dilation:     Effacement:     Station:         Fetal Heart Rate Assessment   Reactive                              Uterine Assessment   Irregular ctx    Laboratory Results:   Lab Results   Component Value Date    WBC 10.37 2019    HGB 11.6 (L) 2019    HCT 33.6  (L) 2019    MCV 82.8 2019     2019       Assessment/Plan       Maternal atypical antibody complicating pregnancy in third trimester    Previous  delivery affecting pregnancy    Pregnancy    Hx of preeclampsia, prior pregnancy, currently pregnant    History of postpartum depression        Assessment:  1.  Intrauterine pregnancy at 38w3d gestation with reactive fetal status.    2.  Presents for repeat  delivery today  3.  Obstetrical history significant for .  Patient Active Problem List   Diagnosis   • Previous  delivery affecting pregnancy   • Maternal atypical antibody complicating pregnancy   • Mild persistent asthma without complication   • Asthma   • Palpitations   • H/O  section   • Pregnancy   • Elevated glucose   • Hx of preeclampsia, prior pregnancy, currently pregnant   • Antepartum anemia   • Maternal atypical antibody complicating pregnancy in third trimester   • History of postpartum depression     4.  GBS status:   Strep Gp B SALOME   Date Value Ref Range Status   04/10/2019 Negative Negative Final     Comment:     Centers for Disease Control and Prevention (CDC) and American Congress  of Obstetricians and Gynecologists (ACOG) guidelines for prevention of   group B streptococcal (GBS) disease specify co-collection of  a vaginal and rectal swab specimen to maximize sensitivity of GBS  detection. Per the CDC and ACOG, swabbing both the lower vagina and  rectum substantially increases the yield of detection compared with  sampling the vagina alone.  Penicillin G, ampicillin, or cefazolin are indicated for intrapartum  prophylaxis of  GBS colonization. Reflex susceptibility  testing should be performed prior to use of clindamycin only on GBS  isolates from penicillin-allergic women who are considered a high risk  for anaphylaxis. Treatment with vancomycin without additional testing  is warranted if resistance to clindamycin is noted.          Plan:  1. Proceed with   2. Plan of care has been reviewed with patient and partner  3.  Risks, benefits of treatment plan have been discussed.  4.  All questions have been answered.        Angeline Patel MD  2019  9:31 AM

## 2019-05-01 NOTE — ANESTHESIA POSTPROCEDURE EVALUATION
"Patient: Jana Nix    Procedure Summary     Date:  19 Room / Location:   [facility]    Anesthesia Start:  1051 Anesthesia Stop:  1222    Procedure:   SECTION REPEAT (N/A Abdomen) Diagnosis:       Maternal atypical antibody affecting pregnancy in third trimester, single or unspecified fetus      (Maternal atypical antibody affecting pregnancy in third trimester, single or unspecified fetus [O36.1930])    Surgeon:  Angeline Patel MD Provider:  Tian Carlson MD    Anesthesia Type:  spinal ASA Status:  2          Anesthesia Type: spinal  Last vitals  BP   121/75 (19)   Temp   36.3 °C (97.3 °F) (19)   Pulse   78 (19)   Resp   16 (19)     SpO2   99 % (19)     Post Anesthesia Care and Evaluation    Patient location during evaluation: PACU  Patient participation: complete - patient participated  Level of consciousness: awake  Pain management: adequate  Airway patency: patent  Anesthetic complications: No anesthetic complications    Cardiovascular status: acceptable  Respiratory status: acceptable  Hydration status: acceptable    Comments: /75 (BP Location: Right arm, Patient Position: Lying)   Pulse 78   Temp 36.3 °C (97.3 °F) (Oral)   Resp 16   Ht 160 cm (63\")   Wt 99.8 kg (220 lb)   LMP 2018 (Exact Date)   SpO2 99%   Breastfeeding? Yes   BMI 38.97 kg/m²         "

## 2019-05-01 NOTE — ANESTHESIA PROCEDURE NOTES
Spinal Block      Patient location during procedure: OR  Start Time: 5/1/2019 10:58 AM  Stop Time: 5/1/2019 11:00 AM  Indication:procedure for pain  Performed By  Anesthesiologist: Tian Carlson MD  CRNA: Deepti Chatterjee CRNA  Preanesthetic Checklist  Completed: patient identified, site marked, surgical consent, pre-op evaluation, timeout performed, IV checked, risks and benefits discussed and monitors and equipment checked  Spinal Block Prep:  Patient Position:sitting  Sterile Tech:cap, gloves, mask and sterile barriers  Prep:Chloraprep  Patient Monitoring:blood pressure monitoring, continuous pulse oximetry and EKG  Spinal Block Procedure  Approach:midline  Guidance:landmark technique and palpation technique  Location:L4-L5  Placement of Spinal needle event:cerebrospinal fluid aspirated  Paresthesia: no  Fluid Appearance:clear  Medications: fentaNYL citrate (PF) (SUBLIMAZE) injection, 10 mcg  Morphine PF injection, 200 mcg  Med Administered at 5/1/2019 11:00 AM   Post Assessment  Patient Tolerance:patient tolerated the procedure well with no apparent complications  Complications no

## 2019-05-01 NOTE — LACTATION NOTE
Assisted mom with waking and latching baby. Baby latched well with audible swallows. Educated mom on importance of deep latching and ways to achieve it. Encouraged mom Bf every 2-3 hours, pumping after most feeds to supplement baby with pumped milk. Reviewed hand pump use and cleaning. Encouraged to call if needing further assistance.    Lactation Consult Note    Evaluation Completed: 2019 5:23 PM  Patient Name: Jana Nix  :  1995  MRN:  [id number]     REFERRAL  INFORMATION:                          Date of Referral: 19   Person Making Referral: patient          DELIVERY HISTORY:          Skin to skin initiation date/time:        Skin to skin end date/time:              MATERNAL ASSESSMENT:     Breast Shape: round (19 : Justina Roblero RN)  Breast Density: soft (19 : Justina Roblero RN)  Areola: elastic (19 : Justina Roblero RN)  Nipples: everted (19 : Justina Roblero RN)                INFANT ASSESSMENT:  Information for the patient's :  Beau Nix [[id number]]   No past medical history on file.                                                                                                                                MATERNAL INFANT FEEDING:     Maternal Emotional State: relaxed (19 : Justina Roblero RN)  Infant Positioning: clutch/football (19 : Justina Roblero RN)   Signs of Milk Transfer: infant jaw motion present, audible swallow (19 : Justina Roblero RN)  Pain with Feeding: no (19 : Justina Roblero RN)                       Latch Assistance: yes (19 : Justina Roblero RN)                               EQUIPMENT TYPE:  Breast Pump Type: manual (19 : Justina Roblero RN)  Breast Pump Flange Type: hard (19 : Justina Roblero RN)  Breast Pump Flange Size: 24 mm (19 : Annika  Justina LORENZ RN)                        BREAST PUMPING:  Breast Pumping Interventions: frequent pumping encouraged (05/01/19 1700 : Justina Roblero RN)       LACTATION REFERRALS:

## 2019-05-01 NOTE — PLAN OF CARE
Problem: Patient Care Overview  Goal: Plan of Care Review  Outcome: Ongoing (interventions implemented as appropriate)   19 1240   Coping/Psychosocial   Plan of Care Reviewed With patient;significant other   Plan of Care Review   Progress improving   OTHER   Outcome Summary Pt delivered by R C/S at 1127. Mom resting comfortably.     Goal: Individualization and Mutuality  Outcome: Ongoing (interventions implemented as appropriate)   19 1240   Individualization   Patient Specific Preferences FOB at bedside, DAMIR if possible in recovery, BF   Patient Specific Goals (Include Timeframe) spinal for pain control   Patient Specific Interventions Family with pt, baby with mom as much as possible   Mutuality/Individual Preferences   What Anxieties, Fears, Concerns, or Questions Do You Have About Your Care? none   What Information Would Help Us Give You More Personalized Care? n/a   How Would You and/or Your Support Person Like to Participate in Your Care? n/a   Mutuality/Individual Preferences   How to Address Anxieties/Fears n/a     Goal: Discharge Needs Assessment  Outcome: Ongoing (interventions implemented as appropriate)   19 1240   Discharge Needs Assessment   Readmission Within the Last 30 Days no previous admission in last 30 days   Concerns to be Addressed no discharge needs identified   Patient/Family Anticipates Transition to home with family   Patient/Family Anticipated Services at Transition none   Transportation Concerns car, none   Transportation Anticipated car, drives self   Anticipated Changes Related to Illness none   Equipment Needed After Discharge none   Offered/Gave Vendor List no   Disability   Equipment Currently Used at Home none     Goal: Interprofessional Rounds/Family Conf  Outcome: Ongoing (interventions implemented as appropriate)   19 1240   Interdisciplinary Rounds/Family Conf   Participants family;nursing;patient;physician       Problem:  Delivery  (Adult,Obstetrics,Pediatric)  Goal: Signs and Symptoms of Listed Potential Problems Will be Absent, Minimized or Managed ( Delivery)  Outcome: Outcome(s) achieved Date Met: 19 1240   Goal/Outcome Evaluation   Problems Assessed ( Delivery) all   Problems Present ( Delivery) none       Problem: Fall Risk,  (Adult,Obstetrics,Pediatric)  Goal: Identify Related Risk Factors and Signs and Symptoms  Outcome: Ongoing (interventions implemented as appropriate)   19 1240   Fall Risk,  (Adult,Obstetrics,Pediatric)   Related Risk Factors (Fall Risk, ) hypotension;medication side effects;prolonged bed rest;regional anesthesia;unable to visualize feet;significant blood loss   Signs and Symptoms (Fall Risk, ) presence of fall risk factors     Goal: Absence of Maternal Fall  Outcome: Outcome(s) achieved Date Met: 19 1240   Fall Risk,  (Adult,Obstetrics,Pediatric)   Absence of Maternal Fall achieves outcome     Goal: Absence of  Fall/Drop  Outcome: Ongoing (interventions implemented as appropriate)   19 1240   Fall Risk,  (Adult,Obstetrics,Pediatric)   Absence of  Fall/Drop achieves outcome       Problem: Anesthesia/Analgesia, Neuraxial (Obstetrics)  Goal: Signs and Symptoms of Listed Potential Problems Will be Absent, Minimized or Managed (Anesthesia/Analgesia, Neuraxial)  Outcome: Ongoing (interventions implemented as appropriate)   19 1240   Goal/Outcome Evaluation   Problems Assessed (Neuraxial Anesthesia/Analgesia, OB) all   Problems Present (Neuraxial Anesth OB) none       Problem: Postpartum ( Delivery) (Adult,Obstetrics,Pediatric)  Goal: Signs and Symptoms of Listed Potential Problems Will be Absent, Minimized or Managed (Postpartum)  Outcome: Ongoing (interventions implemented as appropriate)   19 1240   Goal/Outcome Evaluation   Problems Assessed (Postpartum )  all   Problems Present (Postpartum ) none     Goal: Anesthesia/Sedation Recovery  Outcome: Ongoing (interventions implemented as appropriate)   19 1240   Goal/Outcome Evaluation   Anesthesia/Sedation Recovery criteria met for transfer       Problem: Skin Injury Risk (Adult)  Goal: Identify Related Risk Factors and Signs and Symptoms  Outcome: Ongoing (interventions implemented as appropriate)   19 1240   Skin Injury Risk (Adult)   Related Risk Factors (Skin Injury Risk) mobility impaired;moisture;tissue perfusion altered;medication     Goal: Skin Health and Integrity  Outcome: Ongoing (interventions implemented as appropriate)   19 1240   Skin Injury Risk (Adult)   Skin Health and Integrity making progress toward outcome

## 2019-05-01 NOTE — PROGRESS NOTES
[facility]   PROGRESS NOTE    Post-Op Day 0 S/P   Subjective     Patient reports:  Pain is currently well-controlled with percocet and motrin. She denies any current palpitations but notes she is aware of recent increased heart rate. She denies chest pain and reports light lochia.     ROS:  Neuro: neg for lightheadedness  CV: pos for increased heart rate, neg for chest pain    Objective      Vitals: Vital Signs Range for the last 24 hours  Temperature: Temp:  [96.7 °F (35.9 °C)-98.4 °F (36.9 °C)] 98.4 °F (36.9 °C)   Temp Source: Temp src: Oral   BP: BP: (110-150)/(63-83) 111/64   Pulse: Heart Rate:  [] 102   Respirations: Resp:  [16-20] 16   SPO2: SpO2:  [94 %-99 %] 95 %   O2 Amount (l/min):     O2 Devices Device (Oxygen Therapy): room air   Weight: Weight:  [99.8 kg (220 lb)] 99.8 kg (220 lb)            Physical Exam Gen: pt in no distress, sitting up comfortably   Lungs  heart clear to auscultation bilaterally   Mildly tachycardic with regular rhythm   Abdomen Soft, no distension or tenderness; fundus firm at U-1   Incision  Bandage intact/dry   Extremities SCD's in place bilaterally     labs:  Lab Results   Component Value Date    WBC 14.69 (H) 2019    HGB 10.7 (L) 2019    HCT 31.8 (L) 2019    MCV 83.0 2019     2019     Assessment/Plan        Maternal atypical antibody complicating pregnancy in third trimester    Previous  delivery affecting pregnancy    Palpitations    Pregnancy    Hx of preeclampsia, prior pregnancy, currently pregnant    History of postpartum depression     delivery delivered      Assessment:    Jana Nix is Day 0  post-partum  , Low Transverse : contacted by RN due to increased heart rate. Patient is aware of increased heart rate but denies chest pain or palpitations currently. Urine output adequate at approx 100 cc per hour. CBC done and shows mild drop in hgb but not unexpected; pt has history of  intermittent tachycardia during pregnancy. She was evaluated by cardiology and no issues found. She was given rx for symptomatic treatment with verapamil. She did not take medication as her heart rate varied significantly and pt wanted to avoid side effects and low heart rate.   Plan:  Plan routine care currently        Angeline Patel MD  5/1/2019  7:10 PM

## 2019-05-01 NOTE — ANESTHESIA PREPROCEDURE EVALUATION
Anesthesia Evaluation     Patient summary reviewed and Nursing notes reviewed                Airway   Mallampati: II  TM distance: >3 FB  Neck ROM: full  Dental - normal exam     Pulmonary - normal exam    breath sounds clear to auscultation  (+) pneumonia resolved , asthma,   Cardiovascular - negative cardio ROS and normal exam    Rhythm: regular  Rate: normal    (-) angina, orthopnea, PND, BEAN      Neuro/Psych  (+) psychiatric history Anxiety and Depression,     GI/Hepatic/Renal/Endo    (+) obesity,       Musculoskeletal (-) negative ROS    Abdominal    Substance History - negative use     OB/GYN    (+) Pregnant,         Other - negative ROS                       Anesthesia Plan    ASA 2     spinal     Anesthetic plan, all risks, benefits, and alternatives have been provided, discussed and informed consent has been obtained with: patient.

## 2019-05-02 LAB
BASOPHILS # BLD AUTO: 0.03 10*3/MM3 (ref 0–0.2)
BASOPHILS NFR BLD AUTO: 0.3 % (ref 0–1.5)
DEPRECATED RDW RBC AUTO: 42.7 FL (ref 37–54)
EOSINOPHIL # BLD AUTO: 0.51 10*3/MM3 (ref 0–0.4)
EOSINOPHIL NFR BLD AUTO: 5.1 % (ref 0.3–6.2)
ERYTHROCYTE [DISTWIDTH] IN BLOOD BY AUTOMATED COUNT: 13.8 % (ref 12.3–15.4)
HCT VFR BLD AUTO: 29.2 % (ref 34–46.6)
HGB BLD-MCNC: 9.7 G/DL (ref 12–15.9)
IMM GRANULOCYTES # BLD AUTO: 0.1 10*3/MM3 (ref 0–0.05)
IMM GRANULOCYTES NFR BLD AUTO: 1 % (ref 0–0.5)
LYMPHOCYTES # BLD AUTO: 2.03 10*3/MM3 (ref 0.7–3.1)
LYMPHOCYTES NFR BLD AUTO: 20.2 % (ref 19.6–45.3)
MCH RBC QN AUTO: 28.5 PG (ref 26.6–33)
MCHC RBC AUTO-ENTMCNC: 33.2 G/DL (ref 31.5–35.7)
MCV RBC AUTO: 85.9 FL (ref 79–97)
MONOCYTES # BLD AUTO: 0.91 10*3/MM3 (ref 0.1–0.9)
MONOCYTES NFR BLD AUTO: 9.1 % (ref 5–12)
NEUTROPHILS # BLD AUTO: 6.46 10*3/MM3 (ref 1.7–7)
NEUTROPHILS NFR BLD AUTO: 64.3 % (ref 42.7–76)
NRBC BLD AUTO-RTO: 0 /100 WBC (ref 0–0.2)
PLATELET # BLD AUTO: 155 10*3/MM3 (ref 140–450)
PMV BLD AUTO: 11.4 FL (ref 6–12)
RBC # BLD AUTO: 3.4 10*6/MM3 (ref 3.77–5.28)
WBC NRBC COR # BLD: 10.04 10*3/MM3 (ref 3.4–10.8)

## 2019-05-02 PROCEDURE — 85025 COMPLETE CBC W/AUTO DIFF WBC: CPT | Performed by: OBSTETRICS & GYNECOLOGY

## 2019-05-02 PROCEDURE — 99024 POSTOP FOLLOW-UP VISIT: CPT | Performed by: OBSTETRICS & GYNECOLOGY

## 2019-05-02 RX ADMIN — SIMETHICONE CHEW TAB 80 MG 80 MG: 80 TABLET ORAL at 18:08

## 2019-05-02 RX ADMIN — OXYCODONE HYDROCHLORIDE AND ACETAMINOPHEN 2 TABLET: 5; 325 TABLET ORAL at 14:16

## 2019-05-02 RX ADMIN — DOCUSATE SODIUM 100 MG: 100 CAPSULE, LIQUID FILLED ORAL at 21:02

## 2019-05-02 RX ADMIN — OXYCODONE HYDROCHLORIDE AND ACETAMINOPHEN 2 TABLET: 5; 325 TABLET ORAL at 18:08

## 2019-05-02 RX ADMIN — SIMETHICONE CHEW TAB 80 MG 80 MG: 80 TABLET ORAL at 09:34

## 2019-05-02 RX ADMIN — OXYCODONE HYDROCHLORIDE AND ACETAMINOPHEN 2 TABLET: 5; 325 TABLET ORAL at 04:40

## 2019-05-02 RX ADMIN — IBUPROFEN 600 MG: 600 TABLET ORAL at 09:34

## 2019-05-02 RX ADMIN — Medication: at 09:34

## 2019-05-02 RX ADMIN — DOCUSATE SODIUM 100 MG: 100 CAPSULE, LIQUID FILLED ORAL at 09:34

## 2019-05-02 RX ADMIN — OXYCODONE HYDROCHLORIDE AND ACETAMINOPHEN 2 TABLET: 5; 325 TABLET ORAL at 21:54

## 2019-05-02 RX ADMIN — IBUPROFEN 600 MG: 600 TABLET ORAL at 17:18

## 2019-05-02 RX ADMIN — SODIUM CHLORIDE, POTASSIUM CHLORIDE, SODIUM LACTATE AND CALCIUM CHLORIDE 250 ML: 600; 310; 30; 20 INJECTION, SOLUTION INTRAVENOUS at 05:11

## 2019-05-02 RX ADMIN — OXYCODONE HYDROCHLORIDE AND ACETAMINOPHEN 2 TABLET: 5; 325 TABLET ORAL at 09:34

## 2019-05-02 NOTE — LACTATION NOTE
Lactation Consult Note  Mom called to assist with latching as baby has been sleepy today. He has a short frenulum but latches without difficulty and Mom denies discomfort. He needed frequent stimulation to keep nursing and nursed better on right side with audible swallows. Encouraged pumping every 3 hrs, feeding all ebm to baby, if he was sleepy and not latching well. She has her Spectra pump here.  Evaluation Completed: 2019 6:51 PM  Patient Name: Jana Nix  :  1995  MRN:  [id number]     REFERRAL  INFORMATION:                          Date of Referral: 19   Person Making Referral: patient  Maternal Reason for Referral: breastfeeding unsuccessful in past, breastfeeding currently       DELIVERY HISTORY:          Skin to skin initiation date/time:        Skin to skin end date/time:              MATERNAL ASSESSMENT:     Breast Shape: Bilateral:, pendulous (19 : Justina Rivera RN)  Breast Density: Bilateral:, soft (19 : Justina Rivera RN)  Areola: Bilateral:, elastic (19 : Justina Rivera RN)  Nipples: Bilateral:, everted (19 : Justina Rivera RN)                INFANT ASSESSMENT:  Information for the patient's :  Beau Nix [[id number]]   No past medical history on file.    Feeding Readiness Cues: quiet (19 : Justina Rivera RN)   Feeding Method: breastfeeding (19 : Justina Rivera RN)   Feeding Tolerance/Success: arousal required, coordinated suck, coordinated swallow (19 : Justina Rivera RN)                   Feeding Interventions: latch assistance provided (19 : Justina Rivera RN)       Additional Documentation: LATCH Score (Group) (19 : Justina Rivera RN)           Breastfeeding: breastfeeding, bilateral (19 : Justina Rivera RN)   Infant Positioning: clutch/football (19 : Justina Rivera  RN)       Breastfeeding Time, Right (min): 5 (19 : Justina Rivera RN)   Effective Latch During Feeding: yes (19 : Justina Rivera RN)   Suck/Swallow Coordination: present (19 : Justina Rivera RN)   Signs of Milk Transfer: audible swallow, infant jaw motion present, suck/swallow ratio (19 : Justina Rivera RN)       Latch: 2-->grasps breast, tongue down, lips flanged, rhythmic sucking (19 : Justina Rivera RN)   Audible Swallowin-->spontaneous and intermittent (24 hrs old) (19 : Justina Rivera RN)   Type of Nipple: 2-->everted (after stimulation) (19 : Justina Rivera RN)   Comfort (Breast/Nipple): 2-->soft/nontender (19 : Justina Rivera RN)   Hold (Positioning): 1-->minimal assist, teach one side, mother does other, staff holds (19 : Justina Rivera RN)   Latch Score: 9 (19 : Justina Rivera RN)     Infant-Driven Feeding Scales - Readiness: Alert once handled. Some rooting or takes pacifier. Adequate tone. (19 : Justina Rivera RN)   Infant-Driven Feeding Scales - Quality: Nipples with a strong coordinated SSB but fatigues with progression. (19 : Justina Rivera RN)             MATERNAL INFANT FEEDING:  Maternal Preparation: breast care (19 : Justina Rivera RN)  Maternal Emotional State: assist needed (19 : Justina Rivera RN)  Infant Positioning: clutch/football (19 : Nicole, Justina L, RN)   Signs of Milk Transfer: audible swallow, infant jaw motion present, suck/swallow ratio (19 : Justina Rivera RN)  Pain with Feeding: no (19 : Justina Rivera RN)           Milk Ejection Reflex: present (19 : Justina Rivera RN)           Latch Assistance: yes (19 : Justnia Rivera RN)                                EQUIPMENT TYPE:  Breast Pump Type: double electric, personal (05/02/19 9500 : Justina Rivera RN)                              BREAST PUMPING:          LACTATION REFERRALS:

## 2019-05-02 NOTE — PLAN OF CARE
Problem: Patient Care Overview  Goal: Plan of Care Review  Outcome: Ongoing (interventions implemented as appropriate)   05/02/19 1929   Coping/Psychosocial   Plan of Care Reviewed With patient   Plan of Care Review   Progress improving       Problem: Breastfeeding (Adult,Obstetrics,Pediatric)  Goal: Signs and Symptoms of Listed Potential Problems Will be Absent, Minimized or Managed (Breastfeeding)  Outcome: Ongoing (interventions implemented as appropriate)   05/02/19 1929   Goal/Outcome Evaluation   Problems Assessed (Breastfeeding) all   Problems Present (Breastfeeding) none

## 2019-05-02 NOTE — LACTATION NOTE
P3. Patient expresses concern that baby is not latching correctly because she feels discomfort. Baby has nursed several times and transferred milk well. Nipples are pink and intact and baby has had numerous stools and wets. Reassured patient that baby is nursing well and enc her to call  for help as needed.   Lactation Consult Note    Evaluation Completed: 2019 11:23 AM  Patient Name: Jana Nix  :  1995  MRN:  [id number]     REFERRAL  INFORMATION:                          Date of Referral: 19   Person Making Referral: nurse  Maternal Reason for Referral: breastfeeding currently       DELIVERY HISTORY:          Skin to skin initiation date/time:        Skin to skin end date/time:              MATERNAL ASSESSMENT:     Breast Shape: round (19 1100 : Nguyen Damon RN)  Breast Density: soft (19 1100 : Nguyen Damon RN)  Areola: elastic (19 1100 : Ngyuen Damon, RN)  Nipples: everted (19 1100 : Nguyen Damon RN)                INFANT ASSESSMENT:  Information for the patient's :  Beau Nix [[id number]]   No past medical history on file.                                                                                                                                MATERNAL INFANT FEEDING:     Maternal Emotional State: anxious, assist needed (19 1100 : Nguyen Damon, RN)                                                                 EQUIPMENT TYPE:  Breast Pump Type: manual, other (see comments)(patient states she has not used the pump yet) (19 1100 : Nguyen Damon, RN)                              BREAST PUMPING:          LACTATION REFERRALS:  Lactation Referrals: outpatient lactation program (19 1100 : Nguyen Damon, RN)

## 2019-05-03 PROCEDURE — 99253 IP/OBS CNSLTJ NEW/EST LOW 45: CPT | Performed by: NURSE PRACTITIONER

## 2019-05-03 RX ADMIN — OXYCODONE AND ACETAMINOPHEN 1 TABLET: 5; 325 TABLET ORAL at 22:29

## 2019-05-03 RX ADMIN — OXYCODONE HYDROCHLORIDE AND ACETAMINOPHEN 2 TABLET: 5; 325 TABLET ORAL at 09:28

## 2019-05-03 RX ADMIN — IBUPROFEN 600 MG: 600 TABLET ORAL at 03:29

## 2019-05-03 RX ADMIN — IBUPROFEN 600 MG: 600 TABLET ORAL at 20:15

## 2019-05-03 RX ADMIN — OXYCODONE AND ACETAMINOPHEN 1 TABLET: 5; 325 TABLET ORAL at 17:47

## 2019-05-03 RX ADMIN — OXYCODONE HYDROCHLORIDE AND ACETAMINOPHEN 2 TABLET: 5; 325 TABLET ORAL at 13:36

## 2019-05-03 RX ADMIN — DOCUSATE SODIUM 100 MG: 100 CAPSULE, LIQUID FILLED ORAL at 09:28

## 2019-05-03 RX ADMIN — DOCUSATE SODIUM 100 MG: 100 CAPSULE, LIQUID FILLED ORAL at 20:15

## 2019-05-03 RX ADMIN — IBUPROFEN 600 MG: 600 TABLET ORAL at 11:35

## 2019-05-03 RX ADMIN — OXYCODONE HYDROCHLORIDE AND ACETAMINOPHEN 2 TABLET: 5; 325 TABLET ORAL at 03:29

## 2019-05-03 NOTE — PROGRESS NOTES
"  5/3/2019    Name:Jana Nix    MR#:[id number]     Progress Note:  Post-Op 2 S/P    HD:2    Subjective   24 y.o. yo Female  s/p CS at 38w3d doing okay. Pain well controlled, intermittently is a \"10/10\" but is controlled with medication. She complains that she continues to have abnormal sensation in her feet after her . When she walks, she has a hard time feeling where her feet are and feels unsteady. She denies any true numbness in her feet still. Does have some swelling, the same as prior to delivery  Tolerating regular diet and having flatus. Lochia normal.     Patient Active Problem List   Diagnosis   • Previous  delivery affecting pregnancy   • Maternal atypical antibody complicating pregnancy   • Mild persistent asthma without complication   • Asthma   • Palpitations   • H/O  section   • Pregnancy   • Elevated glucose   • Hx of preeclampsia, prior pregnancy, currently pregnant   • Antepartum anemia   • Maternal atypical antibody complicating pregnancy in third trimester   • History of postpartum depression   •  delivery delivered        Objective    Vitals  Temp:  Temp:  [98 °F (36.7 °C)-98.1 °F (36.7 °C)] 98 °F (36.7 °C)  Temp src: Oral  BP:  BP: (105-109)/(70-72) 109/72  Pulse:  Heart Rate:  [] 96  RR:   Resp:  [18-20] 18  Weight: 99.8 kg (220 lb)  BMI:  Body mass index is 38.97 kg/m².      General Awake, alert, no distress  Abdomen Soft, non-distended, fundus firm, 2 cm below umbilicus, appropriately tender  Incision  Intact, no erythema or exudate  Extremities Calves NT bilaterally, trace peripheral edema BL LE to lower shins  Neuro  Normal gait but relies on surrounding items to steady herself. Normal DTR bilateral patella, normal extension and flexion against resistance BL LE, normal sensation to sharp touch BL LE including bottom of feet      I/O last 3 completed shifts:  In: 720 [P.O.:720]  Out: 2150 [Urine:2150]    LABS:   Lab Results "   Component Value Date    WBC 10.04 2019    HGB 9.7 (L) 2019    HCT 29.2 (L) 2019    MCV 85.9 2019     2019       Infant: male       Assessment  1.  POD 2 CS  2. Abnormal sensation with gait. Discussed with anesthesia and is not a usual complication from the spinal. My neuro exam is wnl. Plan to consult neurology for any further recommendations.  3. Anemia- continue prenatal iron and after a few BM oral iron    Desires circumcision. Discussed is cosmetic. Risks of bleeding, damage to penis, infection and signs of infection, aftercare discussed. Desires to proceed.      Maternal atypical antibody complicating pregnancy in third trimester    Previous  delivery affecting pregnancy    Palpitations    Pregnancy    Hx of preeclampsia, prior pregnancy, currently pregnant    History of postpartum depression     delivery delivered      Sigrid Londono MD  5/3/2019 11:16 AM

## 2019-05-03 NOTE — CONSULTS
"DOS: 5/3/2019  NAME: Jana Nix   : 1995  PCP: Braeden Rider MD    Chief Complaint   Patient presents with   • Scheduled      Isoimmunization (anti-C antibody)      SUBJECTIVE  Neurological Problem:  24 y.o. female with no previous medical history except for pre-eclampsia with a prior pregnancy who is POD#2 s/p  with continued abnormal sensation of bilateral feet since her delivery for which neurology was conuslted. History is provided by patient, spouse and review of records.     HPI:   Patient states that the soles of both feet have not felt normal since her delivery. She characterizes the sensation as \"feeling like an extra layer\" at the bottom of her feet. She did not notice the abnormal sensation until she got up to ambulate after her  for which she received a spinal block. She does report that it feels more back to normal than this morning, but she still has an abnormal sensation. The sensation is localized to the soles of both feet. She denies any pain, paresthesias or weakness. She denies any previous similar episodes. She had a spinal block with a previous delivery with no complications.     Review of Systems:Review of Systems   Constitutional: Negative.  Negative for fever.   HENT: Negative.    Eyes: Negative.    Respiratory: Negative.    Cardiovascular: Positive for leg swelling.   Gastrointestinal: Negative.    Endocrine: Negative.    Genitourinary:        Post-op pain   Musculoskeletal: Negative.    Skin: Negative.    Allergic/Immunologic: Negative.    Neurological: Positive for numbness.   Hematological: Negative.    Psychiatric/Behavioral: Negative.     Above ROS reviewed    Current Medications:   Current Facility-Administered Medications:   •  diphenhydrAMINE (BENADRYL) capsule 25 mg, 25 mg, Oral, Q4H PRN **OR** diphenhydrAMINE (BENADRYL) injection 25 mg, 25 mg, Intravenous, Q4H PRN, 25 mg at 19 1310 **OR** diphenhydrAMINE (BENADRYL) injection 25 mg, " 25 mg, Intramuscular, Q4H PRN, Deepti Chatterjee CRNA  •  docusate sodium (COLACE) capsule 100 mg, 100 mg, Oral, BID, Angeline Patel MD, 100 mg at 05/03/19 0928  •  ibuprofen (ADVIL,MOTRIN) tablet 600 mg, 600 mg, Oral, Q8H PRN, Angeline Patel MD, 600 mg at 05/03/19 1135  •  lanolin cream, , Topical, Q1H PRN, Angeline Patel MD  •  magnesium hydroxide (MILK OF MAGNESIA) suspension 2400 mg/10mL 10 mL, 10 mL, Oral, Daily PRN, Angeline Patel MD  •  methylergonovine (METHERGINE) injection 200 mcg, 200 mcg, Intramuscular, Once PRN, Angeline Patel MD  •  misoprostol (CYTOTEC) tablet 600 mcg, 600 mcg, Rectal, Once PRN, Angeline Patel MD  •  naloxone (NARCAN) injection 0.2 mg, 0.2 mg, Intravenous, Q15 Min PRN, Deepti Chatterjee CRNA  •  ondansetron (ZOFRAN) injection 4 mg, 4 mg, Intravenous, Once PRN, Deepti Chatterjee CRNA  •  ondansetron (ZOFRAN) injection 4 mg, 4 mg, Intravenous, Q6H PRN, Angeline Patel MD  •  oxyCODONE-acetaminophen (PERCOCET) 5-325 MG per tablet 1 tablet, 1 tablet, Oral, Q4H PRN, Angeline Patel MD, 1 tablet at 05/01/19 2159  •  oxyCODONE-acetaminophen (PERCOCET) 5-325 MG per tablet 2 tablet, 2 tablet, Oral, Q4H PRN, Angeline Patel MD, 2 tablet at 05/03/19 1336  •  simethicone (MYLICON) chewable tablet 80 mg, 80 mg, Oral, 4x Daily PRN, Angeline Patel MD, 80 mg at 05/02/19 1808    The following portions of the patient's history were reviewed and updated as appropriate: allergies, current medications, past family history, past medical history, past social history, past surgical history and problem list.    OBJECTIVE  Vitals:    05/03/19 1111   BP: 110/75   Pulse: 90   Resp: 18   Temp: 97.9 °F (36.6 °C)   SpO2:        Diagnostics:    Laboratory Results:         Lab Results   Component Value Date    WBC 10.04 05/02/2019    HGB 9.7 (L) 05/02/2019    HCT 29.2 (L) 05/02/2019    MCV 85.9 05/02/2019     05/02/2019     Lab Results   Component Value  Date    GLUCOSE 105 (H) 12/12/2018    BUN 8 12/12/2018    CREATININE 0.51 (L) 12/12/2018    EGFRIFNONA 149 12/12/2018    EGFRIFAFRI 118 06/26/2018    BCR 15.7 12/12/2018    K 3.3 (L) 12/12/2018    CO2 22.1 12/12/2018    CALCIUM 9.1 12/12/2018    PROTENTOTREF 6.6 06/26/2018    ALBUMIN 3.50 12/12/2018    LABIL2 2.0 06/26/2018    AST 31 12/12/2018    ALT 44 (H) 12/12/2018     Lab Results   Component Value Date    HGBA1C 5.00 06/26/2018     Lab Results   Component Value Date    RPR Non Reactive 10/01/2018     Lab Results   Component Value Date    TSH 1.190 12/12/2018     No results found for: [id number]    Physical Examination:   General Appearance:   Well developed, well nourished, well groomed, alert, and cooperative.  HEENT: Normocephalic.    Neck and Spine: Normal range of motion.  Normal alignment.   Cardiac: Regular rate and rhythm.   Peripheral Vasculature: No signs of distal embolization.  Extremities:    Mild BLE edema. Normal joint ROM.  Skin:    No rashes or birth marks.    Neurological examination:  Higher Integrative  Function: Oriented to time, place and person. Normal language including comprehension, spontaneous speech and vocabulary. No neglect.  Normal fund of knowledge and higher integrative function.  CN II: Pupils are equal, round Normal visual acuity and visual fields.    CN III IV VI: Extraocular movements are full without nystagmus.   CN V: Normal facial sensation and strength of muscles of mastication.  CN VII: Facial movements are symmetric. No weakness.  CN VIII:   Auditory acuity is normal.  CN IX & X:   Symmetric palatal movement.  CN XI: Sternocleidomastoid and trapezius are normal.  No weakness.  CN XII:   The tongue is midline.    Motor: Normal muscle strength, bulk and tone in upper and lower extremities.  No fasciculations, rigidity, spasticity, or abnormal movements.  Reflexes: 2+ BLE reflexes, plantars down-going   Sensation: Normal to light touch, pinprick, vibration, temperature to  testing in bilateral lower extremities.  Mild sway on Romberg testing.   Station and Gait: Slow, steady gait.     Coordination: Heel to shin normal.    Impression/Plan:  Ms. Nix is a 25 yo WF who is POD#2  delivery w/ spinal block with abnormal sensation in soles of feet bilaterally. Sensory exam of bilateral lower extremities is essentially normal with only mild sway on Romberg testing. Recent spinal block in combination with BLE swelling likely etiology of symptoms. She reports improvement in symptoms this afternoon from this morning which is reassuring. Would expect complete resolution in the next day or so. Recommend supportive care, no further w/u indicated at this time. This was discussed with patient and spouse. We will sign off and see again upon request. Please call with questions or concerns.       The above plan was discussed with Dr. Marzena RN, patient and spouse.   Coding      Dictated using Dragon

## 2019-05-04 RX ADMIN — IBUPROFEN 600 MG: 600 TABLET ORAL at 22:30

## 2019-05-04 RX ADMIN — OXYCODONE AND ACETAMINOPHEN 1 TABLET: 5; 325 TABLET ORAL at 22:31

## 2019-05-04 RX ADMIN — DOCUSATE SODIUM 100 MG: 100 CAPSULE, LIQUID FILLED ORAL at 22:31

## 2019-05-04 RX ADMIN — SIMETHICONE CHEW TAB 80 MG 80 MG: 80 TABLET ORAL at 22:31

## 2019-05-04 RX ADMIN — DOCUSATE SODIUM 100 MG: 100 CAPSULE, LIQUID FILLED ORAL at 08:00

## 2019-05-04 RX ADMIN — IBUPROFEN 600 MG: 600 TABLET ORAL at 04:26

## 2019-05-04 RX ADMIN — IBUPROFEN 600 MG: 600 TABLET ORAL at 13:57

## 2019-05-04 RX ADMIN — DOCUSATE SODIUM 100 MG: 100 CAPSULE, LIQUID FILLED ORAL at 13:57

## 2019-05-04 RX ADMIN — OXYCODONE HYDROCHLORIDE AND ACETAMINOPHEN 2 TABLET: 5; 325 TABLET ORAL at 07:58

## 2019-05-04 RX ADMIN — OXYCODONE AND ACETAMINOPHEN 1 TABLET: 5; 325 TABLET ORAL at 04:26

## 2019-05-04 RX ADMIN — OXYCODONE AND ACETAMINOPHEN 1 TABLET: 5; 325 TABLET ORAL at 13:57

## 2019-05-04 NOTE — PROGRESS NOTES
" POSTPARTUM ROUNDS    SUBJECTIVE:    CC:  POD 3 from CS    Subjective:  Pt is doing well, pain is ok, still very swollen but numbness is resolving. pt is ambulating and tolerating a regular diet.  Voiding well. No nausea.    Review of Systems - Negative except pain with movement and LE swelling- has improved  NO FEVER OR CHILLS , NO NAUSEA OR VOMITING, NO LEG PAIN    OBJECTIVE:  Vital Signs: /75 (BP Location: Left arm, Patient Position: Lying)   Pulse 80   Temp 98.1 °F (36.7 °C) (Oral)   Resp 18   Ht 160 cm (63\")   Wt 99.8 kg (220 lb)   LMP 2018 (Exact Date)   SpO2 95%   Breastfeeding? Yes   BMI 38.97 kg/m²     Intake/Output Summary (Last 24 hours) at 2019 0940  Last data filed at 5/3/2019 1111  Gross per 24 hour   Intake 240 ml   Output 300 ml   Net -60 ml       Constitutional: The patient is well nourished. Alert and oriented.  Mental status is upbeat, no signs postpartum depression.   Cardiovascular:RRR  Resp: nonlabored breathing  The incision is  clean, dry and Intact   Gastrointestinal: fundus firm below umbilicus  Extremities: 1+ edema, non-tender bilateral    LABS / IMAGING:  Lab Results (last 24 hours)     ** No results found for the last 24 hours. **          ASSESSMENT AND PLAN:    POD 3 from  delivery:    Patient Active Problem List   Diagnosis   • Previous  delivery affecting pregnancy   • Maternal atypical antibody complicating pregnancy   • Mild persistent asthma without complication   • Asthma   • Palpitations   • H/O  section   • Pregnancy   • Elevated glucose   • Hx of preeclampsia, prior pregnancy, currently pregnant   • Antepartum anemia   • Maternal atypical antibody complicating pregnancy in third trimester   • History of postpartum depression   •  delivery delivered       Patient is postop day 3 from LTCS  Patient is doing well   Encourage ambulation   Home tomorrow    Patricia Kaur MD    2019  9:40 AM      "

## 2019-05-05 VITALS
HEART RATE: 85 BPM | OXYGEN SATURATION: 95 % | SYSTOLIC BLOOD PRESSURE: 121 MMHG | WEIGHT: 220 LBS | HEIGHT: 63 IN | BODY MASS INDEX: 38.98 KG/M2 | RESPIRATION RATE: 18 BRPM | DIASTOLIC BLOOD PRESSURE: 81 MMHG | TEMPERATURE: 97.7 F

## 2019-05-05 LAB
ABO + RH BLD: NORMAL
ABO + RH BLD: NORMAL
BH BB BLOOD EXPIRATION DATE: NORMAL
BH BB BLOOD EXPIRATION DATE: NORMAL
BH BB BLOOD TYPE BARCODE: 5100
BH BB BLOOD TYPE BARCODE: 5100
BH BB DISPENSE STATUS: NORMAL
BH BB DISPENSE STATUS: NORMAL
BH BB PRODUCT CODE: NORMAL
BH BB PRODUCT CODE: NORMAL
BH BB UNIT NUMBER: NORMAL
BH BB UNIT NUMBER: NORMAL
CROSSMATCH INTERPRETATION: NORMAL
CROSSMATCH INTERPRETATION: NORMAL
UNIT  ABO: NORMAL
UNIT  ABO: NORMAL
UNIT  RH: NORMAL
UNIT  RH: NORMAL

## 2019-05-05 RX ORDER — IBUPROFEN 600 MG/1
600 TABLET ORAL EVERY 6 HOURS PRN
Qty: 30 TABLET | Refills: 1 | Status: SHIPPED | OUTPATIENT
Start: 2019-05-05 | End: 2019-05-05

## 2019-05-05 RX ORDER — OXYCODONE HYDROCHLORIDE AND ACETAMINOPHEN 5; 325 MG/1; MG/1
2 TABLET ORAL EVERY 4 HOURS PRN
Qty: 30 TABLET | Refills: 0 | Status: SHIPPED | OUTPATIENT
Start: 2019-05-05 | End: 2019-05-11

## 2019-05-05 RX ORDER — IBUPROFEN 600 MG/1
600 TABLET ORAL EVERY 6 HOURS PRN
Qty: 30 TABLET | Refills: 1 | Status: SHIPPED | OUTPATIENT
Start: 2019-05-05 | End: 2019-08-06

## 2019-05-05 RX ORDER — OXYCODONE HYDROCHLORIDE AND ACETAMINOPHEN 5; 325 MG/1; MG/1
2 TABLET ORAL EVERY 4 HOURS PRN
Qty: 30 TABLET | Refills: 0 | Status: SHIPPED | OUTPATIENT
Start: 2019-05-05 | End: 2019-05-05

## 2019-05-05 RX ADMIN — DOCUSATE SODIUM 100 MG: 100 CAPSULE, LIQUID FILLED ORAL at 09:49

## 2019-05-05 RX ADMIN — OXYCODONE HYDROCHLORIDE AND ACETAMINOPHEN 2 TABLET: 5; 325 TABLET ORAL at 09:49

## 2019-05-05 RX ADMIN — IBUPROFEN 600 MG: 600 TABLET ORAL at 09:49

## 2019-05-05 RX ADMIN — OXYCODONE HYDROCHLORIDE AND ACETAMINOPHEN 2 TABLET: 5; 325 TABLET ORAL at 04:29

## 2019-05-05 NOTE — DISCHARGE SUMMARY
section Discharge Summary    Date of Admission: 2019  Date of Discharge:  2019      Patient: Jana Nix      MR#:[id number]    Surgeon/OB: Angeline Patel MD    Discharge Diagnosis:  section at 38w3d, uncomplicated recovery    Procedures:  , Low Transverse     2019    11:27 AM      Anesthesia:  Spinal     Presenting Problem/History of Present Illness  Maternal atypical antibody affecting pregnancy in third trimester, single or unspecified fetus [O36.1930]  Pregnancy [Z34.90]     Patient Active Problem List   Diagnosis   • Previous  delivery affecting pregnancy   • Maternal atypical antibody complicating pregnancy   • Mild persistent asthma without complication   • Asthma   • Palpitations   • H/O  section   • Pregnancy   • Elevated glucose   • Hx of preeclampsia, prior pregnancy, currently pregnant   • Antepartum anemia   • Maternal atypical antibody complicating pregnancy in third trimester   • History of postpartum depression   •  delivery delivered       Hospital Course  Patient is a 24 y.o. female  at 38w3d status post  section with uneventful postoperative recovery.  Patient was advanced to regular diet on postoperative day#1.  Patient was seen by neurology on POD#2 for numbness in feet.  Symptoms improved.  On discharge, ambulating, tolerating a regular diet without any difficulties and her incision is dry, clean and intact.     Infant:   male  fetus 3970 g (8 lb 12 oz)  with Apgar scores of 9  , 9   at five minutes.    Condition on Discharge:  Stable    Vital Signs  Temp:  [97.7 °F (36.5 °C)-98.1 °F (36.7 °C)] 97.7 °F (36.5 °C)  Heart Rate:  [85-97] 85  Resp:  [16-18] 18  BP: (109-121)/(69-81) 121/81    Lab Results   Component Value Date    WBC 10.04 2019    HGB 9.7 (L) 2019    HCT 29.2 (L) 2019    MCV 85.9 2019     2019       Discharge Disposition  Home or Self Care    Discharge  Medications     Your medication list      START taking these medications      Instructions Last Dose Given Next Dose Due   ibuprofen 600 MG tablet  Commonly known as:  ADVIL,MOTRIN      Take 1 tablet by mouth Every 6 (Six) Hours As Needed for Mild Pain .       oxyCODONE-acetaminophen 5-325 MG per tablet  Commonly known as:  PERCOCET      Take 2 tablets by mouth Every 4 (Four) Hours As Needed for Moderate Pain  or Severe Pain  for up to 6 days.          CONTINUE taking these medications      Instructions Last Dose Given Next Dose Due   aspirin 81 MG chewable tablet      Chew 81 mg Daily.       CITRANATAL ASSURE 35-1 & 300 MG tablet      Take 1 tablet and 1 soft gel by mouth Daily.       Fluticasone Furoate-Vilanterol 100-25 MCG/INH inhaler  Commonly known as:  BREO ELLIPTA      Inhale 1 puff by mouth once daily (rinse mouth and gargle after use)       BREO ELLIPTA 100-25 MCG/INH inhaler  Generic drug:  Fluticasone Furoate-Vilanterol      Inhale 1 puff Daily.       loratadine 10 MG tablet  Commonly known as:  CLARITIN      Take 10 mg by mouth.       PROAIR RESPICLICK 108 (90 Base) MCG/ACT inhaler  Generic drug:  albuterol      Inhale 2 puffs Every 4 (Four)- 6(Six) Hours As Needed.       verapamil 40 MG tablet  Commonly known as:  CALAN      Take 1 tablet by mouth 2 (Two) Times a Day.             Where to Get Your Medications      These medications were sent to [facility] - [place] [id number]    Hours:  7:00AM-6PM Mon-Fri Phone:  [phone]   · ibuprofen 600 MG tablet  · oxyCODONE-acetaminophen 5-325 MG per tablet           Discharge Diet: Regular    Follow-up Appointments  Future Appointments   Date Time Provider Department Center   5/10/2019 10:15 AM Angeline Patel MD [facility] None     Additional Instructions for the Follow-ups that You Need to Schedule     Call MD for problems / concerns.   As directed      Instructions: Call for temp>101, vaginal bleeding greater than  one pad/hour, severe pain or other concerns.    Order Comments:  Instructions: Call for temp>101, vaginal bleeding greater than one pad/hour, severe pain or other concerns.          Discharge Follow-up with Specialty: Dr. byrd; 1 Week   As directed      Specialty:  Dr. byrd    Follow Up:  1 Week                   Justina Angel MD  5/5/2019  11:43 AM

## 2019-05-05 NOTE — PROGRESS NOTES
2019    Name:Jana Nix    MR#:[id number]     Progress Note:  Post-Op 4 S/P    HD:4    Subjective   24 y.o. yo Female  s/p CS at 38w3d doing well. Pain well controlled. Tolerating regular diet and having flatus. Lochia normal.     Patient Active Problem List   Diagnosis   • Previous  delivery affecting pregnancy   • Maternal atypical antibody complicating pregnancy   • Mild persistent asthma without complication   • Asthma   • Palpitations   • H/O  section   • Pregnancy   • Elevated glucose   • Hx of preeclampsia, prior pregnancy, currently pregnant   • Antepartum anemia   • Maternal atypical antibody complicating pregnancy in third trimester   • History of postpartum depression   •  delivery delivered        Objective    Vitals  Temp:  Temp:  [97.7 °F (36.5 °C)-98.1 °F (36.7 °C)] 97.7 °F (36.5 °C)  Temp src: Oral  BP:  BP: (109-121)/(69-81) 121/81  Pulse:  Heart Rate:  [85-97] 85  RR:   Resp:  [16-18] 18  Weight: 99.8 kg (220 lb)  BMI:  Body mass index is 38.97 kg/m².    General Awake, alert, no distress  Abdomen Soft, non-distended, fundus firm, below umbilicus, appropriately tender  Incision  Intact, no erythema or exudate  Extremities Calves NT bilaterally     No intake/output data recorded.    LABS:   Lab Results   Component Value Date    WBC 10.04 2019    HGB 9.7 (L) 2019    HCT 29.2 (L) 2019    MCV 85.9 2019     2019       Infant: male       Assessment    1.  POD 4    Plan: Doing well.  Discharge home      Maternal atypical antibody complicating pregnancy in third trimester    Previous  delivery affecting pregnancy    Palpitations    Pregnancy    Hx of preeclampsia, prior pregnancy, currently pregnant    History of postpartum depression     delivery delivered      Jusitna Angel MD  2019 11:18 AM

## 2019-05-05 NOTE — PLAN OF CARE
Problem: Patient Care Overview  Goal: Plan of Care Review  Outcome: Ongoing (interventions implemented as appropriate)   19 0357   Coping/Psychosocial   Plan of Care Reviewed With patient   Plan of Care Review   Progress improving   OTHER   Outcome Summary Stable, pain controlled with po pain meds, voiding without any problems, breastfeeding well, plans on d/c in am     Goal: Individualization and Mutuality  Outcome: Ongoing (interventions implemented as appropriate)    Goal: Discharge Needs Assessment  Outcome: Ongoing (interventions implemented as appropriate)    Goal: Interprofessional Rounds/Family Conf  Outcome: Ongoing (interventions implemented as appropriate)      Problem: Postpartum ( Delivery) (Adult,Obstetrics,Pediatric)  Goal: Signs and Symptoms of Listed Potential Problems Will be Absent, Minimized or Managed (Postpartum)  Outcome: Ongoing (interventions implemented as appropriate)    Goal: Anesthesia/Sedation Recovery  Outcome: Ongoing (interventions implemented as appropriate)      Problem: Breastfeeding (Adult,Obstetrics,Pediatric)  Goal: Signs and Symptoms of Listed Potential Problems Will be Absent, Minimized or Managed (Breastfeeding)  Outcome: Ongoing (interventions implemented as appropriate)

## 2019-05-10 ENCOUNTER — POSTPARTUM VISIT (OUTPATIENT)
Dept: OBSTETRICS AND GYNECOLOGY | Age: 24
End: 2019-05-10

## 2019-05-10 ENCOUNTER — TELEPHONE (OUTPATIENT)
Dept: OBSTETRICS AND GYNECOLOGY | Age: 24
End: 2019-05-10

## 2019-05-10 VITALS
BODY MASS INDEX: 34.34 KG/M2 | WEIGHT: 193.8 LBS | DIASTOLIC BLOOD PRESSURE: 70 MMHG | SYSTOLIC BLOOD PRESSURE: 118 MMHG | HEIGHT: 63 IN

## 2019-05-10 DIAGNOSIS — Z09 POSTOP CHECK: ICD-10-CM

## 2019-05-10 PROBLEM — Z34.90 PREGNANCY: Status: RESOLVED | Noted: 2019-01-13 | Resolved: 2019-05-10

## 2019-05-10 PROBLEM — O34.219 PREVIOUS CESAREAN DELIVERY AFFECTING PREGNANCY: Status: RESOLVED | Noted: 2018-10-01 | Resolved: 2019-05-10

## 2019-05-10 PROCEDURE — 0503F POSTPARTUM CARE VISIT: CPT | Performed by: OBSTETRICS & GYNECOLOGY

## 2019-05-10 NOTE — PROGRESS NOTES
"Chief complaint:postop incision check    HPI  Jana Nix is a 24 y.o. female presents for routine incision check. She reports scant lochia.         The following portions of the patient's history were reviewed and updated as appropriate: allergies, current medications, past family history, past medical history, past social history, past surgical history and problem list.    Review of Systems  Constitutional: negative  Gastrointestinal: neg for n/v  Genitourinary:neg for heavy bleeding  Behavioral/Psych: negative for depression    /70   Ht 160 cm (63\")   Wt 87.9 kg (193 lb 12.8 oz)   LMP 2018 (Exact Date)   Breastfeeding? Yes   BMI 34.33 kg/m²         Physical Exam   Constitutional: She is oriented to person, place, and time. She appears well-developed and well-nourished.   Abdominal: Soft. She exhibits no distension. There is no tenderness. There is no guarding.   Incision clean/dry/intact; without erythema or induration   Musculoskeletal:   Bilateral lower ext without cords or tenderness, no edema   Neurological: She is oriented to person, place, and time.   Skin: Skin is dry.   Psychiatric: She has a normal mood and affect. Her behavior is normal. Judgment and thought content normal.           Jana was seen today for postpartum care.    Diagnoses and all orders for this visit:     delivery delivered    Postop check      Pt doing well postperatively. She denies depression issues. She reports some mild mood changes but does not want treatment. She agrees to call if things worsen.   Discussed contraception. Recommended pt avoid future pregnancies given 3  sections and scar tissue noted. Pt voices understanding and will consider.   F/u 3 weeks or prn          "

## 2019-05-23 ENCOUNTER — TELEPHONE (OUTPATIENT)
Dept: OBSTETRICS AND GYNECOLOGY | Age: 24
End: 2019-05-23

## 2019-05-23 NOTE — TELEPHONE ENCOUNTER
Spoke to patient.  She is having right breast redness, tenderness, fever and chills.  Baby did not nurse yesterday as well and she did have a clog in that area.  She thinks she got the clog out and is pumping as well.  Offered appointment but she would like to start antibiotics and monitor.  She will call with any increasing symptoms or concerns.  Will send rx to pharmacy on file.

## 2019-05-23 NOTE — TELEPHONE ENCOUNTER
Dr Patel pt has a red spot on lower right breast that is sore and she has a fever 100.2. Last night her fever was 100.5. Pt believes it may be mastitis and she is really in pain. Please Advise

## 2019-05-24 ENCOUNTER — TELEPHONE (OUTPATIENT)
Dept: OBSTETRICS AND GYNECOLOGY | Age: 24
End: 2019-05-24

## 2019-05-24 ENCOUNTER — OFFICE VISIT (OUTPATIENT)
Dept: OBSTETRICS AND GYNECOLOGY | Age: 24
End: 2019-05-24

## 2019-05-24 VITALS
HEIGHT: 63 IN | WEIGHT: 187 LBS | SYSTOLIC BLOOD PRESSURE: 126 MMHG | BODY MASS INDEX: 33.13 KG/M2 | DIASTOLIC BLOOD PRESSURE: 80 MMHG

## 2019-05-24 DIAGNOSIS — N61.0 MASTITIS: Primary | ICD-10-CM

## 2019-05-24 PROCEDURE — 99213 OFFICE O/P EST LOW 20 MIN: CPT | Performed by: NURSE PRACTITIONER

## 2019-05-24 NOTE — TELEPHONE ENCOUNTER
I called patient and asked her how soon she can get here, she said 30 minutes. Added to Josseline's schedule.

## 2019-05-24 NOTE — PROGRESS NOTES
"Subjective   Jana Nix is a 24 y.o. female is being seen today for   Chief Complaint   Patient presents with   • Breast Problem     Pt states right breast is lumpy, red and painful.   .    History of Present Illness     Patient called yesterday with symptoms of mastitis.  She is 2.5 weeks postpartum and is breastfeeding  She complained of pain in breast, red area, fever and overall just not feeling well  She was started on Dicloxacillin yesterday  She no longer has a fever and feels much better but was concerned that she still had a red area and hard spots on that breast  She is breastfeeding and pumping and has tried to massage the area  She has not seen a lactation consultant since being discharged from the hospital      The following portions of the patient's history were reviewed and updated as appropriate: allergies, current medications, past family history, past medical history, past social history, past surgical history and problem list.    /80   Ht 160 cm (63\")   Wt 84.8 kg (187 lb)   Breastfeeding? Yes   BMI 33.13 kg/m²         Review of Systems   Constitutional: Negative.  Negative for fever.   HENT: Negative.    Eyes: Negative.    Respiratory: Negative.         Right breast pain   Cardiovascular: Negative.    Gastrointestinal: Negative.    Endocrine: Negative.    Genitourinary: Negative.    Musculoskeletal: Negative.  Negative for arthralgias.   Skin: Negative.    Allergic/Immunologic: Negative.    Neurological: Negative.    Hematological: Negative.    Psychiatric/Behavioral: Negative.        Objective   Physical Exam   Constitutional: She is oriented to person, place, and time. She appears well-developed and well-nourished.   Pulmonary/Chest: Effort normal. Right breast exhibits skin change. Right breast exhibits no inverted nipple, no nipple discharge and no tenderness. Left breast exhibits no inverted nipple, no mass, no nipple discharge, no skin change and no tenderness.   Slight " redness noted on inner right breast  Small hardened areas noted as well  + tenderness       Abdominal: Soft.   Genitourinary: No breast discharge.   Neurological: She is alert and oriented to person, place, and time.   Skin: Skin is warm and dry.   Psychiatric: She has a normal mood and affect. Her behavior is normal.         Assessment/Plan   Jana was seen today for breast problem.    Diagnoses and all orders for this visit:    Mastitis      History and exam consistent with mastitis.  Patient has been on antibiotics for 24 hours with overall systemic symptoms resolving.  Discussed massage and heat to the breast and to make sure to fully empty breast every 3-4 hours.  Lactation number for hospital given to patient as well for follow up. Continue current antibiotics and if fever returns >101 or she has worsening symptoms she was instructed to call this weekend or go to urgent care for evaluation.  If lumps do not resolve with antibiotics she should be seen again and potentially have a breast ultrasound.

## 2019-05-24 NOTE — TELEPHONE ENCOUNTER
(Amanda pt)Pt states she was prescribed antibiotics for mastitis yesterday. Pt's right breast is feeling lumpy, tender, and red as it was yesterday and her fever has went away. Pt doesn't know if it has gotten worse from then but is concerned with the weekend coming up. Pt has only had one dose of medicine. Pt denies drainage or cracking. Please advise.

## 2019-06-03 ENCOUNTER — POSTPARTUM VISIT (OUTPATIENT)
Dept: OBSTETRICS AND GYNECOLOGY | Age: 24
End: 2019-06-03

## 2019-06-03 VITALS
SYSTOLIC BLOOD PRESSURE: 120 MMHG | WEIGHT: 186 LBS | HEIGHT: 63 IN | BODY MASS INDEX: 32.96 KG/M2 | DIASTOLIC BLOOD PRESSURE: 70 MMHG

## 2019-06-03 PROBLEM — O09.299 HX OF PREECLAMPSIA, PRIOR PREGNANCY, CURRENTLY PREGNANT: Status: RESOLVED | Noted: 2019-03-05 | Resolved: 2019-06-03

## 2019-06-03 PROBLEM — O36.1990 MATERNAL ATYPICAL ANTIBODY COMPLICATING PREGNANCY: Status: RESOLVED | Noted: 2018-10-03 | Resolved: 2019-06-03

## 2019-06-03 PROBLEM — O36.1930 MATERNAL ATYPICAL ANTIBODY COMPLICATING PREGNANCY IN THIRD TRIMESTER: Status: RESOLVED | Noted: 2019-04-17 | Resolved: 2019-06-03

## 2019-06-03 PROBLEM — O99.019 ANTEPARTUM ANEMIA: Status: RESOLVED | Noted: 2019-03-29 | Resolved: 2019-06-03

## 2019-06-03 PROCEDURE — 0503F POSTPARTUM CARE VISIT: CPT | Performed by: OBSTETRICS & GYNECOLOGY

## 2019-06-03 NOTE — PROGRESS NOTES
"Subjective   Jana Nix is a 24 y.o. female who presents for a postpartum visit. She is 6 weeks postpartum following a low cervical transverse  section. I have fully reviewed the prenatal and intrapartum course. The delivery was at 38 gestational weeks. Outcome: repeat  section, low transverse incision. Anesthesia: spinal. Postpartum course has been complicated by mastitis which has resolved with antibiotic treatment. Baby's course has been normal. Baby is feeding by breast. Bleeding staining only. Bowel function is normal. Bladder function is normal. Patient is not sexually active. Contraception method is IUD. Postpartum depression screening: positive. Pt reports mild mood issues and she is interested in treatment. She denies suicidal or homicidal ideation    The following portions of the patient's history were reviewed and updated as appropriate: allergies, current medications, past family history, past medical history, past social history, past surgical history and problem list.    Review of Systems  Pertinent items are noted in HPI.    Objective   /70   Ht 160 cm (63\")   Wt 84.4 kg (186 lb)   Breastfeeding? Yes   BMI 32.95 kg/m²    General:  alert, appears stated age, cooperative and no distress    Breasts:  bilateral breasts examined and no erythem or focal tenderness   Lungs: clear to auscultation bilaterally   Heart:  regular rate and rhythm   Abdomen: soft, non-tender; bowel sounds normal; no masses,  no organomegaly and incision healed without signs of infection    Vulva:  normal   Vagina: normal vagina, no discharge, exudate, lesion, or erythema   Cervix:  no lesions   Corpus: normal size, contour, position, consistency, mobility, non-tender   Adnexa:  normal adnexa and no mass, fullness, tenderness   Rectal Exam: Not performed.     Assessment/Plan   Normal postpartum exam. Pap smear not done at today's visit as pt had negative pap 10/18  Depression: pt notes mild symptoms " but desires treatment. She denies any history of bipolar disease. Has been on anti-depressants in past but cannot recall what she took for postpartum symptoms in past    1. Contraception: IUD--pt desires Kyleena after review of options; reviewed risks to include infection, imbedding/perforation with surgery to remove, failure to prevent pregnancy and expulsion  2. rx zoloft and reviewed risks and benefits; pt to call if symptoms do not improve or worsen; recommend 1/2 tablet for first week  3. Follow up in: 3 weeks for IUD placement and f/u or as needed.

## 2019-06-24 ENCOUNTER — PROCEDURE VISIT (OUTPATIENT)
Dept: OBSTETRICS AND GYNECOLOGY | Age: 24
End: 2019-06-24

## 2019-06-24 ENCOUNTER — POSTPARTUM VISIT (OUTPATIENT)
Dept: OBSTETRICS AND GYNECOLOGY | Age: 24
End: 2019-06-24

## 2019-06-24 VITALS
DIASTOLIC BLOOD PRESSURE: 68 MMHG | HEIGHT: 63 IN | BODY MASS INDEX: 33.38 KG/M2 | WEIGHT: 188.4 LBS | SYSTOLIC BLOOD PRESSURE: 110 MMHG

## 2019-06-24 DIAGNOSIS — Z01.812 PRE-PROCEDURE LAB EXAM: ICD-10-CM

## 2019-06-24 DIAGNOSIS — Z30.430 ENCOUNTER FOR IUD INSERTION: Primary | ICD-10-CM

## 2019-06-24 DIAGNOSIS — Z30.431 IUD CHECK UP: Primary | ICD-10-CM

## 2019-06-24 PROCEDURE — 81025 URINE PREGNANCY TEST: CPT | Performed by: OBSTETRICS & GYNECOLOGY

## 2019-06-24 PROCEDURE — 76830 TRANSVAGINAL US NON-OB: CPT | Performed by: OBSTETRICS & GYNECOLOGY

## 2019-06-24 PROCEDURE — 58300 INSERT INTRAUTERINE DEVICE: CPT | Performed by: OBSTETRICS & GYNECOLOGY

## 2019-06-24 NOTE — PROGRESS NOTES
IUD Insertion Procedure Note    Pre-operative Diagnosis: Kyleena IUD desired    Post-operative Diagnosis: same    Indications: contraception    Procedure Details   Urine pregnancy test was done today and result was negative. Pt denies any unprotected intercourse.  The risks (including infection, bleeding, pain, and uterine perforation) and benefits of the procedure were explained to the patient and verbal and written informed consent was obtained.      Cervix cleansed with Betadine X 3. Uterus sounded to 8.5 cm. IUD inserted without difficulty using sterile technique. String visible and trimmed at 2 cm.    IUD Information:  Kyleena, Lot # [id number], Expiration date April 2021.    Condition:  Stable    Complications:  None  Patient tolerated the procedure well without complications.    Plan:    The patient was advised to call for any fever or for prolonged or severe pain or bleeding. Advised she abstain from intercourse for 2 weeks then advised condom use until IUD check 6 weeks  tv u/s done following and IUD in uterine cavity in appropriate position

## 2019-08-06 ENCOUNTER — OFFICE VISIT (OUTPATIENT)
Dept: OBSTETRICS AND GYNECOLOGY | Age: 24
End: 2019-08-06

## 2019-08-06 VITALS
WEIGHT: 191 LBS | SYSTOLIC BLOOD PRESSURE: 130 MMHG | BODY MASS INDEX: 33.84 KG/M2 | HEIGHT: 63 IN | DIASTOLIC BLOOD PRESSURE: 80 MMHG

## 2019-08-06 DIAGNOSIS — Z30.431 IUD CHECK UP: Primary | ICD-10-CM

## 2019-08-06 PROBLEM — Z86.59 HISTORY OF POSTPARTUM DEPRESSION: Status: RESOLVED | Noted: 2019-05-01 | Resolved: 2019-08-06

## 2019-08-06 PROBLEM — Z87.59 HISTORY OF POSTPARTUM DEPRESSION: Status: RESOLVED | Noted: 2019-05-01 | Resolved: 2019-08-06

## 2019-08-06 PROBLEM — R73.09 ELEVATED GLUCOSE: Status: RESOLVED | Noted: 2019-01-25 | Resolved: 2019-08-06

## 2019-08-06 PROCEDURE — 99213 OFFICE O/P EST LOW 20 MIN: CPT | Performed by: OBSTETRICS & GYNECOLOGY

## 2019-08-06 NOTE — PROGRESS NOTES
"Chief complaint:IUD check    HPI  Jana Nix is a 24 y.o. female presents for IUD check. She reports light bleeding after placement for several weeks then stopped. Only scant brown discharge at times for now. Mood is stable with zoloft.         The following portions of the patient's history were reviewed and updated as appropriate: allergies, current medications, past family history, past medical history, past social history, past surgical history and problem list.    Review of Systems  Constitutional: negative  Gastrointestinal: negative  Genitourinary:pos for irregular light flow since IUD placement, neg for pelvic pain  Behavioral/Psych: neg for depressed mood since treatment    /80   Ht 160 cm (63\")   Wt 86.6 kg (191 lb)   Breastfeeding? No   BMI 33.83 kg/m²         Physical Exam   Constitutional: She is oriented to person, place, and time. She appears well-developed and well-nourished.   HENT:   Head: Normocephalic and atraumatic.   Pulmonary/Chest: Effort normal.   Abdominal: Soft.   Genitourinary:   Genitourinary Comments: Normal vagina with scant brown discharge, cervix normal and IUD strings noted   Neurological: She is alert and oriented to person, place, and time.   Skin: Skin is warm and dry.   Psychiatric: She has a normal mood and affect. Her behavior is normal. Judgment and thought content normal.           Jana was seen today for contraception.    Diagnoses and all orders for this visit:    IUD check up    Postpartum depression    Other orders  -     sertraline (ZOLOFT) 50 MG tablet; Take 0.5 tablets by mouth Daily. For first week then 1 tablet daily      Normal IUD check, plan f/u 6 months for annual    Will continue zoloft. She denies any current mood problems with treatment          "

## 2019-08-26 DIAGNOSIS — L03.211 FACIAL CELLULITIS: Primary | ICD-10-CM

## 2019-08-26 RX ORDER — CEPHALEXIN 250 MG/1
250 CAPSULE ORAL 4 TIMES DAILY
Qty: 40 CAPSULE | Refills: 0 | Status: SHIPPED | OUTPATIENT
Start: 2019-08-26 | End: 2019-09-05

## 2019-09-30 ENCOUNTER — TELEPHONE (OUTPATIENT)
Dept: OBSTETRICS AND GYNECOLOGY | Age: 24
End: 2019-09-30

## 2019-09-30 NOTE — TELEPHONE ENCOUNTER
She can try that but based on her sx's I suspect she is getting mastitis.  I would recommend that she start on the antibiotic as well.  C/w cool compresses and c/w BF in the meantime. Can also use tylenol prn fever/pain

## 2019-09-30 NOTE — TELEPHONE ENCOUNTER
Dr Starr pt nursing had a clogged milk duct yest  that pt stated did not get worked out til 1 am this morning, running low grade temp, right breast red tender, did exp aches/chills yest. Also exp an upper resp inf, drainage clear. Pt also is asking if she can do sun flower lesium (sp) please advise, pharm on file, nka.

## 2020-03-14 RX ORDER — ONDANSETRON 4 MG/1
4 TABLET, FILM COATED ORAL EVERY 8 HOURS PRN
Qty: 30 TABLET | Refills: 0 | Status: SHIPPED | OUTPATIENT
Start: 2020-03-14 | End: 2020-12-24

## 2020-03-14 RX ORDER — ONDANSETRON 4 MG/1
4 TABLET, FILM COATED ORAL EVERY 8 HOURS PRN
Qty: 30 TABLET | Refills: 0 | Status: SHIPPED | OUTPATIENT
Start: 2020-03-14 | End: 2020-03-14 | Stop reason: SDUPTHER

## 2020-03-20 RX ORDER — AZITHROMYCIN 250 MG/1
TABLET, FILM COATED ORAL
Qty: 6 TABLET | Refills: 0 | Status: SHIPPED | OUTPATIENT
Start: 2020-03-20 | End: 2020-12-24

## 2020-03-20 RX ORDER — METHYLPREDNISOLONE 4 MG/1
TABLET ORAL
Qty: 21 TABLET | Refills: 0 | Status: SHIPPED | OUTPATIENT
Start: 2020-03-20 | End: 2020-12-24

## 2020-11-20 NOTE — MR AVS SNAPSHOT
Jana Nix   1/20/2017 10:30 AM   Office Visit    Dept Phone:  [phone]   Encounter #:  [id number]    Provider:  Mk Lopez MD   Department:  [facility] CARDIOLOGY                Your Full Care Plan              Today's Medication Changes          These changes are accurate as of: 1/20/17 11:27 AM.  If you have any questions, ask your nurse or doctor.               Stop taking medication(s)listed here:     amoxicillin 500 MG capsule   Commonly known as:  AMOXIL   Stopped by:  Mk Lopez MD           levoFLOXacin 500 MG tablet   Commonly known as:  LEVAQUIN   Stopped by:  Mk Lopez MD                      Your Updated Medication List          This list is accurate as of: 1/20/17 11:27 AM.  Always use your most recent med list.                albuterol 108 (90 BASE) MCG/ACT inhaler   Commonly known as:  PROVENTIL HFA;VENTOLIN HFA   Inhale 2 puffs every 4 (four) hours as needed for wheezing.       budesonide-formoterol 160-4.5 MCG/ACT inhaler   Commonly known as:  SYMBICORT       buPROPion 100 MG tablet   Commonly known as:  WELLBUTRIN       norethindrone-ethinyl estradiol 1-20 MG-MCG per tablet   Commonly known as:  LOESTRIN 1/20 (21)   Take 1 tablet by mouth Daily.       Vortioxetine HBr 10 MG tablet               You Were Diagnosed With        Codes Comments    Palpitations    -  Primary ICD-10-CM: R00.2  ICD-9-CM: 785.1       Instructions     None    Patient Instructions History      Upcoming Appointments     Visit Type Date Time Department    NEW PATIENT 1/20/2017 10:30 AM [facility] E[facility] 2D COMPLETE VT 1/31/2017  1:00 PM  [facility] E[facility]    [facility] HOLTER MONITOR 48 HOUR VT 1/31/2017  2:00 PM [facility]    WELLNESS 3/14/2017  1:30 PM [facility]      IM-Senset Signup     [facility] allows you to send messages to your doctor, view your test results, renew your prescriptions, schedule appointments,  "and more. To sign up, go to [employer] and click on the Sign Up Now link in the New User? box. Enter your [employer] Activation Code exactly as it appears below along with the last four digits of your Social Security Number and your Date of Birth () to complete the sign-up process. If you do not sign up before the expiration date, you must request a new code.    [employer] Activation Code: [email or web address]  Expires: 2/3/2017 11:27 AM    If you have questions, you can email [email or web address] or call [phone] to talk to our [employer] staff. Remember, [employer] is NOT to be used for urgent needs. For medical emergencies, dial 911.               Other Info from Your Visit           Your Appointments     2017  1:00 PM EST   ECHOCARDIOGRAM 2D COMPLETE VISIT with [facility] OUTPATIENT ECHOCARDIOGRAPHY ([place])    [address] [place] [id number] [phone]           Bring your insurance cards with you. Wear comfortable clothing and shoes.            2017  2:00 PM EST   HOLTER MONITOR - 48 HOUR VISIT with [facility]TER   [facility] CARDIOLOGY ([facility])    [address] [place] [id number]               Mar 14, 2017  1:30 PM EDT   Wellness with Angeline Patel MD   [facility] OB GYN (--)    [address] [facility] [address] [phone]              Allergies     No Known Allergies      Reason for Visit     Irregular Heart Beat           Vital Signs     Blood Pressure Pulse Height Weight Body Mass Index Smoking Status    112/82 70 63\" (160 cm) 197 lb (89.4 kg) 34.9 kg/m2 Never Smoker      Problems and Diagnoses Noted     Palpitations    -  Primary        " Detail Level: Simple Instructions: This plan will send the code FBSD to the PM system.  DO NOT or CHANGE the price. Price (Do Not Change): 0.00

## 2020-12-23 ENCOUNTER — TELEPHONE (OUTPATIENT)
Dept: INTERNAL MEDICINE | Facility: CLINIC | Age: 25
End: 2020-12-23

## 2020-12-23 NOTE — TELEPHONE ENCOUNTER
PATIENT CALLING WANTING TO SPEAK WITH MARLIN VELÁSQUEZ REGARDING HER PRIOR AUTHORIZATION. HAS AN APPOINTMENT WITH DR. KRAMER TOMORROW AND WANTED TO KNOW IF THE PRIOR AUTHORIZATION WAS RECEIVED.       PATIENT REQUESTING CALLBACK [phone]

## 2020-12-24 ENCOUNTER — OFFICE VISIT (OUTPATIENT)
Dept: INTERNAL MEDICINE | Facility: CLINIC | Age: 25
End: 2020-12-24

## 2020-12-24 VITALS
HEART RATE: 83 BPM | SYSTOLIC BLOOD PRESSURE: 124 MMHG | TEMPERATURE: 98 F | BODY MASS INDEX: 34.41 KG/M2 | HEIGHT: 63 IN | DIASTOLIC BLOOD PRESSURE: 70 MMHG | OXYGEN SATURATION: 99 % | WEIGHT: 194.2 LBS

## 2020-12-24 DIAGNOSIS — Z00.00 HEALTHCARE MAINTENANCE: ICD-10-CM

## 2020-12-24 DIAGNOSIS — J45.998 ASTHMA, PERSISTENT NOT CONTROLLED: ICD-10-CM

## 2020-12-24 DIAGNOSIS — Z76.89 ENCOUNTER TO ESTABLISH CARE: Primary | ICD-10-CM

## 2020-12-24 PROBLEM — F32.A DEPRESSIVE DISORDER: Status: ACTIVE | Noted: 2020-12-24

## 2020-12-24 PROBLEM — I49.3 VENTRICULAR PREMATURE BEATS: Status: ACTIVE | Noted: 2017-10-25

## 2020-12-24 PROCEDURE — 99203 OFFICE O/P NEW LOW 30 MIN: CPT | Performed by: NURSE PRACTITIONER

## 2020-12-24 RX ORDER — ALBUTEROL SULFATE 90 UG/1
2 AEROSOL, METERED RESPIRATORY (INHALATION) EVERY 4 HOURS PRN
Qty: 8 G | Refills: 0 | Status: SHIPPED | OUTPATIENT
Start: 2020-12-24 | End: 2021-01-13

## 2020-12-24 RX ORDER — METHYLPREDNISOLONE 4 MG/1
TABLET ORAL
Qty: 21 TABLET | Refills: 0 | Status: SHIPPED | OUTPATIENT
Start: 2020-12-24 | End: 2021-06-11

## 2020-12-24 RX ORDER — ALBUTEROL SULFATE 90 UG/1
POWDER, METERED RESPIRATORY (INHALATION)
COMMUNITY
End: 2021-02-05 | Stop reason: SDUPTHER

## 2020-12-24 RX ORDER — MONTELUKAST SODIUM 10 MG/1
TABLET ORAL
Qty: 90 TABLET | Refills: 1 | Status: SHIPPED | OUTPATIENT
Start: 2020-12-24 | End: 2022-11-22 | Stop reason: SDUPTHER

## 2020-12-24 RX ORDER — DEXAMETHASONE 4 MG/1
1 TABLET ORAL
Qty: 12 G | Refills: 0 | Status: SHIPPED | OUTPATIENT
Start: 2020-12-24 | End: 2021-12-23

## 2020-12-24 NOTE — PROGRESS NOTES
Subjective   Jana Nix is a 25 y.o. female.   Chief Complaint   Patient presents with   • Establish Care   asthma attacks     Patient presents to establish care.  This is a 25-year-old female former patient of Dr. Rider.  This patient is new to me.    She has asthma, persistent.  This had been managed by her allergist, Dr. Abbasi unfortunately recently passed away.  Her pulmonologist is Dr. Rueda but she hasn't seen him in a couple of years.  She ran out of her Breo 1 month ago and over the past 2 to 3 weeks her asthma has been uncontrolled.  She endorses wheezing, shortness of breath multiple times throughout the day.  She is out of albuterol and needs a refill.  States that she has tried Symbicort, Dulera and Advair with little effect in the past and that Breo is the only one that has been effective.  She has also tried regular albuterol inhalers which have not been as effective as the albuterol ProAir Respiclick.  She has different insurance now which is requiring a prior authorization for the respite click and Breo.    Has not had a flu shot this season, would like to wait.    She follows with Dr. Patel, OB/GYN and has a Kyleena IUD.  She feels that she cannot lose weight with this IUD and states that she wants to discuss with her GYN removing the Kyleena and placing a nonhormonal option.    Never smoker.  Exercises daily.    Denies development of any other new issues today.       The following portions of the patient's history were reviewed and updated as appropriate: allergies, current medications, past family history, past medical history, past social history, past surgical history and problem list.    Review of Systems   Constitutional: Negative for activity change, chills, fatigue, fever, unexpected weight gain and unexpected weight loss.   HENT: Negative for congestion, hearing loss, postnasal drip, sinus pressure, sneezing, sore throat, swollen glands and tinnitus.    Eyes: Negative for  "photophobia, pain and visual disturbance.   Respiratory: Positive for chest tightness, shortness of breath and wheezing. Negative for cough.    Cardiovascular: Negative for chest pain, palpitations and leg swelling.   Gastrointestinal: Negative for abdominal distention, abdominal pain, constipation, diarrhea, nausea and vomiting.   Endocrine: Negative for polydipsia, polyphagia and polyuria.   Genitourinary: Negative for dysuria, frequency, hematuria and urgency.   Neurological: Negative for dizziness, weakness, numbness and headache.   All other systems reviewed and are negative.      Objective    /70 (BP Location: Left arm, Patient Position: Sitting, Cuff Size: Adult)   Pulse 83   Temp 98 °F (36.7 °C)   Ht 160 cm (63\")   Wt 88.1 kg (194 lb 3.2 oz)   SpO2 99%   BMI 34.40 kg/m²     Physical Exam  Vitals signs and nursing note reviewed.   Constitutional:       General: She is not in acute distress.     Appearance: Normal appearance. She is well-developed. She is not ill-appearing, toxic-appearing or diaphoretic.   HENT:      Head: Normocephalic and atraumatic.      Right Ear: Tympanic membrane, ear canal and external ear normal.      Left Ear: Tympanic membrane, ear canal and external ear normal.   Eyes:      Pupils: Pupils are equal, round, and reactive to light.   Neck:      Musculoskeletal: Normal range of motion and neck supple. No neck rigidity or muscular tenderness.      Vascular: No carotid bruit.   Cardiovascular:      Rate and Rhythm: Normal rate and regular rhythm.      Pulses: Normal pulses.      Heart sounds: Normal heart sounds.      Comments: No peripheral edema  Pulmonary:      Effort: Pulmonary effort is normal. No respiratory distress.      Breath sounds: No stridor. Wheezing present. No rhonchi or rales.   Chest:      Chest wall: No tenderness.   Abdominal:      General: Bowel sounds are normal. There is no distension.      Palpations: Abdomen is soft.      Tenderness: There is no " abdominal tenderness.   Musculoskeletal: Normal range of motion.   Lymphadenopathy:      Cervical: No cervical adenopathy.   Skin:     General: Skin is warm and dry.      Capillary Refill: Capillary refill takes less than 2 seconds.   Neurological:      Mental Status: She is alert and oriented to person, place, and time.   Psychiatric:         Mood and Affect: Mood normal.         Behavior: Behavior normal.         Thought Content: Thought content normal.         Judgment: Judgment normal.       Current outpatient and discharge medications have been reconciled for the patient.  Reviewed by: SYLVIA Hodge      Assessment/Plan   Diagnoses and all orders for this visit:    1. Encounter to establish care (Primary)    2. Healthcare maintenance    3. Asthma, persistent not controlled  -     CBC No Differential  -     Comprehensive metabolic panel  -     Lipid panel  -     TSH Rfx On Abnormal To Free T4  -     fluticasone (Flovent HFA) 110 MCG/ACT inhaler; Inhale 1 puff 2 (Two) Times a Day.  Dispense: 12 g; Refill: 0  -     albuterol sulfate  (90 Base) MCG/ACT inhaler; Inhale 2 puffs Every 4 (Four) Hours As Needed for Wheezing or Shortness of Air.  Dispense: 8 g; Refill: 0  -     montelukast (SINGULAIR) 10 MG tablet; Take 1 tablet by mouth every day  Dispense: 90 tablet; Refill: 1  -     methylPREDNISolone (MEDROL) 4 MG dose pack; Take as directed on package instructions.  Dispense: 21 tablet; Refill: 0      -Reviewed medical, surgical and social history.    -Healthcare maintenance: Routine OB/GYN follow-ups with Dr. Patel.  Recommend exercising 30 minutes/day at least 5 days/week.  Well-balanced diet.    -Asthma: Uncontrolled since she has been out of her maintenance Breo.  We will complete the prior authorization and in the meantime, until she can get the Breo and ProAir Respiclick I have sent in fluticasone inhaler 1 puff twice daily along with generic albuterol.    -Routine labs, CBC, CMP, lipid, TSH.    -We  will contact patient with her lab results and any further recommendations.  Follow-up as needed and I will see her back in 6 months for a physical.

## 2021-01-12 DIAGNOSIS — J45.998 ASTHMA, PERSISTENT NOT CONTROLLED: ICD-10-CM

## 2021-01-13 RX ORDER — ALBUTEROL SULFATE 90 UG/1
AEROSOL, METERED RESPIRATORY (INHALATION)
Qty: 8.5 G | Refills: 1 | Status: SHIPPED | OUTPATIENT
Start: 2021-01-13 | End: 2021-12-23 | Stop reason: ALTCHOICE

## 2021-01-26 ENCOUNTER — LAB (OUTPATIENT)
Dept: INTERNAL MEDICINE | Facility: CLINIC | Age: 26
End: 2021-01-26

## 2021-01-26 DIAGNOSIS — J45.998 ASTHMA, PERSISTENT NOT CONTROLLED: ICD-10-CM

## 2021-01-26 DIAGNOSIS — Z00.00 HEALTHCARE MAINTENANCE: Primary | ICD-10-CM

## 2021-01-27 ENCOUNTER — TELEPHONE (OUTPATIENT)
Dept: INTERNAL MEDICINE | Facility: CLINIC | Age: 26
End: 2021-01-27

## 2021-01-27 LAB
ALBUMIN SERPL-MCNC: 4.5 G/DL (ref 3.5–5.2)
ALBUMIN/GLOB SERPL: 1.9 G/DL
ALP SERPL-CCNC: 94 U/L (ref 39–117)
ALT SERPL-CCNC: 10 U/L (ref 1–33)
AST SERPL-CCNC: 9 U/L (ref 1–32)
BILIRUB SERPL-MCNC: 0.4 MG/DL (ref 0–1.2)
BUN SERPL-MCNC: 13 MG/DL (ref 6–20)
BUN/CREAT SERPL: 19.7 (ref 7–25)
CALCIUM SERPL-MCNC: 9.5 MG/DL (ref 8.6–10.5)
CHLORIDE SERPL-SCNC: 102 MMOL/L (ref 98–107)
CHOLEST SERPL-MCNC: 144 MG/DL (ref 0–200)
CO2 SERPL-SCNC: 25.7 MMOL/L (ref 22–29)
CREAT SERPL-MCNC: 0.66 MG/DL (ref 0.57–1)
ERYTHROCYTE [DISTWIDTH] IN BLOOD BY AUTOMATED COUNT: 13.9 % (ref 12.3–15.4)
GLOBULIN SER CALC-MCNC: 2.4 GM/DL
GLUCOSE SERPL-MCNC: 76 MG/DL (ref 65–99)
HCT VFR BLD AUTO: 43 % (ref 34–46.6)
HDLC SERPL-MCNC: 44 MG/DL (ref 40–60)
HGB BLD-MCNC: 14.2 G/DL (ref 12–15.9)
LDLC SERPL CALC-MCNC: 84 MG/DL (ref 0–100)
MCH RBC QN AUTO: 28 PG (ref 26.6–33)
MCHC RBC AUTO-ENTMCNC: 33 G/DL (ref 31.5–35.7)
MCV RBC AUTO: 84.8 FL (ref 79–97)
PLATELET # BLD AUTO: 263 10*3/MM3 (ref 140–450)
POTASSIUM SERPL-SCNC: 4.7 MMOL/L (ref 3.5–5.2)
PROT SERPL-MCNC: 6.9 G/DL (ref 6–8.5)
RBC # BLD AUTO: 5.07 10*6/MM3 (ref 3.77–5.28)
SODIUM SERPL-SCNC: 136 MMOL/L (ref 136–145)
TRIGL SERPL-MCNC: 81 MG/DL (ref 0–150)
TSH SERPL DL<=0.005 MIU/L-ACNC: 1.09 UIU/ML (ref 0.27–4.2)
VLDLC SERPL CALC-MCNC: 16 MG/DL (ref 5–40)
WBC # BLD AUTO: 8.75 10*3/MM3 (ref 3.4–10.8)

## 2021-01-27 NOTE — PROGRESS NOTES
Good morning, your labs are back and looking great. Thyroid normal. Blood count stable, normal white blood cells and no anemia. Cholesterol panel looks normal. Let me know of any questions or concerns,   SYLVIA Hodge

## 2021-01-27 NOTE — TELEPHONE ENCOUNTER
Due to fax communication errors, with multiple fax numbers for [employer] plan. I was unable to get Pt's paperwork to go through. Due to multiple attempts, I will be mailing a copy of the Pt's paperwork for her inhalers to [facility].

## 2021-02-05 RX ORDER — ALBUTEROL SULFATE 90 UG/1
2 POWDER, METERED RESPIRATORY (INHALATION) EVERY 4 HOURS PRN
Qty: 1 EACH | Refills: 5 | Status: SHIPPED | OUTPATIENT
Start: 2021-02-05 | End: 2021-03-03 | Stop reason: SDUPTHER

## 2021-03-03 DIAGNOSIS — J45.998 ASTHMA, PERSISTENT NOT CONTROLLED: Primary | ICD-10-CM

## 2021-03-03 RX ORDER — ALBUTEROL SULFATE 90 UG/1
2 POWDER, METERED RESPIRATORY (INHALATION) EVERY 4 HOURS PRN
Qty: 1 EACH | Refills: 5 | Status: SHIPPED | OUTPATIENT
Start: 2021-03-03 | End: 2021-12-23 | Stop reason: ALTCHOICE

## 2021-04-16 ENCOUNTER — BULK ORDERING (OUTPATIENT)
Dept: CASE MANAGEMENT | Facility: OTHER | Age: 26
End: 2021-04-16

## 2021-04-16 DIAGNOSIS — Z23 IMMUNIZATION DUE: ICD-10-CM

## 2021-06-11 ENCOUNTER — OFFICE VISIT (OUTPATIENT)
Dept: INTERNAL MEDICINE | Facility: CLINIC | Age: 26
End: 2021-06-11

## 2021-06-11 VITALS
HEART RATE: 98 BPM | HEIGHT: 63 IN | DIASTOLIC BLOOD PRESSURE: 64 MMHG | TEMPERATURE: 97.1 F | WEIGHT: 205 LBS | BODY MASS INDEX: 36.32 KG/M2 | OXYGEN SATURATION: 98 % | SYSTOLIC BLOOD PRESSURE: 114 MMHG

## 2021-06-11 DIAGNOSIS — T14.8XXA HEMATOMA: ICD-10-CM

## 2021-06-11 DIAGNOSIS — G43.809 OTHER MIGRAINE WITHOUT STATUS MIGRAINOSUS, NOT INTRACTABLE: Primary | ICD-10-CM

## 2021-06-11 PROCEDURE — 99213 OFFICE O/P EST LOW 20 MIN: CPT | Performed by: NURSE PRACTITIONER

## 2021-06-11 RX ORDER — AMITRIPTYLINE HYDROCHLORIDE 25 MG/1
25 TABLET, FILM COATED ORAL NIGHTLY
Qty: 30 TABLET | Refills: 1 | Status: SHIPPED | OUTPATIENT
Start: 2021-06-11 | End: 2021-12-23 | Stop reason: SDUPTHER

## 2021-06-11 NOTE — PROGRESS NOTES
"Chief Complaint  Headache    Subjective          Jana Nix presents to [facility]  Patient presents for evaluation of increasing migraine frequency.  This is a 26-year-old female.  Over the past several months she has had increased frequency of migraines as well as intensity.  She is having about 1 per week at this point.  Last migraine was last week, lasted about 12 hours, endorses light and sound sensitivity during the migraine.  She takes Excedrin and Tylenol which helped temporarily.  She has woken up with headaches in the recent past.  She has been working out more but has also been under a lot of stress and has not been sleeping very much.    Additionally, she hit the upper part of her right anterior lower leg 2 weeks ago on the side of her child's bunk bed.  This immediately caused swelling and bruising.  The bruising has gone away and the swelling has gone down but she endorses a continual \"hard knot\" under the skin where she get the area.  No pain with ambulation.  Mildly tender to the touch.    Denies development of any other new issues today.      Objective   Vital Signs:   /64   Pulse 98   Temp 97.1 °F (36.2 °C)   Ht 160 cm (63\")   Wt 93 kg (205 lb)   SpO2 98%   BMI 36.31 kg/m²     Physical Exam  Vitals and nursing note reviewed.   Constitutional:       Appearance: Normal appearance. She is well-developed.   HENT:      Head: Normocephalic and atraumatic.      Right Ear: External ear normal.      Left Ear: External ear normal.   Eyes:      Pupils: Pupils are equal, round, and reactive to light.   Neck:      Vascular: No carotid bruit.   Cardiovascular:      Rate and Rhythm: Normal rate and regular rhythm.      Pulses: Normal pulses.      Heart sounds: Normal heart sounds.   Pulmonary:      Effort: Pulmonary effort is normal.      Breath sounds: Normal breath sounds.   Abdominal:      General: Bowel sounds are normal. There is no distension.      Palpations: " Abdomen is soft.      Tenderness: There is no abdominal tenderness.   Musculoskeletal:         General: Normal range of motion.      Cervical back: Normal range of motion and neck supple. No rigidity or tenderness.   Lymphadenopathy:      Cervical: No cervical adenopathy.   Skin:     General: Skin is warm and dry.      Capillary Refill: Capillary refill takes less than 2 seconds.      Comments: Right anterior lower leg, beneath knee, hematoma, no erythema, mildly tender to touch   Neurological:      General: No focal deficit present.      Mental Status: She is alert and oriented to person, place, and time. Mental status is at baseline.      Coordination: Coordination normal.      Gait: Gait normal.      Comments: PERRLA.  No focal neurologic deficit.   Psychiatric:         Mood and Affect: Mood normal.         Behavior: Behavior normal.         Thought Content: Thought content normal.         Judgment: Judgment normal.        Result Review :   The following data was reviewed by: SYLVIA Royal on 06/11/2021:  Common labs    Common Labsle 1/26/21 1/26/21 1/26/21    1430 1430 1430   Glucose 76     BUN 13     Creatinine 0.66     eGFR Non  Am 109     eGFR African Am 132     Sodium 136     Potassium 4.7     Chloride 102     Calcium 9.5     Total Protein 6.9     Albumin 4.50     Total Bilirubin 0.4     Alkaline Phosphatase 94     AST (SGOT) 9     ALT (SGPT) 10     WBC   8.75   Hemoglobin   14.2   Hematocrit   43.0   Platelets   263   Total Cholesterol  144    Triglycerides  81    HDL Cholesterol  44    LDL Cholesterol   84       Comments are available for some flowsheets but are not being displayed.           Current outpatient and discharge medications have been reconciled for the patient.  Reviewed by: SYLVIA Royal           Assessment and Plan    Diagnoses and all orders for this visit:    1. Other migraine without status migrainosus, not intractable (Primary)  -     amitriptyline (ELAVIL) 25 MG  tablet; Take 1 tablet by mouth Every Night.  Dispense: 30 tablet; Refill: 1  -     TSH Rfx On Abnormal To Free T4  -     CBC No Differential  -     Comprehensive metabolic panel    2. Hematoma      Increase in migraines are likely multifactorial.  Checking CBC, CMP, TSH today.  Discussed stress reduction and increasing her sleep and hydration, regular exercise.  Adding amitriptyline 25 mg nightly.  Educated on this medication and potential side effects.    Hematoma of right lower leg: This is improving.  I encouraged elevation and heat.    Follow-up as needed if symptoms persist or worsen and I will see her back at her next scheduled office visit.    Follow Up   No follow-ups on file.  Patient was given instructions and counseling regarding her condition or for health maintenance advice. Please see specific information pulled into the AVS if appropriate.

## 2021-06-12 LAB
ALBUMIN SERPL-MCNC: 4.8 G/DL (ref 3.5–5.2)
ALBUMIN/GLOB SERPL: 2.5 G/DL
ALP SERPL-CCNC: 87 U/L (ref 39–117)
ALT SERPL-CCNC: 12 U/L (ref 1–33)
AST SERPL-CCNC: 7 U/L (ref 1–32)
BILIRUB SERPL-MCNC: 0.3 MG/DL (ref 0–1.2)
BUN SERPL-MCNC: 10 MG/DL (ref 6–20)
BUN/CREAT SERPL: 13.5 (ref 7–25)
CALCIUM SERPL-MCNC: 9 MG/DL (ref 8.6–10.5)
CHLORIDE SERPL-SCNC: 104 MMOL/L (ref 98–107)
CO2 SERPL-SCNC: 26.9 MMOL/L (ref 22–29)
CREAT SERPL-MCNC: 0.74 MG/DL (ref 0.57–1)
ERYTHROCYTE [DISTWIDTH] IN BLOOD BY AUTOMATED COUNT: 13.1 % (ref 12.3–15.4)
GLOBULIN SER CALC-MCNC: 1.9 GM/DL
GLUCOSE SERPL-MCNC: 107 MG/DL (ref 65–99)
HCT VFR BLD AUTO: 39.4 % (ref 34–46.6)
HGB BLD-MCNC: 13.2 G/DL (ref 12–15.9)
MCH RBC QN AUTO: 27.7 PG (ref 26.6–33)
MCHC RBC AUTO-ENTMCNC: 33.5 G/DL (ref 31.5–35.7)
MCV RBC AUTO: 82.6 FL (ref 79–97)
PLATELET # BLD AUTO: 262 10*3/MM3 (ref 140–450)
POTASSIUM SERPL-SCNC: 3.7 MMOL/L (ref 3.5–5.2)
PROT SERPL-MCNC: 6.7 G/DL (ref 6–8.5)
RBC # BLD AUTO: 4.77 10*6/MM3 (ref 3.77–5.28)
SODIUM SERPL-SCNC: 143 MMOL/L (ref 136–145)
TSH SERPL DL<=0.005 MIU/L-ACNC: 1.24 UIU/ML (ref 0.27–4.2)
WBC # BLD AUTO: 6.89 10*3/MM3 (ref 3.4–10.8)

## 2021-06-14 NOTE — PROGRESS NOTES
Good afternoon,   Your thyroid numbers are normal. Blood count showing no anemia, normal platelets and white blood cells.  Metabolic panel is stable including kidney function, liver enzymes and electrolytes.  Let me know of any questions and have a good day,SYLVIA Royal

## 2021-07-06 ENCOUNTER — OFFICE VISIT (OUTPATIENT)
Dept: INTERNAL MEDICINE | Facility: CLINIC | Age: 26
End: 2021-07-06

## 2021-07-06 VITALS
OXYGEN SATURATION: 97 % | SYSTOLIC BLOOD PRESSURE: 108 MMHG | HEIGHT: 63 IN | TEMPERATURE: 98.2 F | DIASTOLIC BLOOD PRESSURE: 74 MMHG | RESPIRATION RATE: 17 BRPM | HEART RATE: 102 BPM | BODY MASS INDEX: 36.68 KG/M2 | WEIGHT: 207 LBS

## 2021-07-06 DIAGNOSIS — L23.9 ALLERGIC DERMATITIS: Primary | ICD-10-CM

## 2021-07-06 PROCEDURE — 99213 OFFICE O/P EST LOW 20 MIN: CPT | Performed by: NURSE PRACTITIONER

## 2021-07-06 RX ORDER — HYDROXYZINE HYDROCHLORIDE 25 MG/1
25 TABLET, FILM COATED ORAL EVERY 8 HOURS PRN
Qty: 60 TABLET | Refills: 0 | Status: SHIPPED | OUTPATIENT
Start: 2021-07-06 | End: 2022-09-01

## 2021-07-06 RX ORDER — METHYLPREDNISOLONE 4 MG/1
TABLET ORAL
Qty: 21 TABLET | Refills: 0 | Status: SHIPPED | OUTPATIENT
Start: 2021-07-06 | End: 2021-12-23

## 2021-07-06 NOTE — PROGRESS NOTES
"Chief Complaint  Rash (Pt presents here today with a rash through out the body.)    Subjective          Jana Nix presents to [facility]  Patient presents for evaluation of a rash.  This is a 26-year-old female.  4 days ago she was cleaning out shrubbery for a large portion of the day.  She has a history of multiple environmental allergies.  Since that day she has developed a rash, maculopapular to the arms, torso (with the exception of the back) as well as bilateral legs.  It is itchy, waking her up at night.  No drainage.  No fever, chills.  She has tried warm showers which soothe the itching temporarily, calamine spray which does not work very well.  She takes Singulair daily.  Denies development of any other new issues today.      Objective   Vital Signs:   /74 (BP Location: Right arm, Patient Position: Sitting, Cuff Size: Large Adult)   Pulse 102   Temp 98.2 °F (36.8 °C)   Resp 17   Ht 160 cm (63\")   Wt 93.9 kg (207 lb)   SpO2 97%   BMI 36.67 kg/m²     Physical Exam  Vitals and nursing note reviewed.   Constitutional:       Appearance: She is well-developed.   HENT:      Head: Atraumatic.   Eyes:      Pupils: Pupils are equal, round, and reactive to light.   Cardiovascular:      Rate and Rhythm: Normal rate and regular rhythm.      Pulses: Normal pulses.      Heart sounds: Normal heart sounds.   Pulmonary:      Effort: Pulmonary effort is normal.      Breath sounds: Normal breath sounds.   Abdominal:      General: Bowel sounds are normal.      Palpations: Abdomen is soft.   Musculoskeletal:         General: Normal range of motion.      Cervical back: Normal range of motion and neck supple.   Skin:     General: Skin is warm and dry.      Capillary Refill: Capillary refill takes less than 2 seconds.      Findings: Rash (  Bilateral arms, abdomen/chest, bilateral legs, maculopapular, pruritic  ; no drainage) present.   Neurological:      Mental Status: She is alert " and oriented to person, place, and time.   Psychiatric:         Mood and Affect: Mood normal.         Behavior: Behavior normal.         Thought Content: Thought content normal.         Judgment: Judgment normal.        Result Review :   The following data was reviewed by: SYLVIA Royal on 07/06/2021:  Common labs    Common Labsle 1/26/21 1/26/21 1/26/21 6/11/21 6/11/21    1430 1430 1430 1625 1625   Glucose 76    107 (A)   BUN 13    10   Creatinine 0.66    0.74   eGFR Non  Am 109    95   eGFR African Am 132    115   Sodium 136    143   Potassium 4.7    3.7   Chloride 102    104   Calcium 9.5    9.0   Total Protein 6.9    6.7   Albumin 4.50    4.80   Total Bilirubin 0.4    0.3   Alkaline Phosphatase 94    87   AST (SGOT) 9    7   ALT (SGPT) 10    12   WBC   8.75 6.89    Hemoglobin   14.2 13.2    Hematocrit   43.0 39.4    Platelets   263 262    Total Cholesterol  144      Triglycerides  81      HDL Cholesterol  44      LDL Cholesterol   84      (A) Abnormal value       Comments are available for some flowsheets but are not being displayed.           Current outpatient and discharge medications have been reconciled for the patient.  Reviewed by: SYLVIA Royal           Assessment and Plan    Diagnoses and all orders for this visit:    1. Allergic dermatitis (Primary)  -     methylPREDNISolone (MEDROL) 4 MG dose pack; Take as directed on package instructions.  Dispense: 21 tablet; Refill: 0  -     hydrOXYzine (ATARAX) 25 MG tablet; Take 1 tablet by mouth Every 8 (Eight) Hours As Needed for Itching.  Dispense: 60 tablet; Refill: 0      Due to widespread nature of rash and severity of itching I will prescribe Medrol Dosepak, hydroxyzine every 8 hours as needed for itching.  She will use Benadryl ointment on severely itchy areas.  She has mupirocin ointment at home and if she develops any drainage or redness surrounding sites of itching she will apply mupirocin.  Counseled to avoid environmental  allergens as much as possible and if working in the yard, wearing light weight long sleeves and long pants to avoid exposure.  Follow-up as needed if symptoms persist or worsen and at next scheduled office visit.      Follow Up   Return if symptoms worsen or fail to improve, for Next scheduled follow up.  Patient was given instructions and counseling regarding her condition or for health maintenance advice. Please see specific information pulled into the AVS if appropriate.

## 2021-12-23 ENCOUNTER — OFFICE VISIT (OUTPATIENT)
Dept: INTERNAL MEDICINE | Facility: CLINIC | Age: 26
End: 2021-12-23

## 2021-12-23 VITALS
BODY MASS INDEX: 36.82 KG/M2 | DIASTOLIC BLOOD PRESSURE: 78 MMHG | OXYGEN SATURATION: 95 % | RESPIRATION RATE: 16 BRPM | HEART RATE: 119 BPM | SYSTOLIC BLOOD PRESSURE: 114 MMHG | WEIGHT: 207.8 LBS | HEIGHT: 63 IN | TEMPERATURE: 96.8 F

## 2021-12-23 DIAGNOSIS — Z00.00 HEALTHCARE MAINTENANCE: ICD-10-CM

## 2021-12-23 DIAGNOSIS — Z00.00 ANNUAL PHYSICAL EXAM: Primary | ICD-10-CM

## 2021-12-23 DIAGNOSIS — J45.30 MILD PERSISTENT ASTHMA WITHOUT COMPLICATION: ICD-10-CM

## 2021-12-23 DIAGNOSIS — Z13.220 ENCOUNTER FOR LIPID SCREENING FOR CARDIOVASCULAR DISEASE: ICD-10-CM

## 2021-12-23 DIAGNOSIS — Z13.6 ENCOUNTER FOR LIPID SCREENING FOR CARDIOVASCULAR DISEASE: ICD-10-CM

## 2021-12-23 DIAGNOSIS — G43.809 OTHER MIGRAINE WITHOUT STATUS MIGRAINOSUS, NOT INTRACTABLE: ICD-10-CM

## 2021-12-23 PROBLEM — G43.909 MIGRAINE: Chronic | Status: ACTIVE | Noted: 2021-12-23

## 2021-12-23 PROBLEM — G43.909 MIGRAINE: Status: ACTIVE | Noted: 2021-12-23

## 2021-12-23 PROCEDURE — 99395 PREV VISIT EST AGE 18-39: CPT | Performed by: NURSE PRACTITIONER

## 2021-12-23 RX ORDER — ALBUTEROL SULFATE 90 UG/1
2 POWDER, METERED RESPIRATORY (INHALATION) EVERY 4 HOURS PRN
Qty: 1 EACH | Refills: 5 | Status: SHIPPED | OUTPATIENT
Start: 2021-12-23 | End: 2022-08-25 | Stop reason: SDUPTHER

## 2021-12-23 RX ORDER — AMITRIPTYLINE HYDROCHLORIDE 25 MG/1
25 TABLET, FILM COATED ORAL NIGHTLY
Qty: 30 TABLET | Refills: 1 | Status: SHIPPED | OUTPATIENT
Start: 2021-12-23 | End: 2022-10-17 | Stop reason: ALTCHOICE

## 2021-12-23 NOTE — PROGRESS NOTES
"Chief Complaint  Annual Exam    Subjective          Jana Nix presents to [facility]  Patient presents for an annual physical.  This is a 26-year-old female.    BMI is 36.8.  Stable since last check, BMI is 36.7.  She reports that she is exercising semiregularly and endorses a generally well-balanced diet.    She endorses being up-to-date on dental and vision exams.    She does not drink alcohol in excess, just socially.  Denies any history of tobacco use.    She is overdue for Pap smear and plans to schedule with her gynecologist, Dr. Amanda goss.    She has asthma and reports that this has worsened over the past few months as she has been out of her Breo for maintenance.  She has been having to use more albuterol.  She does take Singulair daily.    She has chronic migraines and in the past has been prescribed amitriptyline for maintenance/preventative therapy.  She has yet to start this as a new medication.    Denies development of other new issues today.      Objective   Vital Signs:   /78 (BP Location: Left arm, Patient Position: Sitting, Cuff Size: Adult)   Pulse 119   Temp 96.8 °F (36 °C) (Temporal)   Resp 16   Ht 160 cm (63\")   Wt 94.3 kg (207 lb 12.8 oz)   SpO2 95%   BMI 36.81 kg/m²     Physical Exam  Vitals and nursing note reviewed.   Constitutional:       General: She is not in acute distress.     Appearance: Normal appearance. She is well-developed. She is not ill-appearing, toxic-appearing or diaphoretic.   HENT:      Head: Normocephalic and atraumatic.      Right Ear: Tympanic membrane, ear canal and external ear normal.      Left Ear: Tympanic membrane, ear canal and external ear normal.   Eyes:      Pupils: Pupils are equal, round, and reactive to light.   Neck:      Vascular: No carotid bruit.   Cardiovascular:      Rate and Rhythm: Normal rate and regular rhythm.      Pulses: Normal pulses.      Heart sounds: Normal heart sounds.      Comments: No " peripheral edema  Pulmonary:      Effort: Pulmonary effort is normal. No respiratory distress.      Breath sounds: Normal breath sounds. No stridor. No wheezing, rhonchi or rales.   Chest:      Chest wall: No tenderness.   Abdominal:      General: Bowel sounds are normal. There is no distension.      Palpations: Abdomen is soft. There is no mass.      Tenderness: There is no abdominal tenderness. There is no right CVA tenderness, left CVA tenderness, guarding or rebound.      Hernia: No hernia is present.   Musculoskeletal:         General: Normal range of motion.      Cervical back: Normal range of motion and neck supple. No rigidity or tenderness.   Lymphadenopathy:      Cervical: No cervical adenopathy.   Skin:     General: Skin is warm and dry.      Capillary Refill: Capillary refill takes less than 2 seconds.   Neurological:      General: No focal deficit present.      Mental Status: She is alert and oriented to person, place, and time. Mental status is at baseline.   Psychiatric:         Mood and Affect: Mood normal.         Behavior: Behavior normal.         Thought Content: Thought content normal.         Judgment: Judgment normal.        Result Review :   The following data was reviewed by: SYLVIA Royal on 12/23/2021:  Common labs    Common Labsle 1/26/21 1/26/21 1/26/21 6/11/21 6/11/21    1430 1430 1430 1625 1625   Glucose 76    107 (A)   BUN 13    10   Creatinine 0.66    0.74   eGFR Non  Am 109    95   eGFR African Am 132    115   Sodium 136    143   Potassium 4.7    3.7   Chloride 102    104   Calcium 9.5    9.0   Total Protein 6.9    6.7   Albumin 4.50    4.80   Total Bilirubin 0.4    0.3   Alkaline Phosphatase 94    87   AST (SGOT) 9    7   ALT (SGPT) 10    12   WBC   8.75 6.89    Hemoglobin   14.2 13.2    Hematocrit   43.0 39.4    Platelets   263 262    Total Cholesterol  144      Triglycerides  81      HDL Cholesterol  44      LDL Cholesterol   84      (A) Abnormal value       Comments  are available for some flowsheets but are not being displayed.           Current outpatient and discharge medications have been reconciled for the patient.  Reviewed by: SYLVIA Royal           Assessment and Plan    Diagnoses and all orders for this visit:    1. Annual physical exam (Primary)    2. Mild persistent asthma without complication  Assessment & Plan:  Asthma is worsening but she has been out of her maintenance Breo inhaler which I refilled.  Refilled albuterol rescue inhaler as well.      Orders:  -     Fluticasone Furoate-Vilanterol (BREO ELLIPTA) 100-25 MCG/INH inhaler; Inhale 1 puff by mouth daily.  Dispense: 60 each; Refill: 5  -     albuterol (ProAir RespiClick) 108 (90 Base) MCG/ACT inhaler; Inhale 2 puffs Every 4 (Four) Hours As Needed for Wheezing.  Dispense: 1 each; Refill: 5    3. Healthcare maintenance  Assessment & Plan:  Continue regular dental and vision exams.     Regular exercise is recommended, goal of 30 minutes/day 5 days/week along with well-balanced diet.    Discussed the importance of monthly breast self exams.    Continue with regular GYN visits, she plans to make an appointment with her gynecologist for a Pap smear soon.    Up-to-date on vaccinations including flu shot and Covid.    Orders:  -     CBC No Differential  -     Comprehensive metabolic panel  -     TSH Rfx On Abnormal To Free T4  -     Urinalysis With Culture If Indicated -    4. Encounter for lipid screening for cardiovascular disease  -     Lipid panel    5. Other migraine without status migrainosus, not intractable  Assessment & Plan:  She wants to start the amitriptyline for maintenance/preventative therapy.  Amitriptyline 25 mg nightly is sent after discussion regarding sedating side effects.        Orders:  -     amitriptyline (ELAVIL) 25 MG tablet; Take 1 tablet by mouth Every Night.  Dispense: 30 tablet; Refill: 1    We will contact patient with lab results and any further recommendations.  Follow-up as  needed and I will see her back in 1 year for a physical.    Follow Up   Return in about 1 year (around 12/23/2022) for Annual physical.  Patient was given instructions and counseling regarding her condition or for health maintenance advice. Please see specific information pulled into the AVS if appropriate.

## 2021-12-23 NOTE — ASSESSMENT & PLAN NOTE
Asthma is worsening but she has been out of her maintenance Breo inhaler which I refilled.  Refilled albuterol rescue inhaler as well.

## 2021-12-23 NOTE — ASSESSMENT & PLAN NOTE
Continue regular dental and vision exams.     Regular exercise is recommended, goal of 30 minutes/day 5 days/week along with well-balanced diet.    Discussed the importance of monthly breast self exams.    Continue with regular GYN visits, she plans to make an appointment with her gynecologist for a Pap smear soon.    Up-to-date on vaccinations including flu shot and Covid.

## 2021-12-23 NOTE — ASSESSMENT & PLAN NOTE
She wants to start the amitriptyline for maintenance/preventative therapy.  Amitriptyline 25 mg nightly is sent after discussion regarding sedating side effects.

## 2021-12-24 LAB
ALBUMIN SERPL-MCNC: 4.7 G/DL (ref 3.9–5)
ALBUMIN/GLOB SERPL: 1.9 {RATIO} (ref 1.2–2.2)
ALP SERPL-CCNC: 109 IU/L (ref 44–121)
ALT SERPL-CCNC: 12 IU/L (ref 0–32)
AST SERPL-CCNC: 10 IU/L (ref 0–40)
BILIRUB SERPL-MCNC: 0.3 MG/DL (ref 0–1.2)
BUN SERPL-MCNC: 10 MG/DL (ref 6–20)
BUN/CREAT SERPL: 14 (ref 9–23)
CALCIUM SERPL-MCNC: 9.7 MG/DL (ref 8.7–10.2)
CHLORIDE SERPL-SCNC: 102 MMOL/L (ref 96–106)
CHOLEST SERPL-MCNC: 166 MG/DL (ref 100–199)
CO2 SERPL-SCNC: 21 MMOL/L (ref 20–29)
CREAT SERPL-MCNC: 0.74 MG/DL (ref 0.57–1)
ERYTHROCYTE [DISTWIDTH] IN BLOOD BY AUTOMATED COUNT: 13.2 % (ref 11.7–15.4)
GLOBULIN SER CALC-MCNC: 2.5 G/DL (ref 1.5–4.5)
GLUCOSE SERPL-MCNC: 106 MG/DL (ref 65–99)
HCT VFR BLD AUTO: 41.7 % (ref 34–46.6)
HDLC SERPL-MCNC: 49 MG/DL
HGB BLD-MCNC: 14.4 G/DL (ref 11.1–15.9)
LDLC SERPL CALC-MCNC: 99 MG/DL (ref 0–99)
MCH RBC QN AUTO: 28.1 PG (ref 26.6–33)
MCHC RBC AUTO-ENTMCNC: 34.5 G/DL (ref 31.5–35.7)
MCV RBC AUTO: 81 FL (ref 79–97)
PLATELET # BLD AUTO: 297 X10E3/UL (ref 150–450)
POTASSIUM SERPL-SCNC: 4.4 MMOL/L (ref 3.5–5.2)
PROT SERPL-MCNC: 7.2 G/DL (ref 6–8.5)
RBC # BLD AUTO: 5.13 X10E6/UL (ref 3.77–5.28)
SODIUM SERPL-SCNC: 139 MMOL/L (ref 134–144)
TRIGL SERPL-MCNC: 101 MG/DL (ref 0–149)
TSH SERPL DL<=0.005 MIU/L-ACNC: 0.9 UIU/ML (ref 0.45–4.5)
VLDLC SERPL CALC-MCNC: 18 MG/DL (ref 5–40)
WBC # BLD AUTO: 10.8 X10E3/UL (ref 3.4–10.8)

## 2021-12-27 NOTE — PROGRESS NOTES
Good afternoon, labs are back.  Blood count looks good, no anemia, normal white blood cells and platelets.  Metabolic panel is showing mildly elevated blood sugar.  Electrolytes, kidney function and liver enzymes look stable.  Cholesterol panel looks good and thyroid numbers are normal.  Let me know of any questions and have a good day,SYLVIA Royal

## 2022-02-22 ENCOUNTER — OFFICE VISIT (OUTPATIENT)
Dept: INTERNAL MEDICINE | Facility: CLINIC | Age: 27
End: 2022-02-22

## 2022-02-22 VITALS
TEMPERATURE: 98.4 F | HEIGHT: 63 IN | WEIGHT: 212 LBS | SYSTOLIC BLOOD PRESSURE: 121 MMHG | OXYGEN SATURATION: 97 % | RESPIRATION RATE: 16 BRPM | DIASTOLIC BLOOD PRESSURE: 85 MMHG | BODY MASS INDEX: 37.56 KG/M2 | HEART RATE: 93 BPM

## 2022-02-22 DIAGNOSIS — J45.30 MILD PERSISTENT ASTHMA WITHOUT COMPLICATION: Primary | ICD-10-CM

## 2022-02-22 PROCEDURE — 99213 OFFICE O/P EST LOW 20 MIN: CPT | Performed by: NURSE PRACTITIONER

## 2022-02-22 NOTE — PROGRESS NOTES
"Chief Complaint  Asthma (Pt presents here today with concerns of asthma worsening. x 2 week.)    Subjective          Jana Nix presents to [facility]  Patient presents for eval of worsening asthma. This is a 26 YOF. She has been taking Breo 100-25 daily consistently for the past approx 2 months but still having to use albuterol 2-3 times per day. In that timeframe she has started exercising consistently and wonders if this is contributing to worsening asthma. She has not been taking the Singulair consistently. Denies development of other new issues today.       Objective   Vital Signs:   /85 (BP Location: Right arm, Patient Position: Sitting, Cuff Size: Large Adult)   Pulse 93   Temp 98.4 °F (36.9 °C)   Resp 16   Ht 160 cm (63\")   Wt 96.2 kg (212 lb)   SpO2 97%   BMI 37.55 kg/m²     Physical Exam  Vitals and nursing note reviewed.   Constitutional:       General: She is not in acute distress.     Appearance: Normal appearance. She is well-developed. She is not ill-appearing, toxic-appearing or diaphoretic.   HENT:      Head: Normocephalic and atraumatic.      Right Ear: External ear normal.      Left Ear: External ear normal.   Eyes:      Pupils: Pupils are equal, round, and reactive to light.   Neck:      Vascular: No carotid bruit.   Cardiovascular:      Rate and Rhythm: Normal rate and regular rhythm.      Pulses: Normal pulses.      Heart sounds: Normal heart sounds.   Pulmonary:      Effort: Pulmonary effort is normal. No respiratory distress.      Breath sounds: Normal breath sounds. No stridor. No wheezing, rhonchi or rales.   Chest:      Chest wall: No tenderness.   Abdominal:      General: Bowel sounds are normal. There is no distension.      Palpations: Abdomen is soft.      Tenderness: There is no abdominal tenderness.   Musculoskeletal:         General: Normal range of motion.      Cervical back: Normal range of motion and neck supple. No rigidity or " tenderness.   Lymphadenopathy:      Cervical: No cervical adenopathy.   Skin:     General: Skin is warm and dry.      Capillary Refill: Capillary refill takes less than 2 seconds.   Neurological:      General: No focal deficit present.      Mental Status: She is alert and oriented to person, place, and time. Mental status is at baseline.   Psychiatric:         Mood and Affect: Mood normal.         Behavior: Behavior normal.         Thought Content: Thought content normal.         Judgment: Judgment normal.        Result Review :   The following data was reviewed by: SYLVIA Royal on 02/22/2022:  Common labs    Common Labsle 6/11/21 6/11/21 12/23/21 12/23/21 12/23/21    1625 1625 1327 1327 1327   Glucose  107 (A)  106 (A)    BUN  10  10    Creatinine  0.74  0.74    eGFR Non  Am  95  112    eGFR African Am  115  129    Sodium  143  139    Potassium  3.7  4.4    Chloride  104  102    Calcium  9.0  9.7    Total Protein  6.7  7.2    Albumin  4.80  4.7    Total Bilirubin  0.3  0.3    Alkaline Phosphatase  87  109    AST (SGOT)  7  10    ALT (SGPT)  12  12    WBC 6.89  10.8     Hemoglobin 13.2  14.4     Hematocrit 39.4  41.7     Platelets 262  297     Total Cholesterol     166   Triglycerides     101   HDL Cholesterol     49   LDL Cholesterol      99   (A) Abnormal value       Comments are available for some flowsheets but are not being displayed.           Current outpatient and discharge medications have been reconciled for the patient.  Reviewed by: SYLVIA Royal           Assessment and Plan    Diagnoses and all orders for this visit:    1. Mild persistent asthma without complication (Primary)  -     Fluticasone Furoate-Vilanterol (Breo Ellipta) 200-25 MCG/INH inhaler; Inhale 1 puff Daily.  Dispense: 28 each; Refill: 2      Increase Breo to 200-25 mg puff daily, add back consistent Singulair daily.     Follow up if symptoms persist or worsen, next step would be to likely add anticholinergic  inhaler.         Follow Up   Return if symptoms worsen or fail to improve, for Next scheduled follow up.  Patient was given instructions and counseling regarding her condition or for health maintenance advice. Please see specific information pulled into the AVS if appropriate.

## 2022-05-13 ENCOUNTER — TELEPHONE (OUTPATIENT)
Dept: INTERNAL MEDICINE | Facility: CLINIC | Age: 27
End: 2022-05-13

## 2022-05-13 NOTE — TELEPHONE ENCOUNTER
PATIENT CALLED STATING THAT THE PHARMACY IS REQUESTING PRIOR AUTHORIZATION FOR THE albuterol (ProAir RespiClick) 108 (90 Base) MCG/ACT inhaler    PLEASE ADVISE  [phone]

## 2022-08-25 DIAGNOSIS — J45.30 MILD PERSISTENT ASTHMA WITHOUT COMPLICATION: ICD-10-CM

## 2022-08-25 RX ORDER — ALBUTEROL SULFATE 90 UG/1
2 POWDER, METERED RESPIRATORY (INHALATION) EVERY 4 HOURS PRN
Qty: 1 EACH | Refills: 5 | Status: SHIPPED | OUTPATIENT
Start: 2022-08-25

## 2022-09-01 ENCOUNTER — TELEMEDICINE (OUTPATIENT)
Dept: INTERNAL MEDICINE | Facility: CLINIC | Age: 27
End: 2022-09-01

## 2022-09-01 DIAGNOSIS — F41.0 PANIC: ICD-10-CM

## 2022-09-01 DIAGNOSIS — F41.9 ANXIETY: Primary | ICD-10-CM

## 2022-09-01 PROCEDURE — 99213 OFFICE O/P EST LOW 20 MIN: CPT | Performed by: NURSE PRACTITIONER

## 2022-09-01 RX ORDER — HYDROXYZINE HYDROCHLORIDE 25 MG/1
12.5-25 TABLET, FILM COATED ORAL 3 TIMES DAILY PRN
Qty: 90 TABLET | Refills: 0 | Status: SHIPPED | OUTPATIENT
Start: 2022-09-01

## 2022-09-01 RX ORDER — ESCITALOPRAM OXALATE 5 MG/1
5 TABLET ORAL DAILY
Qty: 90 TABLET | Refills: 0 | Status: SHIPPED | OUTPATIENT
Start: 2022-09-01 | End: 2022-10-06 | Stop reason: DRUGHIGH

## 2022-09-01 NOTE — PROGRESS NOTES
"Chief Complaint  Anxiety  You have chosen to receive care through a telehealth visit.  Do you consent to use a video/audio connection for your medical care today? Yes    This visit was conducted via video.  The patient was in a secure location on her personal electronic device and I was at the office.    Subjective        Jana Nix presents to [facility]  Patient presents for evaluation of worsening anxiety and panic.  This is a 27-year-old female.  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  She has also had several stressful life events recently including hospitalization of her father and mother-in-law.  She is having acute exacerbation of existing anxiety.  Currently on no pharmacologic therapy, maintenance or as needed for anxiety.  She is seeing a therapist who encouraged her to reach out to her PCP.  Denies SI/HI but does want to get a hold of the anxiety before it worsens.  She endorses panic attacks intermittently over the past couple of weeks.  In the past she has taken Wellbutrin and Abilify 6 to 7 years ago but denies any other prior pharmacologic therapy for anxiety or depression.  Denies development of other new issues today.      Objective   Vital Signs:  There were no vitals taken for this visit.  Estimated body mass index is 37.55 kg/m² as calculated from the following:    Height as of 2/22/22: 160 cm (63\").    Weight as of 2/22/22: 96.2 kg (212 lb).          Physical Exam  Vitals reviewed: This visit was conducted via video therefore no physical exam was performed.        Result Review :  The following data was reviewed by: SYLVIA Royal on 09/01/2022:  Common labs    Common Labsle 12/23/21 12/23/21 12/23/21    1327 1327 1327   Glucose  106 (A)    BUN  10    Creatinine  0.74    eGFR Non  Am  112    eGFR African Am  129    Sodium  139    Potassium  4.4    Chloride  102    Calcium  9.7    Total Protein  7.2    Albumin  4.7    Total " Bilirubin  0.3    Alkaline Phosphatase  109    AST (SGOT)  10    ALT (SGPT)  12    WBC 10.8     Hemoglobin 14.4     Hematocrit 41.7     Platelets 297     Total Cholesterol   166   Triglycerides   101   HDL Cholesterol   49   LDL Cholesterol    99   (A) Abnormal value       Comments are available for some flowsheets but are not being displayed.           Current outpatient and discharge medications have been reconciled for the patient.  Reviewed by: SYLVIA Royal           Assessment and Plan   Diagnoses and all orders for this visit:    1. Anxiety (Primary)  -     escitalopram (Lexapro) 5 MG tablet; Take 1 tablet by mouth Daily.  Dispense: 90 tablet; Refill: 0  -     hydrOXYzine (ATARAX) 25 MG tablet; Take 0.5-1 tablets by mouth 3 (Three) Times a Day As Needed (Panic/situational anxiety).  Dispense: 90 tablet; Refill: 0    2. Panic  -     escitalopram (Lexapro) 5 MG tablet; Take 1 tablet by mouth Daily.  Dispense: 90 tablet; Refill: 0  -     hydrOXYzine (ATARAX) 25 MG tablet; Take 0.5-1 tablets by mouth 3 (Three) Times a Day As Needed (Panic/situational anxiety).  Dispense: 90 tablet; Refill: 0    I will start Lexapro 5 mg daily after discussion regarding mechanism of action and potential side effects.  Discussed that this medication takes 4 to 6 weeks to reach full therapeutic effect.    I will start hydroxyzine 12.5 to 25 mg 3 times daily as needed for situational panic and anxiety.  Again discussed potential side effects and mechanism of action.    I will see her for a follow-up in 1 month to discuss efficacy.  Follow-up as needed in the meantime.         Follow Up   No follow-ups on file.  Patient was given instructions and counseling regarding her condition or for health maintenance advice. Please see specific information pulled into the AVS if appropriate.

## 2022-09-23 ENCOUNTER — TELEPHONE (OUTPATIENT)
Dept: INTERNAL MEDICINE | Facility: CLINIC | Age: 27
End: 2022-09-23

## 2022-09-23 NOTE — TELEPHONE ENCOUNTER
PT CALLED STATING SHE IS HAVING SOME CHEST PAIN WHEN SHE BREATHS IN AND COULD FEEL THEM IN HER BACK I SUGGESTED GOING TO THE ER SHE REFUSED STATING SHE KNOWS WHAT IT IS I TRIED TO GET HER A SOONER APPOINTMENT WHICH WAS ALSO REFUSED DUE TO THE TIME PT STATES SHE WILL ALK TO [place] AT HER APPOINTMENT

## 2022-09-26 ENCOUNTER — OFFICE VISIT (OUTPATIENT)
Dept: INTERNAL MEDICINE | Facility: CLINIC | Age: 27
End: 2022-09-26

## 2022-09-26 ENCOUNTER — HOSPITAL ENCOUNTER (OUTPATIENT)
Dept: CT IMAGING | Facility: HOSPITAL | Age: 27
Discharge: HOME | End: 2022-09-26
Admitting: NURSE PRACTITIONER

## 2022-09-26 VITALS
HEIGHT: 63 IN | HEART RATE: 118 BPM | TEMPERATURE: 98 F | OXYGEN SATURATION: 98 % | SYSTOLIC BLOOD PRESSURE: 114 MMHG | DIASTOLIC BLOOD PRESSURE: 82 MMHG | WEIGHT: 218 LBS | BODY MASS INDEX: 38.62 KG/M2

## 2022-09-26 DIAGNOSIS — R10.31 RIGHT LOWER QUADRANT ABDOMINAL PAIN: ICD-10-CM

## 2022-09-26 DIAGNOSIS — J02.9 SORE THROAT: Primary | ICD-10-CM

## 2022-09-26 LAB
EXPIRATION DATE: NORMAL
EXPIRATION DATE: NORMAL
INTERNAL CONTROL: NORMAL
INTERNAL CONTROL: NORMAL
Lab: NORMAL
Lab: NORMAL
S PYO AG THROAT QL: NEGATIVE
SARS-COV-2 AG UPPER RESP QL IA.RAPID: NOT DETECTED

## 2022-09-26 PROCEDURE — 25010000002 IOPAMIDOL 61 % SOLUTION: Performed by: NURSE PRACTITIONER

## 2022-09-26 PROCEDURE — 87880 STREP A ASSAY W/OPTIC: CPT | Performed by: NURSE PRACTITIONER

## 2022-09-26 PROCEDURE — 87426 SARSCOV CORONAVIRUS AG IA: CPT | Performed by: NURSE PRACTITIONER

## 2022-09-26 PROCEDURE — 99214 OFFICE O/P EST MOD 30 MIN: CPT | Performed by: NURSE PRACTITIONER

## 2022-09-26 PROCEDURE — 74177 CT ABD & PELVIS W/CONTRAST: CPT

## 2022-09-26 RX ADMIN — IOPAMIDOL 85 ML: 612 INJECTION, SOLUTION INTRAVENOUS at 13:56

## 2022-09-26 NOTE — PROGRESS NOTES
I spoke with patient on the phone regarding lab results.  The radiologist called me and notified me that her appendix is normal but on the upper end of normal size.  I am going to get a CBC and BMP from her.  She knows that if her abdominal pain starts to worsen again or should she become febrile, she should proceed immediately to the emergency room.  Pain is only present upon deep palpation at this time.

## 2022-09-26 NOTE — PROGRESS NOTES
"Chief Complaint  Pain With Breathing (Follow up after telephone encounter on 9/23/22. Pt states she has been have pain in different areas )    Subjective        Jana Nix presents to [facility]  History of Present Illness  Patient presents for evaluation of sore throat and abdominal pain.  This is a 27-year-old female.    3 days ago she began to have some chest tightness.  She has asthma.  She felt that she could not take a full deep breath at that time.  This has gradually waxed and waned but overall improved.    She had a temperature of 100.82 days ago accompanied by midline umbilicus pain radiating to the right lower quadrant eventually.  This improved overall yesterday but is still present and waxes and wanes.  Heightened with palpation.  She describes it as \"ranging from dull to stabbing.\"    She has a severe sore throat that she would rated 8 out of 10 pain.  Reports that her young son had a viral infection about 2 weeks ago but tested negative for strep, COVID and flu.  Increased pain with swallowing.    She endorses generalized soreness throughout the upper body.    She had a mild low-grade temperature yesterday.    Denies development of other new issues today.      Objective   Vital Signs:  /82 (BP Location: Left arm, Patient Position: Sitting, Cuff Size: Large Adult)   Pulse 118   Temp 98 °F (36.7 °C) (Infrared)   Ht 160 cm (63\")   Wt 98.9 kg (218 lb)   SpO2 98%   BMI 38.62 kg/m²   Estimated body mass index is 38.62 kg/m² as calculated from the following:    Height as of this encounter: 160 cm (63\").    Weight as of this encounter: 98.9 kg (218 lb).          Physical Exam  Vitals and nursing note reviewed.   Constitutional:       General: She is not in acute distress.     Appearance: Normal appearance. She is well-developed. She is not ill-appearing, toxic-appearing or diaphoretic.   HENT:      Head: Normocephalic and atraumatic.      Mouth/Throat:      " Pharynx: Posterior oropharyngeal erythema present.      Tonsils: 3+ on the right. 3+ on the left.   Eyes:      Pupils: Pupils are equal, round, and reactive to light.   Cardiovascular:      Rate and Rhythm: Normal rate and regular rhythm.      Pulses: Normal pulses.      Heart sounds: Normal heart sounds.   Pulmonary:      Effort: Pulmonary effort is normal. No respiratory distress.      Breath sounds: Normal breath sounds. No stridor. No wheezing, rhonchi or rales.   Chest:      Chest wall: No tenderness.   Abdominal:      General: Bowel sounds are normal. There is no distension.      Palpations: Abdomen is soft. There is no mass.      Tenderness: There is abdominal tenderness (  Right lower quadrant to deep palpation). There is no right CVA tenderness, left CVA tenderness, guarding or rebound.      Hernia: No hernia is present.   Musculoskeletal:         General: Normal range of motion.      Cervical back: Normal range of motion and neck supple.   Skin:     General: Skin is warm and dry.      Capillary Refill: Capillary refill takes less than 2 seconds.   Neurological:      General: No focal deficit present.      Mental Status: She is alert and oriented to person, place, and time. Mental status is at baseline.   Psychiatric:         Mood and Affect: Mood normal.         Behavior: Behavior normal.         Thought Content: Thought content normal.         Judgment: Judgment normal.        Result Review :  The following data was reviewed by: SYLVIA Royal on 09/26/2022:  Common labs    Common Labs 12/23/21 12/23/21 12/23/21    1327 1327 1327   Glucose  106 (A)    BUN  10    Creatinine  0.74    eGFR Non  Am  112    eGFR African Am  129    Sodium  139    Potassium  4.4    Chloride  102    Calcium  9.7    Total Protein  7.2    Albumin  4.7    Total Bilirubin  0.3    Alkaline Phosphatase  109    AST (SGOT)  10    ALT (SGPT)  12    WBC 10.8     Hemoglobin 14.4     Hematocrit 41.7     Platelets 297     Total  Cholesterol   166   Triglycerides   101   HDL Cholesterol   49   LDL Cholesterol    99   (A) Abnormal value       Comments are available for some flowsheets but are not being displayed.           Current outpatient and discharge medications have been reconciled for the patient.  Reviewed by: SYLVIA Royal        Lab Results   Component Value Date    RAPSCRN Negative 09/26/2022          Assessment and Plan   Diagnoses and all orders for this visit:    1. Sore throat (Primary)  -     POCT rapid strep A  -     POCT SARS-CoV-2 Antigen JLUIS  -     phenol (CHLORASEPTIC) 1.4 % liquid liquid; Apply 1 spray to the mouth or throat Every 2 (Two) Hours As Needed (Sore throat).  Dispense: 118 mL; Refill: 0    2. Right lower quadrant abdominal pain  -     CT Abdomen Pelvis With Contrast      Due to the location and nature of the abdominal discomfort I believe we need to urgently rule out appendicitis and I ordered a CT of the abdomen and pelvis with contrast stat today.    Rapid strep, COVID tests are negative today.  Viral URI is the likely contributor to the pharyngitis and I sent Chloraseptic spray to help ease this discomfort.    Her pain with inspiration has diminished.  She knows that at any point if this return she should proceed to the emergency room for evaluation emergently.  Discussed differential diagnoses.  Viral respiratory infection may be contributing but if this returns we will need to rule out PE.      We will contact patient with results and further recommendations.  Follow-up as needed and I will see her back at next scheduled office visit.       Follow Up   No follow-ups on file.  Patient was given instructions and counseling regarding her condition or for health maintenance advice. Please see specific information pulled into the AVS if appropriate.

## 2022-10-06 ENCOUNTER — TELEMEDICINE (OUTPATIENT)
Dept: INTERNAL MEDICINE | Facility: CLINIC | Age: 27
End: 2022-10-06

## 2022-10-06 DIAGNOSIS — F41.9 ANXIETY: Primary | ICD-10-CM

## 2022-10-06 PROCEDURE — 99213 OFFICE O/P EST LOW 20 MIN: CPT | Performed by: NURSE PRACTITIONER

## 2022-10-06 RX ORDER — ESCITALOPRAM OXALATE 10 MG/1
10 TABLET ORAL DAILY
Qty: 90 TABLET | Refills: 0 | Status: SHIPPED | OUTPATIENT
Start: 2022-10-06

## 2022-10-06 NOTE — PROGRESS NOTES
"Chief Complaint  Anxiety (1 mo f/u)  You have chosen to receive care through a telehealth visit.  Do you consent to use a video/audio connection for your medical care today? Yes    Subjective        Jana Nix presents to [facility]  History of Present Illness  Patient is in a secure location in her car on a personal electronic device alone and I am at the office.    This patient presents for a 1 month follow-up on anxiety.  This is a 27-year-old female who I saw on 9/1/2022.  At that point we discussed increasing anxiety related to life stressors and I added Lexapro 5 mg daily and hydroxyzine 12.5 to 25 mg 3 times daily as needed for acute anxiety.  She presents today for follow-up stating that the hydroxyzine does help but it makes her very tired and she has to take it when she is at home only.  She is not sure if the Lexapro is making any difference.  She still has a lot of stress in her life and thinks this is contributing to keeping the anxiety level high.  No SI/HI.  At times she has some chest discomfort that seems to potentially be associated with times of increased anxiety.  She denies development of other new issues today.      Objective   Vital Signs:  There were no vitals taken for this visit.  Estimated body mass index is 38.62 kg/m² as calculated from the following:    Height as of 9/26/22: 160 cm (63\").    Weight as of 9/26/22: 98.9 kg (218 lb).          Physical Exam  Vitals reviewed: This visit was virtual so no physical exam was performed.        Result Review :  The following data was reviewed by: SYLVIA Royal on 10/06/2022:  Common labs    Common Labs 12/23/21 12/23/21 12/23/21 9/27/22 9/27/22    1327 1327 1327 1405 1405   Glucose  106 (A)   111 (A)   BUN  10   10   Creatinine  0.74   0.83   eGFR Non  Am  112      eGFR African Am  129      Sodium  139   140   Potassium  4.4   4.1   Chloride  102   104   Calcium  9.7   9.3   Total Protein  7.2    "   Albumin  4.7      Total Bilirubin  0.3      Alkaline Phosphatase  109      AST (SGOT)  10      ALT (SGPT)  12      WBC 10.8   6.64    Hemoglobin 14.4   13.7    Hematocrit 41.7   40.4    Platelets 297   242    Total Cholesterol   166     Triglycerides   101     HDL Cholesterol   49     LDL Cholesterol    99     (A) Abnormal value       Comments are available for some flowsheets but are not being displayed.           Current outpatient and discharge medications have been reconciled for the patient.  Reviewed by: SYLVIA Royal    Telemedicine with Idalia Francisco APRN (09/01/2022)           Assessment and Plan   Diagnoses and all orders for this visit:    1. Anxiety (Primary)  -     escitalopram (Lexapro) 10 MG tablet; Take 1 tablet by mouth Daily.  Dispense: 90 tablet; Refill: 0      Improving slightly but still uncontrolled.  I will increase Lexapro to 10 mg daily, continue hydroxyzine 3 times daily as needed, discussed using 1/2 tablet, 12.5 mg 3 times daily as needed to help minimize fatigue associated with this medication.  She will use caution with it.    I am going to see her in the office in person next week for a physical exam/diagnostic testing and possible labs.  She knows that if she develops any acute chest discomfort or abdominal discomfort, shortness of breath she is to proceed to the ER.    Follow-up as needed and at next scheduled office visit.       Follow Up   No follow-ups on file.  Patient was given instructions and counseling regarding her condition or for health maintenance advice. Please see specific information pulled into the AVS if appropriate.

## 2022-10-10 ENCOUNTER — OFFICE VISIT (OUTPATIENT)
Dept: INTERNAL MEDICINE | Facility: CLINIC | Age: 27
End: 2022-10-10

## 2022-10-10 ENCOUNTER — HOSPITAL ENCOUNTER (OUTPATIENT)
Dept: GENERAL RADIOLOGY | Facility: HOSPITAL | Age: 27
Discharge: HOME | End: 2022-10-10
Admitting: NURSE PRACTITIONER

## 2022-10-10 VITALS
WEIGHT: 219 LBS | DIASTOLIC BLOOD PRESSURE: 78 MMHG | OXYGEN SATURATION: 97 % | SYSTOLIC BLOOD PRESSURE: 100 MMHG | HEART RATE: 81 BPM | HEIGHT: 63 IN | TEMPERATURE: 93.5 F | BODY MASS INDEX: 38.8 KG/M2

## 2022-10-10 DIAGNOSIS — J45.41 MODERATE PERSISTENT ASTHMA WITH EXACERBATION: ICD-10-CM

## 2022-10-10 DIAGNOSIS — R07.9 RIGHT-SIDED CHEST PAIN: Primary | ICD-10-CM

## 2022-10-10 DIAGNOSIS — R93.5 ABNORMAL CT OF THE ABDOMEN: ICD-10-CM

## 2022-10-10 DIAGNOSIS — R10.31 RIGHT LOWER QUADRANT PAIN: ICD-10-CM

## 2022-10-10 DIAGNOSIS — R07.9 RIGHT-SIDED CHEST PAIN: ICD-10-CM

## 2022-10-10 PROCEDURE — 71046 X-RAY EXAM CHEST 2 VIEWS: CPT

## 2022-10-10 PROCEDURE — 99214 OFFICE O/P EST MOD 30 MIN: CPT | Performed by: NURSE PRACTITIONER

## 2022-10-10 PROCEDURE — 93000 ELECTROCARDIOGRAM COMPLETE: CPT | Performed by: NURSE PRACTITIONER

## 2022-10-10 RX ORDER — METHYLPREDNISOLONE 4 MG/1
TABLET ORAL
Qty: 21 TABLET | Refills: 0 | Status: SHIPPED | OUTPATIENT
Start: 2022-10-10

## 2022-10-10 NOTE — PROGRESS NOTES
"Chief Complaint  Abdominal Pain and Chest Pain    Subjective        Jana Nix presents to [facility]  History of Present Illness  Pt presents to evaluate intermittent right sided chest discomfort and RUQ/RLQ pain. This is a 27 YOF with hx asthma.     This began off and on about 2-3 weeks ago. She had a CT abd/pelvis 9/26/22 showing \"upper level of normal\" appendix. She states that this hasn't worsened but still intermittently present as far as the abd pain. No changes in bowel or bladder function. She endorses right sided chest discomfort off and on that seems to be correlated with times of high stress. She does have YOKO and has had a lot of life stressors recently as well as currently in nursing school. No outright SOA concurrently but has been having to increase albuterol use over the past couple of weeks- she does respond well to the albuterol. No new cough/congestion or fevers/chills.     Otherwise denies development of other new issues today.      Objective   Vital Signs:  /78 (BP Location: Left arm, Patient Position: Sitting, Cuff Size: Large Adult)   Pulse 81   Temp 93.5 °F (34.2 °C) (Infrared)   Ht 160 cm (63\")   Wt 99.3 kg (219 lb)   SpO2 97%   BMI 38.79 kg/m²   Estimated body mass index is 38.79 kg/m² as calculated from the following:    Height as of this encounter: 160 cm (63\").    Weight as of this encounter: 99.3 kg (219 lb).          Physical Exam  Vitals and nursing note reviewed.   Constitutional:       Appearance: Normal appearance. She is well-developed. She is obese.   HENT:      Head: Normocephalic and atraumatic.      Right Ear: External ear normal.      Left Ear: External ear normal.   Eyes:      Pupils: Pupils are equal, round, and reactive to light.   Cardiovascular:      Rate and Rhythm: Normal rate and regular rhythm.      Pulses: Normal pulses.      Heart sounds: Normal heart sounds.   Pulmonary:      Effort: Pulmonary effort is normal. No " respiratory distress.      Breath sounds: No stridor. Wheezing (  bilat upper lobes) present. No rhonchi or rales.   Chest:      Chest wall: No tenderness.   Abdominal:      General: Bowel sounds are normal. There is no distension.      Palpations: Abdomen is soft. There is no mass.      Tenderness: There is no abdominal tenderness. There is no right CVA tenderness, left CVA tenderness, guarding or rebound.      Hernia: No hernia is present.   Musculoskeletal:         General: Normal range of motion.      Cervical back: Normal range of motion and neck supple.   Skin:     General: Skin is warm and dry.      Capillary Refill: Capillary refill takes less than 2 seconds.   Neurological:      General: No focal deficit present.      Mental Status: She is alert and oriented to person, place, and time. Mental status is at baseline.   Psychiatric:         Mood and Affect: Mood normal.         Behavior: Behavior normal.         Thought Content: Thought content normal.         Judgment: Judgment normal.        Result Review :  The following data was reviewed by: SYLVIA Royal on 10/10/2022:  Common labs    Common Labs 12/23/21 12/23/21 12/23/21 9/27/22 9/27/22    1327 1327 1327 1405 1405   Glucose  106 (A)   111 (A)   BUN  10   10   Creatinine  0.74   0.83   eGFR Non  Am  112      eGFR African Am  129      Sodium  139   140   Potassium  4.4   4.1   Chloride  102   104   Calcium  9.7   9.3   Total Protein  7.2      Albumin  4.7      Total Bilirubin  0.3      Alkaline Phosphatase  109      AST (SGOT)  10      ALT (SGPT)  12      WBC 10.8   6.64    Hemoglobin 14.4   13.7    Hematocrit 41.7   40.4    Platelets 297   242    Total Cholesterol   166     Triglycerides   101     HDL Cholesterol   49     LDL Cholesterol    99     (A) Abnormal value       Comments are available for some flowsheets but are not being displayed.           Current outpatient and discharge medications have been reconciled for the  patient.  Reviewed by: SYLVIA Royal      ECG 12 Lead    Date/Time: 10/10/2022 3:36 PM  Performed by: Idalia Francisco APRN  Authorized by: Idalia Francisco APRN   Comparison: compared with previous ECG from 12/18/2018  Similar to previous ECG  Rhythm: sinus rhythm  Rate: normal  Conduction: conduction normal  QRS axis: normal  Other: no other findings    Clinical impression: normal ECG              Assessment and Plan   Diagnoses and all orders for this visit:    1. Right-sided chest pain (Primary)  -     XR Chest PA & Lateral; Future  -     ECG 12 Lead    2. Moderate persistent asthma with exacerbation  -     XR Chest PA & Lateral; Future  -     methylPREDNISolone (MEDROL) 4 MG dose pack; Take as directed on package instructions.  Dispense: 21 tablet; Refill: 0    3. Abnormal CT of the abdomen  -     Ambulatory Referral to General Surgery    4. Right lower quadrant pain  -     Ambulatory Referral to General Surgery      I will treat for acute asthma exacerbation with Medrol Dosepak and she is encouraged to use her Breo for maintenance, albuterol as needed.  Chest x-ray today to ensure clear lung fields.  If chest x-ray is normal and discomfort/shortness of breath persists despite above-mentioned interventions I will order CT angiogram.    I would like to refer urgently to general surgery for evaluation of her right lower quadrant pain in the setting of her recent CT abdomen which showed the appendix on the upper level of normal.  Urgent referral is placed.    Regarding her above-mentioned symptoms, if any of her symptoms acutely worsen she knows to proceed directly to the ER for evaluation.    Follow-up as needed, at next scheduled office visit and we will contact her with results and any further recommendations.       Follow Up   No follow-ups on file.  Patient was given instructions and counseling regarding her condition or for health maintenance advice. Please see specific information pulled into the AVS  if appropriate.

## 2022-10-17 ENCOUNTER — OFFICE VISIT (OUTPATIENT)
Dept: SURGERY | Facility: CLINIC | Age: 27
End: 2022-10-17

## 2022-10-17 VITALS — WEIGHT: 220 LBS | HEIGHT: 63 IN | BODY MASS INDEX: 38.98 KG/M2

## 2022-10-17 DIAGNOSIS — R10.31 ABDOMINAL PAIN, RLQ: Primary | ICD-10-CM

## 2022-10-17 PROCEDURE — 99203 OFFICE O/P NEW LOW 30 MIN: CPT | Performed by: SURGERY

## 2022-10-17 NOTE — PROGRESS NOTES
Cc: Intermittent right-sided abdominal pain, abnormal CT    History of presenting illness:   This is a quite nice 27-year-old female who was had about 1 month of episodes of intermittent right-sided abdominal pain.  The first time it happened it lasted almost a full day.  Since then she has had shorter episodes which seem to get better if she gets up and moves around.  The pain does not seem to radiate.  No significant nausea or vomiting associated with it and nothing that seems to bring it on.  She had a CT and the CT was read as possible borderline dilated appendix, no evidence of acute appendicitis.    Past Medical History: Depression, asthma    Past Surgical History:  section x2    Medications: Albuterol, Lexapro, Breo Ellipta, Atarax, Singulair    Allergies: None known non-smoker, she has had some increased stress as her father was seriously injured and has required quite a lot of care    Social History: Non-smoker    Family History: Negative for colorectal cancer    Review of Systems:  Constitutional: Negative for fever, chills, change in weight  Neck: no swollen glands or dysphagia or odynophagia  Respiratory: negative for SOB, cough, hemoptysis or wheezing  Cardiovascular: negative for chest pain, palpitations or peripheral edema  Gastrointestinal: Positive for abdominal pain, denies nausea or vomiting      Physical Exam:  BMI: 38.9  General: alert and oriented, appropriate, no acute distress  Eyes: No scleral icterus, extraocular movements are intact  Neck: Supple without lymphadenopathy or thyromegaly, trachea is in the midline  Respiratory: There is good bilateral chest expansion, no use of accessory muscles is noted  Cardiovascular: No jugular venous distention or peripheral edema is seen  Gastrointestinal: Soft and benign, no tenderness, no guarding, no mass, no hernia is felt    Laboratory data: Recent labs demonstrate a normal white count of 6.6, no left shift, hemoglobin normal, chemistry  unremarkable    Imaging data: CT of the abdomen and pelvis reviewed by me.  The appendix is measured at 6 mm.  There is no inflammation.  There appears to be air within the appendix.      Assessment and plan:   -Right-sided abdominal pain, intermittent, etiology unclear  -CT is read as being borderline dilated appendix, in my view the appendix appears normal and in light of the fact that there is air within the appendix, and her clinical exam is benign, certainly no acute appendicitis.  -I have seen people, frequently young women in their 20s with chronic right lower quadrant pain who have improved with appendectomy, but at the moment, I would recommend observation.  If symptoms persist, laparoscopy and appendectomy could be considered.  May be worthwhile to work-up her gallbladder prior to proceeding with any other surgical intervention, although her symptoms are really not typical for that of biliary disease.  She may call back if she has further or recurrent symptoms.      Siva Rodriguez MD, FACS  General, Minimally Invasive and Endoscopic Surgery  [facility], [place], operated by [facility]    [address], [address]  [place], [place], [address]  P: [phone]  F: [phone]

## 2022-10-28 ENCOUNTER — CLINICAL SUPPORT (OUTPATIENT)
Dept: INTERNAL MEDICINE | Facility: CLINIC | Age: 27
End: 2022-10-28

## 2022-10-28 DIAGNOSIS — Z23 FLU VACCINE NEED: ICD-10-CM

## 2022-10-28 PROCEDURE — 90471 IMMUNIZATION ADMIN: CPT | Performed by: NURSE PRACTITIONER

## 2022-10-28 PROCEDURE — 90686 IIV4 VACC NO PRSV 0.5 ML IM: CPT | Performed by: NURSE PRACTITIONER

## 2022-11-22 DIAGNOSIS — J45.998 ASTHMA, PERSISTENT NOT CONTROLLED: ICD-10-CM

## 2022-11-22 RX ORDER — MONTELUKAST SODIUM 10 MG/1
TABLET ORAL
Qty: 90 TABLET | Refills: 1 | Status: SHIPPED | OUTPATIENT
Start: 2022-11-22

## 2023-05-16 ENCOUNTER — APPOINTMENT (OUTPATIENT)
Dept: CT IMAGING | Facility: HOSPITAL | Age: 28
End: 2023-05-16
Payer: COMMERCIAL

## 2023-05-16 ENCOUNTER — APPOINTMENT (OUTPATIENT)
Dept: ULTRASOUND IMAGING | Facility: HOSPITAL | Age: 28
End: 2023-05-16
Payer: COMMERCIAL

## 2023-05-16 ENCOUNTER — HOSPITAL ENCOUNTER (EMERGENCY)
Facility: HOSPITAL | Age: 28
Discharge: HOME | End: 2023-05-16
Attending: EMERGENCY MEDICINE | Admitting: EMERGENCY MEDICINE
Payer: COMMERCIAL

## 2023-05-16 VITALS
TEMPERATURE: 98.4 F | RESPIRATION RATE: 18 BRPM | HEIGHT: 63 IN | BODY MASS INDEX: 37.92 KG/M2 | HEART RATE: 76 BPM | OXYGEN SATURATION: 96 % | SYSTOLIC BLOOD PRESSURE: 107 MMHG | DIASTOLIC BLOOD PRESSURE: 64 MMHG | WEIGHT: 214 LBS

## 2023-05-16 DIAGNOSIS — R10.31 ACUTE RIGHT LOWER QUADRANT PAIN: Primary | ICD-10-CM

## 2023-05-16 DIAGNOSIS — R63.0 DECREASED APPETITE: ICD-10-CM

## 2023-05-16 LAB
ALBUMIN SERPL-MCNC: 4.5 G/DL (ref 3.5–5.2)
ALBUMIN/GLOB SERPL: 1.7 G/DL
ALP SERPL-CCNC: 99 U/L (ref 39–117)
ALT SERPL W P-5'-P-CCNC: 13 U/L (ref 1–33)
ANION GAP SERPL CALCULATED.3IONS-SCNC: 8 MMOL/L (ref 5–15)
AST SERPL-CCNC: 16 U/L (ref 1–32)
BACTERIA UR QL AUTO: ABNORMAL /HPF
BASOPHILS # BLD AUTO: 0.02 10*3/MM3 (ref 0–0.2)
BASOPHILS NFR BLD AUTO: 0.2 % (ref 0–1.5)
BILIRUB SERPL-MCNC: 0.3 MG/DL (ref 0–1.2)
BILIRUB UR QL STRIP: NEGATIVE
BUN SERPL-MCNC: <2 MG/DL (ref 6–20)
BUN/CREAT SERPL: ABNORMAL
CALCIUM SPEC-SCNC: 9.1 MG/DL (ref 8.6–10.5)
CHLORIDE SERPL-SCNC: 105 MMOL/L (ref 98–107)
CLARITY UR: ABNORMAL
CO2 SERPL-SCNC: 25 MMOL/L (ref 22–29)
COLOR UR: YELLOW
CREAT SERPL-MCNC: 0.67 MG/DL (ref 0.57–1)
DEPRECATED RDW RBC AUTO: 38.5 FL (ref 37–54)
EGFRCR SERPLBLD CKD-EPI 2021: 122.3 ML/MIN/1.73
EOSINOPHIL # BLD AUTO: 0.73 10*3/MM3 (ref 0–0.4)
EOSINOPHIL NFR BLD AUTO: 8.1 % (ref 0.3–6.2)
ERYTHROCYTE [DISTWIDTH] IN BLOOD BY AUTOMATED COUNT: 13.2 % (ref 12.3–15.4)
GLOBULIN UR ELPH-MCNC: 2.7 GM/DL
GLUCOSE SERPL-MCNC: 85 MG/DL (ref 65–99)
GLUCOSE UR STRIP-MCNC: NEGATIVE MG/DL
HCG SERPL QL: NEGATIVE
HCT VFR BLD AUTO: 39.2 % (ref 34–46.6)
HGB BLD-MCNC: 13.5 G/DL (ref 12–15.9)
HGB UR QL STRIP.AUTO: NEGATIVE
HOLD SPECIMEN: NORMAL
HYALINE CASTS UR QL AUTO: ABNORMAL /LPF
IMM GRANULOCYTES # BLD AUTO: 0.03 10*3/MM3 (ref 0–0.05)
IMM GRANULOCYTES NFR BLD AUTO: 0.3 % (ref 0–0.5)
KETONES UR QL STRIP: NEGATIVE
LEUKOCYTE ESTERASE UR QL STRIP.AUTO: ABNORMAL
LIPASE SERPL-CCNC: 19 U/L (ref 13–60)
LYMPHOCYTES # BLD AUTO: 2.4 10*3/MM3 (ref 0.7–3.1)
LYMPHOCYTES NFR BLD AUTO: 26.7 % (ref 19.6–45.3)
MCH RBC QN AUTO: 27.8 PG (ref 26.6–33)
MCHC RBC AUTO-ENTMCNC: 34.4 G/DL (ref 31.5–35.7)
MCV RBC AUTO: 80.8 FL (ref 79–97)
MONOCYTES # BLD AUTO: 0.62 10*3/MM3 (ref 0.1–0.9)
MONOCYTES NFR BLD AUTO: 6.9 % (ref 5–12)
NEUTROPHILS NFR BLD AUTO: 5.2 10*3/MM3 (ref 1.7–7)
NEUTROPHILS NFR BLD AUTO: 57.8 % (ref 42.7–76)
NITRITE UR QL STRIP: NEGATIVE
NRBC BLD AUTO-RTO: 0 /100 WBC (ref 0–0.2)
PH UR STRIP.AUTO: 6.5 [PH] (ref 5–8)
PLATELET # BLD AUTO: 275 10*3/MM3 (ref 140–450)
PMV BLD AUTO: 11.2 FL (ref 6–12)
POTASSIUM SERPL-SCNC: 3.7 MMOL/L (ref 3.5–5.2)
PROT SERPL-MCNC: 7.2 G/DL (ref 6–8.5)
PROT UR QL STRIP: NEGATIVE
RBC # BLD AUTO: 4.85 10*6/MM3 (ref 3.77–5.28)
RBC # UR STRIP: ABNORMAL /HPF
REF LAB TEST METHOD: ABNORMAL
SODIUM SERPL-SCNC: 138 MMOL/L (ref 136–145)
SP GR UR STRIP: 1.02 (ref 1–1.03)
SQUAMOUS #/AREA URNS HPF: ABNORMAL /HPF
UROBILINOGEN UR QL STRIP: ABNORMAL
WBC # UR STRIP: ABNORMAL /HPF
WBC NRBC COR # BLD: 9 10*3/MM3 (ref 3.4–10.8)
WHOLE BLOOD HOLD COAG: NORMAL
WHOLE BLOOD HOLD SPECIMEN: NORMAL

## 2023-05-16 PROCEDURE — 93976 VASCULAR STUDY: CPT

## 2023-05-16 PROCEDURE — 80053 COMPREHEN METABOLIC PANEL: CPT

## 2023-05-16 PROCEDURE — 25010000002 MORPHINE PER 10 MG: Performed by: EMERGENCY MEDICINE

## 2023-05-16 PROCEDURE — 25510000001 IOPAMIDOL 61 % SOLUTION: Performed by: EMERGENCY MEDICINE

## 2023-05-16 PROCEDURE — 83690 ASSAY OF LIPASE: CPT

## 2023-05-16 PROCEDURE — 76830 TRANSVAGINAL US NON-OB: CPT

## 2023-05-16 PROCEDURE — 25010000002 ONDANSETRON PER 1 MG: Performed by: PHYSICIAN ASSISTANT

## 2023-05-16 PROCEDURE — 84703 CHORIONIC GONADOTROPIN ASSAY: CPT

## 2023-05-16 PROCEDURE — 96361 HYDRATE IV INFUSION ADD-ON: CPT

## 2023-05-16 PROCEDURE — 96375 TX/PRO/DX INJ NEW DRUG ADDON: CPT

## 2023-05-16 PROCEDURE — 81001 URINALYSIS AUTO W/SCOPE: CPT

## 2023-05-16 PROCEDURE — 76856 US EXAM PELVIC COMPLETE: CPT

## 2023-05-16 PROCEDURE — 99283 EMERGENCY DEPT VISIT LOW MDM: CPT

## 2023-05-16 PROCEDURE — 25010000002 KETOROLAC TROMETHAMINE PER 15 MG: Performed by: EMERGENCY MEDICINE

## 2023-05-16 PROCEDURE — 96374 THER/PROPH/DIAG INJ IV PUSH: CPT

## 2023-05-16 PROCEDURE — 74177 CT ABD & PELVIS W/CONTRAST: CPT

## 2023-05-16 PROCEDURE — 85025 COMPLETE CBC W/AUTO DIFF WBC: CPT | Performed by: EMERGENCY MEDICINE

## 2023-05-16 RX ORDER — MORPHINE SULFATE 2 MG/ML
4 INJECTION, SOLUTION INTRAMUSCULAR; INTRAVENOUS ONCE
Status: COMPLETED | OUTPATIENT
Start: 2023-05-16 | End: 2023-05-16

## 2023-05-16 RX ORDER — ONDANSETRON 2 MG/ML
4 INJECTION INTRAMUSCULAR; INTRAVENOUS ONCE
Status: COMPLETED | OUTPATIENT
Start: 2023-05-16 | End: 2023-05-16

## 2023-05-16 RX ORDER — KETOROLAC TROMETHAMINE 15 MG/ML
15 INJECTION, SOLUTION INTRAMUSCULAR; INTRAVENOUS ONCE
Status: COMPLETED | OUTPATIENT
Start: 2023-05-16 | End: 2023-05-16

## 2023-05-16 RX ORDER — SODIUM CHLORIDE 0.9 % (FLUSH) 0.9 %
10 SYRINGE (ML) INJECTION AS NEEDED
Status: DISCONTINUED | OUTPATIENT
Start: 2023-05-16 | End: 2023-05-16 | Stop reason: HOSPADM

## 2023-05-16 RX ORDER — TRAMADOL HYDROCHLORIDE 50 MG/1
50 TABLET ORAL EVERY 6 HOURS PRN
Qty: 10 TABLET | Refills: 0 | Status: SHIPPED | OUTPATIENT
Start: 2023-05-16

## 2023-05-16 RX ADMIN — MORPHINE SULFATE 4 MG: 2 INJECTION, SOLUTION INTRAMUSCULAR; INTRAVENOUS at 11:24

## 2023-05-16 RX ADMIN — KETOROLAC TROMETHAMINE 15 MG: 15 INJECTION, SOLUTION INTRAMUSCULAR; INTRAVENOUS at 13:39

## 2023-05-16 RX ADMIN — IOPAMIDOL 85 ML: 612 INJECTION, SOLUTION INTRAVENOUS at 12:13

## 2023-05-16 RX ADMIN — SODIUM CHLORIDE 500 ML: 9 INJECTION, SOLUTION INTRAVENOUS at 11:24

## 2023-05-16 RX ADMIN — ONDANSETRON 4 MG: 2 INJECTION INTRAMUSCULAR; INTRAVENOUS at 11:24

## 2023-05-16 NOTE — ED PROVIDER NOTES
MD ATTESTATION NOTE    The [facility] and I have discussed this patient's history, physical exam, and treatment plan.  I have reviewed the documentation and personally had a face to face interaction with the patient. I affirm the documentation and agree with the treatment and plan.  The attached note describes my personal findings.      I provided a substantive portion of the care of the patient.  I personally performed the physical exam in its entirety, and below are my findings.  For this patient encounter, the patient wore surgical mask, I wore full protective PPE including N95 and eye protection.      Brief HPI: Patient complains of right lower quadrant pain since yesterday.  She reports nausea and decreased appetite.  Denies vomiting, diarrhea, constipation, dysuria, or fever.    PHYSICAL EXAM  ED Triage Vitals   Temp Heart Rate Resp BP SpO2   05/16/23 1018 05/16/23 1018 05/16/23 1018 05/16/23 1042 05/16/23 1018   98.4 °F (36.9 °C) 84 16 118/83 95 %      Temp src Heart Rate Source Patient Position BP Location FiO2 (%)   05/16/23 1018 05/16/23 1018 -- -- --   Tympanic Monitor            GENERAL: Awake, alert, oriented x3.  Well-developed female.  Resting comfortably in no acute distress.  BMI 37.9   HENT: nares patent  EYES: no scleral icterus  CV: regular rhythm, normal rate  RESPIRATORY: normal effort  ABDOMEN: soft, there is right lower quadrant tenderness, no rebound or guarding, no CVA tenderness  MUSCULOSKELETAL: no deformity  NEURO: alert, moves all extremities, follows commands  PSYCH:  calm, cooperative  SKIN: warm, dry    Vital signs and nursing notes reviewed.        Plan: Obtain labs and CT abdomen/pelvis.    ED Course as of 05/16/23 1818   Tue May 16, 2023   1159 HCG Qualitative: Negative [WH]   1159 WBC: 9.00 [WH]   1159 Hemoglobin: 13.5 [WH]   1241 CT abdomen/pelvis personally interpreted by me.  My personal interpretation is: Lung bases are clear.  No bowel obstruction.  No obstructive uropathy. [WH]    1243 Leukocytes, UA(!): Moderate (2+) [WH]   1244 WBC, UA(!): 21-30 [WH]   1244 Bacteria, UA(!): 2+ [WH]   1244 Squamous Epithelial Cells, UA(!): 7-12 [WH]   1332 Test results discussed with the patient.  She is still complaining of right lower quadrant discomfort.  Labs and CT are unremarkable.  She does not have any urinary complaints I doubt she has a UTI.  Patient will be given a dose of Toradol.  Will obtain a pelvic ultrasound for further evaluation. [WH]   1629 Pelvic ultrasound shows normal vascularity to both ovaries.  Uterus is normal.  No fibroids are seen.  There is an IUD in place.  There is small focus of fluid in the endocervical canal. [WH]   1705 Ultrasound results discussed with the patient.  She is resting comfortably but is still complaining of pain.  She does not have an acute abdomen.  She will be discharged.  She was advised follow-up with her PCP.  Return precautions were discussed.  She will be given a prescription for short course of pain medication. [WH]   1707 YOLI query complete. Treatment plan to include limited course of prescribed  controlled substance. Risks including addiction, benefits, and alternatives presented to patient.    [WH]      ED Course User Index  [WH] Zeke Chaidez MD      Diagnosis Plan   1. Acute right lower quadrant pain  traMADol (ULTRAM) 50 MG tablet      2. Decreased appetite          DISCHARGE    Patient discharged in stable condition.    Reviewed implications of results, diagnosis, meds, responsibility to follow up, warning signs and symptoms of possible worsening, potential complications and reasons to return to ER, including worsening pain, vomiting, fever, diarrhea, or other concern.    Patient/Family voiced understanding of above instructions.    Discussed plan for discharge, as there is no emergent indication for admission. Patient referred to primary care provider for BP management due to today's BP. Pt/family is agreeable and understands need  for follow up and repeat testing.  Pt is aware that discharge does not mean that nothing is wrong but it indicates no emergency is present that requires admission and they must continue care with follow-up as given below or physician of their choice.     FOLLOW-UP  Idalia Francisco APRN  [address] [facility] [address] [phone]    Schedule an appointment as soon as possible for a visit            Medication List      New Prescriptions    traMADol 50 MG tablet  Commonly known as: ULTRAM  Take 1 tablet by mouth Every 6 (Six) Hours As Needed for Moderate Pain.        Changed    methylPREDNISolone 4 MG dose pack  Commonly known as: MEDROL  Take as directed on package instructions.  What changed: additional instructions           Where to Get Your Medications      These medications were sent to [id number] - [place] [id number] [place] [address] [address] OF [place] [address] & [facility] [address] - [phone]  - [phone] [facility] [facility] [address], [facility] [address]    Phone: [phone]   · traMADol 50 MG tablet            Zeke Chaidez MD  05/16/23 3056

## 2023-05-16 NOTE — DISCHARGE INSTRUCTIONS
Take medication as prescribed.  Follow-up with your primary care provider soon as possible.  Return to the emergency department for worsening pain, fever, chills, vomiting, diarrhea, or other concern

## 2023-05-16 NOTE — ED PROVIDER NOTES
EMERGENCY DEPARTMENT ENCOUNTER    Room Number:    Date of encounter:  2023  PCP: Idalia Francisco APRN  Historian: Patient  Full history not obtainable due to: None    HPI:  Chief Complaint: Abdominal pain    Context: Jana Nix is a 28 y.o. female with a PMH significant for asthma, migraine who presents to the ED c/o right lower quadrant abdominal pain which she describes as sharp and nonradiating since yesterday morning.  The patient had a similar occurrence last  and had an abnormal CT scan at that time showing a mildly enlarged appendix but after following up with general surgery they made the decision to observe and not proceed with appendectomy.  She had no further symptoms after her general surgery evaluation until yesterday morning.  She has been using ibuprofen with some mild relief.  Admits to nausea but denies vomiting.  No change to bowel habits or urination.  No vaginal or pelvic symptoms.      MEDICAL RECORD REVIEW:    Upon review of the medical record it appears the patient was evaluated in the office with general surgery on 10/17/2022 for right lower quadrant pain.  She had a normal lipid panel on 2021 and a normal CBC on that date as well.    PAST MEDICAL HISTORY    Active Ambulatory Problems     Diagnosis Date Noted   • Mild persistent asthma without complication 10/26/2018   • Asthma 02/15/2014   • Palpitations 2015   • Depressive disorder 2020   • Ventricular premature beats 10/25/2017   • Healthcare maintenance 2021   • Migraine 2021     Resolved Ambulatory Problems     Diagnosis Date Noted   • Previous  delivery affecting pregnancy 10/01/2018   • Pre-eclampsia, antepartum 10/01/2018   • Maternal atypical antibody complicating pregnancy 10/03/2018   • Pre-eclampsia, antepartum 10/01/2018   • H/O  section 2014   • Pregnancy 2019   • Elevated glucose 2019   • Hx of preeclampsia, prior pregnancy, currently pregnant  2019   • Antepartum anemia 2019   • Maternal atypical antibody complicating pregnancy in third trimester 2019   • History of postpartum depression 2019   •  delivery delivered 2019     Past Medical History:   Diagnosis Date   • Allergic rhinitis    • Anxiety    • Chlamydia    • Community acquired pneumonia    • Depression    • Mild dehydration    • PVC (premature ventricular contraction)    • Sinus tachycardia    • Streptococcal pharyngitis          PAST SURGICAL HISTORY  Past Surgical History:   Procedure Laterality Date   •  SECTION N/A     3 total   •  SECTION N/A 2019    Procedure:  SECTION REPEAT;  Surgeon: Angeline Patel MD;  Location: [facility] LABOR DELIVERY;  Service: Obstetrics/Gynecology         FAMILY HISTORY  Family History   Problem Relation Age of Onset   • Hypertension Mother    • Clotting disorder Paternal Grandfather    • Breast cancer Other    • Myocarditis Maternal Grandfather          SOCIAL HISTORY  Social History     Socioeconomic History   • Marital status: Single   Tobacco Use   • Smoking status: Never   • Smokeless tobacco: Never   • Tobacco comments:     caffeine - rarely   Vaping Use   • Vaping Use: Never used   Substance and Sexual Activity   • Alcohol use: No     Comment: socially   • Drug use: No   • Sexual activity: Yes     Partners: Male     Birth control/protection: OCP         ALLERGIES  Patient has no known allergies.        REVIEW OF SYSTEMS    All systems reviewed and marked as negative except as listed in HPI     PHYSICAL EXAM    I have reviewed the triage vital signs and nursing notes.    ED Triage Vitals   Temp Heart Rate Resp BP SpO2   23 1018 23 1018 23 1018 23 1042 23 1018   98.4 °F (36.9 °C) 84 16 118/83 95 %      Temp src Heart Rate Source Patient Position BP Location FiO2 (%)   23 1018 23 1018 -- -- --   Tympanic Monitor          Physical  Exam  Constitutional:       General: She is not in acute distress.     Appearance: She is well-developed.   HENT:      Head: Normocephalic and atraumatic.   Eyes:      General: No scleral icterus.     Conjunctiva/sclera: Conjunctivae normal.   Neck:      Trachea: No tracheal deviation.   Cardiovascular:      Rate and Rhythm: Normal rate and regular rhythm.   Pulmonary:      Effort: Pulmonary effort is normal.      Breath sounds: Normal breath sounds.   Abdominal:      Palpations: Abdomen is soft.      Tenderness: There is abdominal tenderness in the right lower quadrant.   Musculoskeletal:         General: No deformity.      Cervical back: Normal range of motion.   Lymphadenopathy:      Cervical: No cervical adenopathy.   Skin:     General: Skin is warm and dry.   Neurological:      Mental Status: She is alert and oriented to person, place, and time.   Psychiatric:         Behavior: Behavior normal.         Vital signs and nursing notes reviewed.            LAB RESULTS  No results found for this or any previous visit (from the past 24 hour(s)).    Ordered the above labs and independently reviewed the results.        RADIOLOGY  No Radiology Exams Resulted Within Past 24 Hours    I ordered the above noted radiological studies. Independently reviewed by me and discussed with radiologist.  See dictation above for official radiology interpretation.      PROCEDURES    Procedures        MEDICATIONS GIVEN IN ER    Medications   sodium chloride 0.9 % flush 10 mL (has no administration in time range)         PROGRESS, DATA ANALYSIS, CONSULTS, AND MEDICAL DECISION MAKING    All labs have been independently interpreted by me.  All radiology studies have been interpreted by me.  Discussion below represents my analysis of pertinent findings related to patient's condition, differential diagnosis, treatment plan and final disposition.          Orders placed during this visit:  Orders Placed This Encounter   Procedures   • Henderson  Draw   • Comprehensive Metabolic Panel   • Lipase   • Urinalysis With Microscopic If Indicated (No Culture) - Urine, Clean Catch   • hCG, Serum, Qualitative   • CBC Auto Differential   • NPO Diet NPO Type: Strict NPO   • Undress & Gown   • Insert Peripheral IV   • CBC & Differential   • Green Top (Gel)   • Lavender Top   • Light Blue Top         ED Course as of 05/17/23 1448   Tue May 16, 2023   1159 HCG Qualitative: Negative [WH]   1159 WBC: 9.00 [WH]   1159 Hemoglobin: 13.5 [WH]   1241 CT abdomen/pelvis personally interpreted by me.  My personal interpretation is: Lung bases are clear.  No bowel obstruction.  No obstructive uropathy. [WH]   1243 Leukocytes, UA(!): Moderate (2+) [WH]   1244 WBC, UA(!): 21-30 [WH]   1244 Bacteria, UA(!): 2+ [WH]   1244 Squamous Epithelial Cells, UA(!): 7-12 [WH]   1332 Test results discussed with the patient.  She is still complaining of right lower quadrant discomfort.  Labs and CT are unremarkable.  She does not have any urinary complaints I doubt she has a UTI.  Patient will be given a dose of Toradol.  Will obtain a pelvic ultrasound for further evaluation. [WH]   1629 Pelvic ultrasound shows normal vascularity to both ovaries.  Uterus is normal.  No fibroids are seen.  There is an IUD in place.  There is small focus of fluid in the endocervical canal. [WH]   1705 Ultrasound results discussed with the patient.  She is resting comfortably but is still complaining of pain.  She does not have an acute abdomen.  She will be discharged.  She was advised follow-up with her PCP.  Return precautions were discussed.  She will be given a prescription for short course of pain medication. [WH]   1707 YOLI query complete. Treatment plan to include limited course of prescribed  controlled substance. Risks including addiction, benefits, and alternatives presented to patient.    [WH]      ED Course User Index  [WH] Zeke Chaidez MD       AS OF 11:01 EDT VITALS:    BP - 118/83  HR -  84  TEMP - 98.4 °F (36.9 °C) (Tympanic)  02 SATS - 95%      DIAGNOSIS  Final diagnoses:   Acute right lower quadrant pain   Decreased appetite         DISPOSITION  D/c    Pt masked in first look. I wore a surgical mask throughout my encounters with the pt. I performed hand hygiene on entry into the pt room and upon exit.     Dictated utilizing Dragon dictation     Note Disclaimer: At [facility], we believe that sharing information builds trust and better relationships. You are receiving this note because you recently visited [facility]. It is possible you will see health information before a provider has talked with you about it. This kind of information can be easy to misunderstand. To help you fully understand what it means for your health, we urge you to discuss this note with your provider.      Angel Luis Wu PA  05/17/23 144

## 2023-09-09 ENCOUNTER — APPOINTMENT (OUTPATIENT)
Dept: CT IMAGING | Facility: HOSPITAL | Age: 28
End: 2023-09-09
Payer: COMMERCIAL

## 2023-09-09 ENCOUNTER — HOSPITAL ENCOUNTER (OUTPATIENT)
Facility: HOSPITAL | Age: 28
Setting detail: OBSERVATION
LOS: 1 days | Discharge: HOME | End: 2023-09-12
Attending: EMERGENCY MEDICINE | Admitting: STUDENT IN AN ORGANIZED HEALTH CARE EDUCATION/TRAINING PROGRAM
Payer: COMMERCIAL

## 2023-09-09 DIAGNOSIS — R10.9 RIGHT SIDED ABDOMINAL PAIN: ICD-10-CM

## 2023-09-09 DIAGNOSIS — K35.80 ACUTE APPENDICITIS: ICD-10-CM

## 2023-09-09 DIAGNOSIS — K35.80 ACUTE APPENDICITIS, UNSPECIFIED ACUTE APPENDICITIS TYPE: Primary | ICD-10-CM

## 2023-09-09 LAB
ALBUMIN SERPL-MCNC: 4.5 G/DL (ref 3.5–5.2)
ALBUMIN/GLOB SERPL: 1.7 G/DL
ALP SERPL-CCNC: 100 U/L (ref 39–117)
ALT SERPL W P-5'-P-CCNC: 17 U/L (ref 1–33)
ANION GAP SERPL CALCULATED.3IONS-SCNC: 11 MMOL/L (ref 5–15)
AST SERPL-CCNC: 12 U/L (ref 1–32)
BACTERIA UR QL AUTO: ABNORMAL /HPF
BASOPHILS # BLD AUTO: 0.01 10*3/MM3 (ref 0–0.2)
BASOPHILS NFR BLD AUTO: 0.1 % (ref 0–1.5)
BILIRUB SERPL-MCNC: 0.2 MG/DL (ref 0–1.2)
BILIRUB UR QL STRIP: NEGATIVE
BUN SERPL-MCNC: 10 MG/DL (ref 6–20)
BUN/CREAT SERPL: 14.7 (ref 7–25)
CALCIUM SPEC-SCNC: 9.4 MG/DL (ref 8.6–10.5)
CHLORIDE SERPL-SCNC: 105 MMOL/L (ref 98–107)
CLARITY UR: CLEAR
CO2 SERPL-SCNC: 23 MMOL/L (ref 22–29)
COLOR UR: YELLOW
CREAT SERPL-MCNC: 0.68 MG/DL (ref 0.57–1)
DEPRECATED RDW RBC AUTO: 39.1 FL (ref 37–54)
EGFRCR SERPLBLD CKD-EPI 2021: 121.8 ML/MIN/1.73
EOSINOPHIL # BLD AUTO: 0.21 10*3/MM3 (ref 0–0.4)
EOSINOPHIL NFR BLD AUTO: 2.1 % (ref 0.3–6.2)
ERYTHROCYTE [DISTWIDTH] IN BLOOD BY AUTOMATED COUNT: 13.1 % (ref 12.3–15.4)
GLOBULIN UR ELPH-MCNC: 2.6 GM/DL
GLUCOSE SERPL-MCNC: 114 MG/DL (ref 65–99)
GLUCOSE UR STRIP-MCNC: NEGATIVE MG/DL
HCG SERPL QL: NEGATIVE
HCT VFR BLD AUTO: 40.4 % (ref 34–46.6)
HGB BLD-MCNC: 13.6 G/DL (ref 12–15.9)
HGB UR QL STRIP.AUTO: NEGATIVE
HOLD SPECIMEN: NORMAL
HYALINE CASTS UR QL AUTO: ABNORMAL /LPF
IMM GRANULOCYTES # BLD AUTO: 0.03 10*3/MM3 (ref 0–0.05)
IMM GRANULOCYTES NFR BLD AUTO: 0.3 % (ref 0–0.5)
KETONES UR QL STRIP: NEGATIVE
LEUKOCYTE ESTERASE UR QL STRIP.AUTO: ABNORMAL
LIPASE SERPL-CCNC: 20 U/L (ref 13–60)
LYMPHOCYTES # BLD AUTO: 1.07 10*3/MM3 (ref 0.7–3.1)
LYMPHOCYTES NFR BLD AUTO: 10.8 % (ref 19.6–45.3)
MCH RBC QN AUTO: 27.8 PG (ref 26.6–33)
MCHC RBC AUTO-ENTMCNC: 33.7 G/DL (ref 31.5–35.7)
MCV RBC AUTO: 82.4 FL (ref 79–97)
MONOCYTES # BLD AUTO: 0.86 10*3/MM3 (ref 0.1–0.9)
MONOCYTES NFR BLD AUTO: 8.7 % (ref 5–12)
NEUTROPHILS NFR BLD AUTO: 7.76 10*3/MM3 (ref 1.7–7)
NEUTROPHILS NFR BLD AUTO: 78 % (ref 42.7–76)
NITRITE UR QL STRIP: NEGATIVE
NRBC BLD AUTO-RTO: 0 /100 WBC (ref 0–0.2)
PH UR STRIP.AUTO: 7.5 [PH] (ref 5–8)
PLATELET # BLD AUTO: 239 10*3/MM3 (ref 140–450)
PMV BLD AUTO: 10.8 FL (ref 6–12)
POTASSIUM SERPL-SCNC: 3.9 MMOL/L (ref 3.5–5.2)
PROT SERPL-MCNC: 7.1 G/DL (ref 6–8.5)
PROT UR QL STRIP: NEGATIVE
RBC # BLD AUTO: 4.9 10*6/MM3 (ref 3.77–5.28)
RBC # UR STRIP: ABNORMAL /HPF
REF LAB TEST METHOD: ABNORMAL
SODIUM SERPL-SCNC: 139 MMOL/L (ref 136–145)
SP GR UR STRIP: 1.02 (ref 1–1.03)
SQUAMOUS #/AREA URNS HPF: ABNORMAL /HPF
UROBILINOGEN UR QL STRIP: ABNORMAL
WBC # UR STRIP: ABNORMAL /HPF
WBC NRBC COR # BLD: 9.94 10*3/MM3 (ref 3.4–10.8)
WHOLE BLOOD HOLD COAG: NORMAL
WHOLE BLOOD HOLD SPECIMEN: NORMAL

## 2023-09-09 PROCEDURE — 25010000002 MORPHINE PER 10 MG: Performed by: EMERGENCY MEDICINE

## 2023-09-09 PROCEDURE — 25010000002 HYDROMORPHONE PER 4 MG: Performed by: EMERGENCY MEDICINE

## 2023-09-09 PROCEDURE — 85025 COMPLETE CBC W/AUTO DIFF WBC: CPT

## 2023-09-09 PROCEDURE — 25510000001 IOPAMIDOL 61 % SOLUTION: Performed by: EMERGENCY MEDICINE

## 2023-09-09 PROCEDURE — 96375 TX/PRO/DX INJ NEW DRUG ADDON: CPT

## 2023-09-09 PROCEDURE — 25010000002 ONDANSETRON PER 1 MG: Performed by: EMERGENCY MEDICINE

## 2023-09-09 PROCEDURE — 99285 EMERGENCY DEPT VISIT HI MDM: CPT

## 2023-09-09 PROCEDURE — 80053 COMPREHEN METABOLIC PANEL: CPT | Performed by: EMERGENCY MEDICINE

## 2023-09-09 PROCEDURE — 81001 URINALYSIS AUTO W/SCOPE: CPT

## 2023-09-09 PROCEDURE — 84703 CHORIONIC GONADOTROPIN ASSAY: CPT | Performed by: EMERGENCY MEDICINE

## 2023-09-09 PROCEDURE — 83690 ASSAY OF LIPASE: CPT | Performed by: EMERGENCY MEDICINE

## 2023-09-09 PROCEDURE — 74177 CT ABD & PELVIS W/CONTRAST: CPT

## 2023-09-09 RX ORDER — SODIUM CHLORIDE 0.9 % (FLUSH) 0.9 %
10 SYRINGE (ML) INJECTION AS NEEDED
Status: DISCONTINUED | OUTPATIENT
Start: 2023-09-09 | End: 2023-09-12 | Stop reason: HOSPADM

## 2023-09-09 RX ORDER — HYDROMORPHONE HYDROCHLORIDE 1 MG/ML
0.5 INJECTION, SOLUTION INTRAMUSCULAR; INTRAVENOUS; SUBCUTANEOUS ONCE
Status: COMPLETED | OUTPATIENT
Start: 2023-09-09 | End: 2023-09-09

## 2023-09-09 RX ORDER — MORPHINE SULFATE 2 MG/ML
4 INJECTION, SOLUTION INTRAMUSCULAR; INTRAVENOUS ONCE
Status: COMPLETED | OUTPATIENT
Start: 2023-09-09 | End: 2023-09-09

## 2023-09-09 RX ORDER — ONDANSETRON 2 MG/ML
4 INJECTION INTRAMUSCULAR; INTRAVENOUS ONCE
Status: COMPLETED | OUTPATIENT
Start: 2023-09-09 | End: 2023-09-09

## 2023-09-09 RX ADMIN — IOPAMIDOL 85 ML: 612 INJECTION, SOLUTION INTRAVENOUS at 23:24

## 2023-09-09 RX ADMIN — HYDROMORPHONE HYDROCHLORIDE 0.5 MG: 1 INJECTION, SOLUTION INTRAMUSCULAR; INTRAVENOUS; SUBCUTANEOUS at 23:40

## 2023-09-09 RX ADMIN — ONDANSETRON 4 MG: 2 INJECTION INTRAMUSCULAR; INTRAVENOUS at 21:52

## 2023-09-09 RX ADMIN — SODIUM CHLORIDE, POTASSIUM CHLORIDE, SODIUM LACTATE AND CALCIUM CHLORIDE 1000 ML: 600; 310; 30; 20 INJECTION, SOLUTION INTRAVENOUS at 21:52

## 2023-09-09 RX ADMIN — MORPHINE SULFATE 4 MG: 2 INJECTION, SOLUTION INTRAMUSCULAR; INTRAVENOUS at 21:52

## 2023-09-10 ENCOUNTER — ANESTHESIA (OUTPATIENT)
Dept: PERIOP | Facility: HOSPITAL | Age: 28
End: 2023-09-10
Payer: COMMERCIAL

## 2023-09-10 ENCOUNTER — ANESTHESIA EVENT (OUTPATIENT)
Dept: PERIOP | Facility: HOSPITAL | Age: 28
End: 2023-09-10
Payer: COMMERCIAL

## 2023-09-10 ENCOUNTER — APPOINTMENT (OUTPATIENT)
Dept: ULTRASOUND IMAGING | Facility: HOSPITAL | Age: 28
End: 2023-09-10
Payer: COMMERCIAL

## 2023-09-10 PROBLEM — K35.80 ACUTE APPENDICITIS: Status: ACTIVE | Noted: 2023-09-10

## 2023-09-10 LAB
ANION GAP SERPL CALCULATED.3IONS-SCNC: 9.7 MMOL/L (ref 5–15)
BASOPHILS # BLD AUTO: 0.01 10*3/MM3 (ref 0–0.2)
BASOPHILS NFR BLD AUTO: 0.1 % (ref 0–1.5)
BUN SERPL-MCNC: 6 MG/DL (ref 6–20)
BUN/CREAT SERPL: 8.7 (ref 7–25)
CALCIUM SPEC-SCNC: 9 MG/DL (ref 8.6–10.5)
CHLORIDE SERPL-SCNC: 102 MMOL/L (ref 98–107)
CO2 SERPL-SCNC: 25.3 MMOL/L (ref 22–29)
CREAT SERPL-MCNC: 0.69 MG/DL (ref 0.57–1)
DEPRECATED RDW RBC AUTO: 38.6 FL (ref 37–54)
EGFRCR SERPLBLD CKD-EPI 2021: 121.4 ML/MIN/1.73
EOSINOPHIL # BLD AUTO: 0.1 10*3/MM3 (ref 0–0.4)
EOSINOPHIL NFR BLD AUTO: 0.9 % (ref 0.3–6.2)
ERYTHROCYTE [DISTWIDTH] IN BLOOD BY AUTOMATED COUNT: 12.9 % (ref 12.3–15.4)
GLUCOSE SERPL-MCNC: 105 MG/DL (ref 65–99)
HCT VFR BLD AUTO: 38.5 % (ref 34–46.6)
HGB BLD-MCNC: 13 G/DL (ref 12–15.9)
IMM GRANULOCYTES # BLD AUTO: 0.06 10*3/MM3 (ref 0–0.05)
IMM GRANULOCYTES NFR BLD AUTO: 0.6 % (ref 0–0.5)
LYMPHOCYTES # BLD AUTO: 1.61 10*3/MM3 (ref 0.7–3.1)
LYMPHOCYTES NFR BLD AUTO: 14.9 % (ref 19.6–45.3)
MCH RBC QN AUTO: 27.8 PG (ref 26.6–33)
MCHC RBC AUTO-ENTMCNC: 33.8 G/DL (ref 31.5–35.7)
MCV RBC AUTO: 82.3 FL (ref 79–97)
MONOCYTES # BLD AUTO: 1.13 10*3/MM3 (ref 0.1–0.9)
MONOCYTES NFR BLD AUTO: 10.4 % (ref 5–12)
NEUTROPHILS NFR BLD AUTO: 7.93 10*3/MM3 (ref 1.7–7)
NEUTROPHILS NFR BLD AUTO: 73.1 % (ref 42.7–76)
NRBC BLD AUTO-RTO: 0 /100 WBC (ref 0–0.2)
PLATELET # BLD AUTO: 208 10*3/MM3 (ref 140–450)
PMV BLD AUTO: 10.6 FL (ref 6–12)
POTASSIUM SERPL-SCNC: 3.6 MMOL/L (ref 3.5–5.2)
RBC # BLD AUTO: 4.68 10*6/MM3 (ref 3.77–5.28)
SODIUM SERPL-SCNC: 137 MMOL/L (ref 136–145)
WBC NRBC COR # BLD: 10.84 10*3/MM3 (ref 3.4–10.8)

## 2023-09-10 PROCEDURE — 99223 1ST HOSP IP/OBS HIGH 75: CPT | Performed by: STUDENT IN AN ORGANIZED HEALTH CARE EDUCATION/TRAINING PROGRAM

## 2023-09-10 PROCEDURE — 25010000002 FENTANYL CITRATE (PF) 50 MCG/ML SOLUTION: Performed by: STUDENT IN AN ORGANIZED HEALTH CARE EDUCATION/TRAINING PROGRAM

## 2023-09-10 PROCEDURE — 25010000002 ONDANSETRON PER 1 MG: Performed by: STUDENT IN AN ORGANIZED HEALTH CARE EDUCATION/TRAINING PROGRAM

## 2023-09-10 PROCEDURE — 44970 LAPAROSCOPY APPENDECTOMY: CPT | Performed by: REGISTERED NURSE

## 2023-09-10 PROCEDURE — 25010000002 HYDROMORPHONE PER 4 MG: Performed by: STUDENT IN AN ORGANIZED HEALTH CARE EDUCATION/TRAINING PROGRAM

## 2023-09-10 PROCEDURE — 25010000002 PROPOFOL 10 MG/ML EMULSION: Performed by: STUDENT IN AN ORGANIZED HEALTH CARE EDUCATION/TRAINING PROGRAM

## 2023-09-10 PROCEDURE — 88304 TISSUE EXAM BY PATHOLOGIST: CPT | Performed by: STUDENT IN AN ORGANIZED HEALTH CARE EDUCATION/TRAINING PROGRAM

## 2023-09-10 PROCEDURE — 25010000002 KETOROLAC TROMETHAMINE PER 15 MG: Performed by: STUDENT IN AN ORGANIZED HEALTH CARE EDUCATION/TRAINING PROGRAM

## 2023-09-10 PROCEDURE — 25010000002 HYDROMORPHONE 1 MG/ML SOLUTION: Performed by: STUDENT IN AN ORGANIZED HEALTH CARE EDUCATION/TRAINING PROGRAM

## 2023-09-10 PROCEDURE — 44970 LAPAROSCOPY APPENDECTOMY: CPT | Performed by: STUDENT IN AN ORGANIZED HEALTH CARE EDUCATION/TRAINING PROGRAM

## 2023-09-10 PROCEDURE — 85025 COMPLETE CBC W/AUTO DIFF WBC: CPT | Performed by: STUDENT IN AN ORGANIZED HEALTH CARE EDUCATION/TRAINING PROGRAM

## 2023-09-10 PROCEDURE — 25010000002 PIPERACILLIN SOD-TAZOBACTAM PER 1 G: Performed by: EMERGENCY MEDICINE

## 2023-09-10 PROCEDURE — 25010000002 SUGAMMADEX 200 MG/2ML SOLUTION: Performed by: STUDENT IN AN ORGANIZED HEALTH CARE EDUCATION/TRAINING PROGRAM

## 2023-09-10 PROCEDURE — 25010000002 DEXAMETHASONE SODIUM PHOSPHATE 20 MG/5ML SOLUTION: Performed by: STUDENT IN AN ORGANIZED HEALTH CARE EDUCATION/TRAINING PROGRAM

## 2023-09-10 PROCEDURE — 96376 TX/PRO/DX INJ SAME DRUG ADON: CPT

## 2023-09-10 PROCEDURE — 94799 UNLISTED PULMONARY SVC/PX: CPT

## 2023-09-10 PROCEDURE — 96365 THER/PROPH/DIAG IV INF INIT: CPT

## 2023-09-10 PROCEDURE — 25010000002 MIDAZOLAM PER 1 MG: Performed by: STUDENT IN AN ORGANIZED HEALTH CARE EDUCATION/TRAINING PROGRAM

## 2023-09-10 PROCEDURE — 80048 BASIC METABOLIC PNL TOTAL CA: CPT | Performed by: STUDENT IN AN ORGANIZED HEALTH CARE EDUCATION/TRAINING PROGRAM

## 2023-09-10 PROCEDURE — 25010000002 PIPERACILLIN SOD-TAZOBACTAM PER 1 G: Performed by: STUDENT IN AN ORGANIZED HEALTH CARE EDUCATION/TRAINING PROGRAM

## 2023-09-10 PROCEDURE — 94640 AIRWAY INHALATION TREATMENT: CPT

## 2023-09-10 DEVICE — THE ECHELON, ECHELON ENDOPATH™ AND ECHELON FLEX™ FAMILIES OF ENDOSCOPIC LINEAR CUTTERS AND RELOADS ARE STERILE, SINGLE PATIENT USE INSTRUMENTS THAT SIMULTANEOUSLY CUT AND STAPLE TISSUE. THERE ARE SIX STAGGERED ROWS OF STAPLES, THREE ON EITHER SIDE OF THE CUT LINE. THE 45 MM INSTRUMENTS HAVE A STAPLE LINE THATIS APPROXIMATELY 45 MM LONG AND A CUT LINE THAT IS APPROXIMATELY 42 MM LONG. THE SHAFT CAN ROTATE FREELY IN BOTH DIRECTIONS AND AN ARTICULATION MECHANISM ON ARTICULATING INSTRUMENTS ENABLES BENDING THE DISTAL PORTIONOF THE SHAFT TO FACILITATE LATERAL ACCESS OF THE OPERATIVE SITE.THE INSTRUMENTS ARE SHIPPED WITHOUT A RELOAD AND MUST BE LOADED PRIOR TO USE. A STAPLE RETAINING CAP ON THE RELOAD PROTECTS THE STAPLE LEG POINTS DURING SHIPPING AND TRANSPORTATION. THE INSTRUMENTS’ LOCK-OUT FEATURE IS DESIGNED TO PREVENT A USED RELOAD FROM BEING REFIRED.
Type: IMPLANTABLE DEVICE | Site: ABDOMEN | Status: FUNCTIONAL
Brand: ECHELON ENDOPATH

## 2023-09-10 RX ORDER — ALBUTEROL SULFATE 2.5 MG/3ML
2.5 SOLUTION RESPIRATORY (INHALATION)
Status: DISCONTINUED | OUTPATIENT
Start: 2023-09-10 | End: 2023-09-10

## 2023-09-10 RX ORDER — LIDOCAINE HYDROCHLORIDE 20 MG/ML
INJECTION, SOLUTION INFILTRATION; PERINEURAL AS NEEDED
Status: DISCONTINUED | OUTPATIENT
Start: 2023-09-10 | End: 2023-09-10 | Stop reason: SURG

## 2023-09-10 RX ORDER — HYDRALAZINE HYDROCHLORIDE 20 MG/ML
5 INJECTION INTRAMUSCULAR; INTRAVENOUS
Status: DISCONTINUED | OUTPATIENT
Start: 2023-09-10 | End: 2023-09-10 | Stop reason: HOSPADM

## 2023-09-10 RX ORDER — KETOROLAC TROMETHAMINE 30 MG/ML
INJECTION, SOLUTION INTRAMUSCULAR; INTRAVENOUS AS NEEDED
Status: DISCONTINUED | OUTPATIENT
Start: 2023-09-10 | End: 2023-09-10 | Stop reason: SURG

## 2023-09-10 RX ORDER — ONDANSETRON 2 MG/ML
4 INJECTION INTRAMUSCULAR; INTRAVENOUS ONCE AS NEEDED
Status: DISCONTINUED | OUTPATIENT
Start: 2023-09-10 | End: 2023-09-10 | Stop reason: HOSPADM

## 2023-09-10 RX ORDER — SUCCINYLCHOLINE/SOD CL,ISO/PF 200MG/10ML
SYRINGE (ML) INTRAVENOUS AS NEEDED
Status: DISCONTINUED | OUTPATIENT
Start: 2023-09-10 | End: 2023-09-10 | Stop reason: SURG

## 2023-09-10 RX ORDER — LIDOCAINE HYDROCHLORIDE 10 MG/ML
0.5 INJECTION, SOLUTION INFILTRATION; PERINEURAL ONCE AS NEEDED
Status: DISCONTINUED | OUTPATIENT
Start: 2023-09-10 | End: 2023-09-10 | Stop reason: HOSPADM

## 2023-09-10 RX ORDER — BISACODYL 10 MG
10 SUPPOSITORY, RECTAL RECTAL DAILY PRN
Status: DISCONTINUED | OUTPATIENT
Start: 2023-09-10 | End: 2023-09-12 | Stop reason: HOSPADM

## 2023-09-10 RX ORDER — PROMETHAZINE HYDROCHLORIDE 25 MG/1
25 SUPPOSITORY RECTAL ONCE AS NEEDED
Status: DISCONTINUED | OUTPATIENT
Start: 2023-09-10 | End: 2023-09-10 | Stop reason: HOSPADM

## 2023-09-10 RX ORDER — DROPERIDOL 2.5 MG/ML
0.62 INJECTION, SOLUTION INTRAMUSCULAR; INTRAVENOUS
Status: DISCONTINUED | OUTPATIENT
Start: 2023-09-10 | End: 2023-09-10 | Stop reason: HOSPADM

## 2023-09-10 RX ORDER — MAGNESIUM HYDROXIDE 1200 MG/15ML
LIQUID ORAL AS NEEDED
Status: DISCONTINUED | OUTPATIENT
Start: 2023-09-10 | End: 2023-09-10 | Stop reason: HOSPADM

## 2023-09-10 RX ORDER — DEXTROSE AND SODIUM CHLORIDE 5; .45 G/100ML; G/100ML
100 INJECTION, SOLUTION INTRAVENOUS CONTINUOUS
Status: DISCONTINUED | OUTPATIENT
Start: 2023-09-10 | End: 2023-09-11

## 2023-09-10 RX ORDER — SODIUM CHLORIDE 0.9 % (FLUSH) 0.9 %
3-10 SYRINGE (ML) INJECTION AS NEEDED
Status: DISCONTINUED | OUTPATIENT
Start: 2023-09-10 | End: 2023-09-10 | Stop reason: HOSPADM

## 2023-09-10 RX ORDER — FLUMAZENIL 0.1 MG/ML
0.2 INJECTION INTRAVENOUS AS NEEDED
Status: DISCONTINUED | OUTPATIENT
Start: 2023-09-10 | End: 2023-09-10 | Stop reason: HOSPADM

## 2023-09-10 RX ORDER — LABETALOL HYDROCHLORIDE 5 MG/ML
5 INJECTION, SOLUTION INTRAVENOUS
Status: DISCONTINUED | OUTPATIENT
Start: 2023-09-10 | End: 2023-09-10 | Stop reason: HOSPADM

## 2023-09-10 RX ORDER — KETOROLAC TROMETHAMINE 30 MG/ML
30 INJECTION, SOLUTION INTRAMUSCULAR; INTRAVENOUS EVERY 6 HOURS PRN
Status: DISCONTINUED | OUTPATIENT
Start: 2023-09-10 | End: 2023-09-10

## 2023-09-10 RX ORDER — MIDAZOLAM HYDROCHLORIDE 1 MG/ML
1 INJECTION INTRAMUSCULAR; INTRAVENOUS
Status: DISCONTINUED | OUTPATIENT
Start: 2023-09-10 | End: 2023-09-10 | Stop reason: HOSPADM

## 2023-09-10 RX ORDER — HYDROMORPHONE HYDROCHLORIDE 1 MG/ML
0.5 INJECTION, SOLUTION INTRAMUSCULAR; INTRAVENOUS; SUBCUTANEOUS
Status: DISCONTINUED | OUTPATIENT
Start: 2023-09-10 | End: 2023-09-10 | Stop reason: HOSPADM

## 2023-09-10 RX ORDER — DEXAMETHASONE SODIUM PHOSPHATE 4 MG/ML
INJECTION, SOLUTION INTRA-ARTICULAR; INTRALESIONAL; INTRAMUSCULAR; INTRAVENOUS; SOFT TISSUE AS NEEDED
Status: DISCONTINUED | OUTPATIENT
Start: 2023-09-10 | End: 2023-09-10 | Stop reason: SURG

## 2023-09-10 RX ORDER — SODIUM CHLORIDE 0.9 % (FLUSH) 0.9 %
10 SYRINGE (ML) INJECTION EVERY 12 HOURS SCHEDULED
Status: DISCONTINUED | OUTPATIENT
Start: 2023-09-10 | End: 2023-09-12 | Stop reason: HOSPADM

## 2023-09-10 RX ORDER — AMOXICILLIN 250 MG
2 CAPSULE ORAL 2 TIMES DAILY
Status: DISCONTINUED | OUTPATIENT
Start: 2023-09-10 | End: 2023-09-12 | Stop reason: HOSPADM

## 2023-09-10 RX ORDER — BISACODYL 5 MG/1
5 TABLET, DELAYED RELEASE ORAL DAILY PRN
Status: DISCONTINUED | OUTPATIENT
Start: 2023-09-10 | End: 2023-09-12 | Stop reason: HOSPADM

## 2023-09-10 RX ORDER — MONTELUKAST SODIUM 10 MG/1
5 TABLET ORAL DAILY
Status: DISCONTINUED | OUTPATIENT
Start: 2023-09-10 | End: 2023-09-12 | Stop reason: HOSPADM

## 2023-09-10 RX ORDER — FENTANYL CITRATE 50 UG/ML
INJECTION, SOLUTION INTRAMUSCULAR; INTRAVENOUS AS NEEDED
Status: DISCONTINUED | OUTPATIENT
Start: 2023-09-10 | End: 2023-09-10 | Stop reason: SURG

## 2023-09-10 RX ORDER — HYDROMORPHONE HYDROCHLORIDE 1 MG/ML
0.5 INJECTION, SOLUTION INTRAMUSCULAR; INTRAVENOUS; SUBCUTANEOUS
Status: DISCONTINUED | OUTPATIENT
Start: 2023-09-10 | End: 2023-09-12 | Stop reason: HOSPADM

## 2023-09-10 RX ORDER — CEFAZOLIN SODIUM IN 0.9 % NACL 2 G/100 ML
2000 PLASTIC BAG, INJECTION (ML) INTRAVENOUS ONCE
Status: DISCONTINUED | OUTPATIENT
Start: 2023-09-10 | End: 2023-09-10

## 2023-09-10 RX ORDER — ALBUTEROL SULFATE 2.5 MG/3ML
2.5 SOLUTION RESPIRATORY (INHALATION) EVERY 4 HOURS PRN
Status: DISCONTINUED | OUTPATIENT
Start: 2023-09-10 | End: 2023-09-12 | Stop reason: HOSPADM

## 2023-09-10 RX ORDER — NALOXONE HCL 0.4 MG/ML
0.2 VIAL (ML) INJECTION AS NEEDED
Status: DISCONTINUED | OUTPATIENT
Start: 2023-09-10 | End: 2023-09-10 | Stop reason: HOSPADM

## 2023-09-10 RX ORDER — PROMETHAZINE HYDROCHLORIDE 25 MG/1
25 TABLET ORAL ONCE AS NEEDED
Status: DISCONTINUED | OUTPATIENT
Start: 2023-09-10 | End: 2023-09-10 | Stop reason: HOSPADM

## 2023-09-10 RX ORDER — BUDESONIDE AND FORMOTEROL FUMARATE DIHYDRATE 160; 4.5 UG/1; UG/1
1 AEROSOL RESPIRATORY (INHALATION)
Status: DISCONTINUED | OUTPATIENT
Start: 2023-09-10 | End: 2023-09-12 | Stop reason: HOSPADM

## 2023-09-10 RX ORDER — SODIUM CHLORIDE 0.9 % (FLUSH) 0.9 %
10 SYRINGE (ML) INJECTION AS NEEDED
Status: DISCONTINUED | OUTPATIENT
Start: 2023-09-10 | End: 2023-09-12 | Stop reason: HOSPADM

## 2023-09-10 RX ORDER — SODIUM CHLORIDE 0.9 % (FLUSH) 0.9 %
3 SYRINGE (ML) INJECTION EVERY 12 HOURS SCHEDULED
Status: DISCONTINUED | OUTPATIENT
Start: 2023-09-10 | End: 2023-09-10 | Stop reason: HOSPADM

## 2023-09-10 RX ORDER — FENTANYL CITRATE 50 UG/ML
50 INJECTION, SOLUTION INTRAMUSCULAR; INTRAVENOUS
Status: DISCONTINUED | OUTPATIENT
Start: 2023-09-10 | End: 2023-09-10 | Stop reason: HOSPADM

## 2023-09-10 RX ORDER — TRAMADOL HYDROCHLORIDE 50 MG/1
50 TABLET ORAL EVERY 6 HOURS PRN
Status: DISCONTINUED | OUTPATIENT
Start: 2023-09-10 | End: 2023-09-12 | Stop reason: HOSPADM

## 2023-09-10 RX ORDER — IPRATROPIUM BROMIDE AND ALBUTEROL SULFATE 2.5; .5 MG/3ML; MG/3ML
3 SOLUTION RESPIRATORY (INHALATION) ONCE AS NEEDED
Status: DISCONTINUED | OUTPATIENT
Start: 2023-09-10 | End: 2023-09-10 | Stop reason: HOSPADM

## 2023-09-10 RX ORDER — DIPHENHYDRAMINE HYDROCHLORIDE 50 MG/ML
12.5 INJECTION INTRAMUSCULAR; INTRAVENOUS
Status: DISCONTINUED | OUTPATIENT
Start: 2023-09-10 | End: 2023-09-10 | Stop reason: HOSPADM

## 2023-09-10 RX ORDER — CEFAZOLIN SODIUM 2 G/100ML
2000 INJECTION, SOLUTION INTRAVENOUS ONCE
Status: DISCONTINUED | OUTPATIENT
Start: 2023-09-10 | End: 2023-09-10 | Stop reason: ALTCHOICE

## 2023-09-10 RX ORDER — FENTANYL CITRATE 50 UG/ML
50 INJECTION, SOLUTION INTRAMUSCULAR; INTRAVENOUS ONCE AS NEEDED
Status: COMPLETED | OUTPATIENT
Start: 2023-09-10 | End: 2023-09-10

## 2023-09-10 RX ORDER — SODIUM CHLORIDE 9 MG/ML
40 INJECTION, SOLUTION INTRAVENOUS AS NEEDED
Status: DISCONTINUED | OUTPATIENT
Start: 2023-09-10 | End: 2023-09-12 | Stop reason: HOSPADM

## 2023-09-10 RX ORDER — PROPOFOL 10 MG/ML
VIAL (ML) INTRAVENOUS AS NEEDED
Status: DISCONTINUED | OUTPATIENT
Start: 2023-09-10 | End: 2023-09-10 | Stop reason: SURG

## 2023-09-10 RX ORDER — BUPIVACAINE HYDROCHLORIDE AND EPINEPHRINE 5; 5 MG/ML; UG/ML
INJECTION, SOLUTION EPIDURAL; INTRACAUDAL; PERINEURAL AS NEEDED
Status: DISCONTINUED | OUTPATIENT
Start: 2023-09-10 | End: 2023-09-10 | Stop reason: HOSPADM

## 2023-09-10 RX ORDER — FAMOTIDINE 10 MG/ML
20 INJECTION, SOLUTION INTRAVENOUS ONCE
Status: COMPLETED | OUTPATIENT
Start: 2023-09-10 | End: 2023-09-10

## 2023-09-10 RX ORDER — EPHEDRINE SULFATE 50 MG/ML
5 INJECTION, SOLUTION INTRAVENOUS ONCE AS NEEDED
Status: DISCONTINUED | OUTPATIENT
Start: 2023-09-10 | End: 2023-09-10 | Stop reason: HOSPADM

## 2023-09-10 RX ORDER — ESCITALOPRAM OXALATE 10 MG/1
10 TABLET ORAL DAILY
Status: DISCONTINUED | OUTPATIENT
Start: 2023-09-10 | End: 2023-09-12 | Stop reason: HOSPADM

## 2023-09-10 RX ORDER — HYDROCODONE BITARTRATE AND ACETAMINOPHEN 7.5; 325 MG/1; MG/1
1 TABLET ORAL ONCE AS NEEDED
Status: DISCONTINUED | OUTPATIENT
Start: 2023-09-10 | End: 2023-09-10 | Stop reason: HOSPADM

## 2023-09-10 RX ORDER — PHENYLEPHRINE HCL IN 0.9% NACL 0.5 MG/5ML
SYRINGE (ML) INTRAVENOUS AS NEEDED
Status: DISCONTINUED | OUTPATIENT
Start: 2023-09-10 | End: 2023-09-10 | Stop reason: SURG

## 2023-09-10 RX ORDER — OXYCODONE AND ACETAMINOPHEN 7.5; 325 MG/1; MG/1
1 TABLET ORAL EVERY 4 HOURS PRN
Status: DISCONTINUED | OUTPATIENT
Start: 2023-09-10 | End: 2023-09-10 | Stop reason: HOSPADM

## 2023-09-10 RX ORDER — ROCURONIUM BROMIDE 10 MG/ML
INJECTION, SOLUTION INTRAVENOUS AS NEEDED
Status: DISCONTINUED | OUTPATIENT
Start: 2023-09-10 | End: 2023-09-10 | Stop reason: SURG

## 2023-09-10 RX ORDER — POLYETHYLENE GLYCOL 3350 17 G/17G
17 POWDER, FOR SOLUTION ORAL DAILY PRN
Status: DISCONTINUED | OUTPATIENT
Start: 2023-09-10 | End: 2023-09-12 | Stop reason: HOSPADM

## 2023-09-10 RX ORDER — SODIUM CHLORIDE, SODIUM LACTATE, POTASSIUM CHLORIDE, CALCIUM CHLORIDE 600; 310; 30; 20 MG/100ML; MG/100ML; MG/100ML; MG/100ML
9 INJECTION, SOLUTION INTRAVENOUS CONTINUOUS
Status: DISCONTINUED | OUTPATIENT
Start: 2023-09-10 | End: 2023-09-12 | Stop reason: HOSPADM

## 2023-09-10 RX ORDER — ONDANSETRON 2 MG/ML
4 INJECTION INTRAMUSCULAR; INTRAVENOUS EVERY 6 HOURS PRN
Status: DISCONTINUED | OUTPATIENT
Start: 2023-09-10 | End: 2023-09-12 | Stop reason: HOSPADM

## 2023-09-10 RX ADMIN — DEXTROSE AND SODIUM CHLORIDE 100 ML/HR: 5; 450 INJECTION, SOLUTION INTRAVENOUS at 15:21

## 2023-09-10 RX ADMIN — FAMOTIDINE 20 MG: 10 INJECTION, SOLUTION INTRAVENOUS at 12:35

## 2023-09-10 RX ADMIN — HYDROMORPHONE HYDROCHLORIDE 0.5 MG: 1 INJECTION, SOLUTION INTRAMUSCULAR; INTRAVENOUS; SUBCUTANEOUS at 05:29

## 2023-09-10 RX ADMIN — FENTANYL CITRATE 50 MCG: 50 INJECTION, SOLUTION INTRAMUSCULAR; INTRAVENOUS at 13:20

## 2023-09-10 RX ADMIN — HYDROMORPHONE HYDROCHLORIDE 0.5 MG: 1 INJECTION, SOLUTION INTRAMUSCULAR; INTRAVENOUS; SUBCUTANEOUS at 17:14

## 2023-09-10 RX ADMIN — MIDAZOLAM 1 MG: 1 INJECTION INTRAMUSCULAR; INTRAVENOUS at 12:35

## 2023-09-10 RX ADMIN — Medication 10 ML: at 08:04

## 2023-09-10 RX ADMIN — SUGAMMADEX 200 MG: 100 INJECTION, SOLUTION INTRAVENOUS at 13:55

## 2023-09-10 RX ADMIN — HYDROMORPHONE HYDROCHLORIDE 0.5 MG: 1 INJECTION, SOLUTION INTRAMUSCULAR; INTRAVENOUS; SUBCUTANEOUS at 11:23

## 2023-09-10 RX ADMIN — PIPERACILLIN SODIUM AND TAZOBACTAM SODIUM 3.38 G: 3; .375 INJECTION, SOLUTION INTRAVENOUS at 00:42

## 2023-09-10 RX ADMIN — ONDANSETRON 4 MG: 2 INJECTION INTRAMUSCULAR; INTRAVENOUS at 03:15

## 2023-09-10 RX ADMIN — HYDROMORPHONE HYDROCHLORIDE 0.5 MG: 1 INJECTION, SOLUTION INTRAMUSCULAR; INTRAVENOUS; SUBCUTANEOUS at 03:15

## 2023-09-10 RX ADMIN — ONDANSETRON 4 MG: 2 INJECTION INTRAMUSCULAR; INTRAVENOUS at 12:35

## 2023-09-10 RX ADMIN — Medication 120 MG: at 13:04

## 2023-09-10 RX ADMIN — BUDESONIDE AND FORMOTEROL FUMARATE DIHYDRATE 1 PUFF: 160; 4.5 AEROSOL RESPIRATORY (INHALATION) at 20:57

## 2023-09-10 RX ADMIN — PROPOFOL 20 MG: 10 INJECTION, EMULSION INTRAVENOUS at 13:55

## 2023-09-10 RX ADMIN — MONTELUKAST SODIUM 5 MG: 10 TABLET, FILM COATED ORAL at 22:50

## 2023-09-10 RX ADMIN — ONDANSETRON 4 MG: 2 INJECTION INTRAMUSCULAR; INTRAVENOUS at 11:19

## 2023-09-10 RX ADMIN — Medication 10 ML: at 20:04

## 2023-09-10 RX ADMIN — DEXTROSE AND SODIUM CHLORIDE 100 ML/HR: 5; 450 INJECTION, SOLUTION INTRAVENOUS at 03:15

## 2023-09-10 RX ADMIN — PROPOFOL 20 MG: 10 INJECTION, EMULSION INTRAVENOUS at 13:53

## 2023-09-10 RX ADMIN — MIDAZOLAM 1 MG: 1 INJECTION INTRAMUSCULAR; INTRAVENOUS at 12:40

## 2023-09-10 RX ADMIN — FENTANYL CITRATE 50 MCG: 50 INJECTION, SOLUTION INTRAMUSCULAR; INTRAVENOUS at 12:33

## 2023-09-10 RX ADMIN — ESCITALOPRAM OXALATE 10 MG: 10 TABLET, FILM COATED ORAL at 22:50

## 2023-09-10 RX ADMIN — HYDROMORPHONE HYDROCHLORIDE 0.5 MG: 1 INJECTION, SOLUTION INTRAMUSCULAR; INTRAVENOUS; SUBCUTANEOUS at 13:31

## 2023-09-10 RX ADMIN — DEXAMETHASONE SODIUM PHOSPHATE 8 MG: 4 INJECTION, SOLUTION INTRAMUSCULAR; INTRAVENOUS at 13:20

## 2023-09-10 RX ADMIN — SENNOSIDES AND DOCUSATE SODIUM 2 TABLET: 50; 8.6 TABLET ORAL at 20:04

## 2023-09-10 RX ADMIN — LIDOCAINE HYDROCHLORIDE 80 MG: 20 INJECTION, SOLUTION INFILTRATION; PERINEURAL at 13:04

## 2023-09-10 RX ADMIN — TRAMADOL HYDROCHLORIDE 50 MG: 50 TABLET, COATED ORAL at 22:49

## 2023-09-10 RX ADMIN — PIPERACILLIN SODIUM AND TAZOBACTAM SODIUM 3.38 G: 3; .375 INJECTION, SOLUTION INTRAVENOUS at 22:51

## 2023-09-10 RX ADMIN — HYDROMORPHONE HYDROCHLORIDE 0.5 MG: 1 INJECTION, SOLUTION INTRAMUSCULAR; INTRAVENOUS; SUBCUTANEOUS at 20:04

## 2023-09-10 RX ADMIN — PROPOFOL 200 MG: 10 INJECTION, EMULSION INTRAVENOUS at 13:04

## 2023-09-10 RX ADMIN — Medication 100 MCG: at 13:13

## 2023-09-10 RX ADMIN — SODIUM CHLORIDE, POTASSIUM CHLORIDE, SODIUM LACTATE AND CALCIUM CHLORIDE 9 ML/HR: 600; 310; 30; 20 INJECTION, SOLUTION INTRAVENOUS at 12:36

## 2023-09-10 RX ADMIN — CEFAZOLIN 2000 MG: 1 INJECTION, POWDER, FOR SOLUTION INTRAMUSCULAR; INTRAVENOUS; PARENTERAL at 12:53

## 2023-09-10 RX ADMIN — ROCURONIUM BROMIDE 30 MG: 10 INJECTION INTRAVENOUS at 13:09

## 2023-09-10 RX ADMIN — ONDANSETRON 4 MG: 2 INJECTION INTRAMUSCULAR; INTRAVENOUS at 20:04

## 2023-09-10 RX ADMIN — HYDROMORPHONE HYDROCHLORIDE 0.5 MG: 1 INJECTION, SOLUTION INTRAMUSCULAR; INTRAVENOUS; SUBCUTANEOUS at 08:03

## 2023-09-10 RX ADMIN — KETOROLAC TROMETHAMINE 30 MG: 30 INJECTION, SOLUTION INTRAMUSCULAR at 13:50

## 2023-09-10 NOTE — H&P
General Surgery history and physical exam    Summary:    Mrs. Jana Nix is a 28 y.o. year old lady with acute appendicitis.  Recommendation for laparoscopic appendectomy.  All risks (including bleeding, infection, damage to surrounding structures), benefits, and alternatives were explained to the patient who agreed wish to proceed.  On IV antibiotics.    Chief Complaint:    Right lower quadrant abdominal pain    History of Present Illness:    Mrs. Jana Nix is a 28 y.o. year old lady who presented to the ER with a 36-hour history of right lower quadrant abdominal pain and nausea.  No vomiting.  She has had prior similar events but not to this level of severity.  She saw Dr. Murray last year for a borderline enlarged appendix but they agreed for observation at that time.    Past Medical History:   Asthma    Past Surgical History:     x3    Family History:    No family history of colon cancer    Social History:    Denies tobacco use  Occasional alcohol use    Allergies:   No Known Allergies    Medications:     Current Facility-Administered Medications:     sennosides-docusate (PERICOLACE) 8.6-50 MG per tablet 2 tablet, 2 tablet, Oral, BID **AND** polyethylene glycol (MIRALAX) packet 17 g, 17 g, Oral, Daily PRN **AND** bisacodyl (DULCOLAX) EC tablet 5 mg, 5 mg, Oral, Daily PRN **AND** bisacodyl (DULCOLAX) suppository 10 mg, 10 mg, Rectal, Daily PRN, Josseline Ayon MD    dextrose 5 % and sodium chloride 0.45 % infusion, 100 mL/hr, Intravenous, Continuous, Josseline Ayon MD, Last Rate: 100 mL/hr at 09/10/23 0315, 100 mL/hr at 09/10/23 0315    HYDROmorphone (DILAUDID) injection 0.5 mg, 0.5 mg, Intravenous, Q2H PRN, Josseline Ayon MD, 0.5 mg at 09/10/23 0803    ondansetron (ZOFRAN) injection 4 mg, 4 mg, Intravenous, Q6H PRN, Josseline Ayon MD, 4 mg at 09/10/23 0315    sodium chloride 0.9 % flush 10 mL, 10 mL, Intravenous, PRN, Zeke Chaidez MD    sodium chloride 0.9 % flush 10  mL, 10 mL, Intravenous, Q12H, Josseline Sanchez MD, 10 mL at 09/10/23 0804    sodium chloride 0.9 % flush 10 mL, 10 mL, Intravenous, PRN, Josseline Sanchez MD    sodium chloride 0.9 % infusion 40 mL, 40 mL, Intravenous, PRN, Josseline Sanchez MD    Radiology/Endoscopy:    CT abdomen pelvis reviewed: Acute appendicitis    Labs:    White blood cell count 10 hemoglobin 13 platelets 208 creatinine 0.69    Review of Systems:   Influenza-like illness: no fever, no  cough, no  sore throat, no  body aches, no loss of sense of taste or smell, no known exposure to person with Covid-19.  Constitutional: Negative for fevers or chills  HENT: Negative for hearing loss or runny nose  Eyes: Negative for vision changes or scleral icterus  Respiratory: Negative for cough or shortness of breath  Cardiovascular: Negative for chest pain or heart palpitations  Gastrointestinal: + for abdominal pain, nausea, denies vomiting, constipation, melena, or hematochezia  Genitourinary: Negative for hematuria or dysuria  Musculoskeletal: Negative for joint swelling or gait instability  Neurologic: Negative for tremors or seizures  Psychiatric: Negative for suicidal ideations or depression  All other systems reviewed and negative    Physical Exam:   Constitutional: Well-developed, well-nourished, no acute distress  Eyes:  Conjunctivae normal, sclerae nonicteric  ENMT: Hearing grossly normal, oral mucosa moist  Neck: Supple, trachea midline  Respiratory: Clear to auscultation, normal inspiratory effort  Cardiovascular: Regular rate, no peripheral edema, no jugular venous distention  Gastrointestinal: Soft, tender in the right lower and upper quadrants, nondistended  Skin:  Warm, dry, no rash on visualized skin surfaces  Musculoskeletal: Symmetric strength, normal gait  Psychiatric: Alert and oriented ×3, normal affect     JOSSELNIE SANCHEZ M.D.  General and Endoscopic Surgery  [facility]    [address], [address]  [facility]  [place], [address]  P: [phone]  F: [phone]

## 2023-09-10 NOTE — ANESTHESIA PROCEDURE NOTES
Airway  Urgency: elective    Date/Time: 9/10/2023 1:05 PM  Airway not difficult    General Information and Staff    Patient location during procedure: OR  Anesthesiologist: Timmy Samuels MD    Indications and Patient Condition  Indications for airway management: airway protection    Preoxygenated: yes  MILS maintained throughout  Mask difficulty assessment: 0 - not attempted    Final Airway Details  Final airway type: endotracheal airway      Successful airway: ETT  Cuffed: yes   Successful intubation technique: video laryngoscopy and RSI  Facilitating devices/methods: cricoid pressure and intubating stylet  Endotracheal tube insertion site: oral  Blade: CMAC  Blade size: D  ETT size (mm): 7.0  Cormack-Lehane Classification: grade I - full view of glottis  Placement verified by: chest auscultation and capnometry   Cuff volume (mL): 8  Measured from: teeth  ETT/EBT  to teeth (cm): 21  Number of attempts at approach: 1  Assessment: lips, teeth, and gum same as pre-op and atraumatic intubation

## 2023-09-10 NOTE — ANESTHESIA PREPROCEDURE EVALUATION
Anesthesia Evaluation     Patient summary reviewed and Nursing notes reviewed   NPO Solid Status: > 8 hours  NPO Liquid Status: > 2 hours           Airway   Mallampati: III  TM distance: <3 FB  Neck ROM: full  Possible difficult intubation  Dental - normal exam     Pulmonary - normal exam   (+) asthma,  Cardiovascular - negative cardio ROS and normal exam      ROS comment: H/o PVCs    Neuro/Psych  (+) headaches, psychiatric history Depression and Anxiety  GI/Hepatic/Renal/Endo - negative ROS     ROS Comment: Acute appendicitis  +N -V    Musculoskeletal (-) negative ROS    Abdominal    Substance History   (+) alcohol use  (-) drug use     OB/GYN negative ob/gyn ROS         Other - negative ROS       ROS/Med Hx Other: + Nausea - B                  Anesthesia Plan    ASA 2     general   Rapid sequence  intravenous induction     Anesthetic plan, risks, benefits, and alternatives have been provided, discussed and informed consent has been obtained with: patient.    CODE STATUS:    Level Of Support Discussed With: Patient  Code Status (Patient has no pulse and is not breathing): CPR (Attempt to Resuscitate)  Medical Interventions (Patient has pulse or is breathing): Full Support

## 2023-09-10 NOTE — OP NOTE
COLON & RECTAL SURGERY  PROGRESS NOTE    June 6, 2021    SUBJECTIVE:    Doing well  Pain a little worse today  Ambulating  3 BM yesterday, all bloody  This morning she's had a few BMs, but at least one of them was more formed    OBJECTIVE:  Temp:  [97.3  F (36.3  C)-98.3  F (36.8  C)] 97.8  F (36.6  C)  Pulse:  [] 94  Resp:  [16-18] 18  BP: (105-121)/(66-85) 121/85  SpO2:  [91 %-94 %] 94 %    Intake/Output Summary (Last 24 hours) at 6/6/2021 0944  Last data filed at 6/5/2021 2000  Gross per 24 hour   Intake 240 ml   Output 1500 ml   Net -1260 ml       GENERAL:  Awake, alert, no acute distress  EXTREMITIES: Warm and well perfused, no edema   ABDOMEN:  Soft, appropriately tender, non-distended. No guarding, rigidity, or peritoneal signs.     LABS:  Lab Results   Component Value Date    WBC 8.7 06/06/2021     Lab Results   Component Value Date    HGB 12.4 06/06/2021     Lab Results   Component Value Date    HCT 38.7 06/06/2021     Lab Results   Component Value Date     06/06/2021     Last Basic Metabolic Panel:  Lab Results   Component Value Date     06/06/2021      Lab Results   Component Value Date    POTASSIUM 4.1 06/06/2021     Lab Results   Component Value Date    CHLORIDE 102 06/06/2021     Lab Results   Component Value Date    PATRICIA 8.8 06/06/2021     Lab Results   Component Value Date    CO2 29 06/06/2021     Lab Results   Component Value Date    BUN 19 06/06/2021     Lab Results   Component Value Date    CR 0.80 06/06/2021     Lab Results   Component Value Date     06/06/2021       ASSESSMENT/PLAN: 45F hx medically refractory UC with overlying C diff 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 CRS was consulted to evaluate for TAC w/ EI. At the moment, she does not have concerning features or toxic megacolon or evidence of perforation.      - Continue cares per primary and GI  - Outpatient discussion in colorectal  OPERATIVE REPORT     DATE OF OPERATION: 9/10/2023    SURGEON:   Josseline Ayon MD    ASSISTANT:   Rabia Lima, who was available for necessary retraction, suctioning, suturing and camera holding throughout the procedure    PREOPERATIVE DIAGNOSIS: Acute appendicitis    POSTOPERATIVE DIAGNOSIS: Acute gangrenous appendicitis    PROCEDURE PERFORMED: Laparoscopic appendectomy    ANESTHESIA: General    SPECIMEN: Appendix    DRAINS: None    BLOOD LOSS: Minimal    INDICATIONS FOR OPERATION: Mrs. Jana Nix is a 28 y.o. lady who presented to the ER with clinical and radiographic findings consistent with acute appendicitis.  Laparoscopic appendectomy was recommended.  All risks (including bleeding, infection, damage to surrounding structures), benefits, and alternatives were explained to the patient and she agreed and wished to proceed.  Informed consent was obtained.    OPERATIVE REPORT: The patient was taken to the operating room, transferred onto the operating room table, and underwent general endotracheal anesthesia without incident. The patient was prepped and draped in the usual sterile fashion.  Preoperative antibiotics were given, and a timeout was performed.  Half percent Marcaine with epinephrine was then injected into the skin and subcutaneous tissues.  An incision was made in the left upper quadrant Black's point.  The Optiview trocar with a 5 0 scope was inserted through each layer of the abdominal wall individually and into the abdomen.  The abdomen was insufflated.  No injuries were noted from insertion.  Due to the location of the cecum and appendix which was in the right upper quadrant, a 12 mm left mid abdominal trocar was placed and a 5 mm right lower quadrant trocar was placed.  The patient was placed head up and right side up.  The appendix was retrocecal and was identified.  It was retrocecal and heading posterior to the gallbladder and towards the duodenum.  It appeared gangrenous but not  clinic about surgical management      Will d/w Dr. Pagan.    Lowell Gould MD  CRS Fellow  6/6/2021   9:44 AM   perforated.  It was slowly dissected free.  The harmonic was used to come across the mesoappendix.  The [place] stapler was then used to take the appendix at its base right where it meets the cecum.  Once this was done, the bag was introduced and the appendix was removed from the abdomen.  The area was then irrigated and inspected for hemostasis.  There was no bleeding noted.  The ports were then removed under direct visualization.  The fascial sutures were tied. The incisions were then closed with 4-0 Vicryl sutures and Dermabond.  All needle and lap counts were correct at the end of the case. The patient was awoken from general endotracheal anesthesia and taken to the recovery area for further monitoring.    AGA SANCHEZ M.D.  General and Endoscopic Surgery  [facility]    [address], [address]  [place], [place], [address]  P: [phone]  F: [phone]

## 2023-09-10 NOTE — PLAN OF CARE
Problem: Adult Inpatient Plan of Care  Goal: Plan of Care Review  Outcome: Ongoing, Progressing  Flowsheets (Taken 9/10/2023 0258)  Plan of Care Reviewed With: patient  Outcome Evaluation: admitted from ER with c/o RLQ pain and nausea that started yesterday early morning, alert and oriented, pain 7/10, tachy at 111, afebrile, standby assist to the bathroom, significant other at bedside,  kept NPO, waiting for Dr underwood to call back for admission orders,   Goal Outcome Evaluation:  Plan of Care Reviewed With: patient           Outcome Evaluation: admitted from ER with c/o RLQ pain and nausea that started yesterday early morning, alert and oriented, pain 7/10, tachy at 111, afebrile, standby assist to the bathroom, significant other at bedside,  kept NPO, waiting for Dr underwood to call back for admission orders,

## 2023-09-10 NOTE — ED NOTES
Nursing report ED to floor  Jana Nix  28 y.o.  female    HPI :   Chief Complaint   Patient presents with    Abdominal Pain       Admitting doctor:   Josseline Ayon MD    Admitting diagnosis:   The primary encounter diagnosis was Acute appendicitis, unspecified acute appendicitis type. A diagnosis of Right sided abdominal pain was also pertinent to this visit.    Code status:   Current Code Status       Date Active Code Status Order ID Comments User Context       Prior            Allergies:   Patient has no known allergies.    Isolation:   No active isolations    Intake and Output    Intake/Output Summary (Last 24 hours) at 9/10/2023 0120  Last data filed at 9/10/2023 0042  Gross per 24 hour   Intake 1000 ml   Output --   Net 1000 ml       Weight:       09/09/23 2023   Weight: 98.4 kg (217 lb)       Most recent vitals:   Vitals:    09/09/23 2027 09/09/23 2103 09/09/23 2258 09/10/23 0039   BP: (!) 143/102 (!) 160/105 117/67 117/67   BP Location: Right arm      Patient Position: Lying      Pulse:  92 93 100   Resp:  20 16 18   Temp:       SpO2:  97% 96% 92%   Weight:       Height:           Active LDAs/IV Access:   Lines, Drains & Airways       Active LDAs       Name Placement date Placement time Site Days    Peripheral IV Right Antecubital --  --  Antecubital  --                    Labs (abnormal labs have a star):   Labs Reviewed   COMPREHENSIVE METABOLIC PANEL - Abnormal; Notable for the following components:       Result Value    Glucose 114 (*)     All other components within normal limits    Narrative:     GFR Normal >60  Chronic Kidney Disease <60  Kidney Failure <15     URINALYSIS W/ MICROSCOPIC IF INDICATED (NO CULTURE) - Abnormal; Notable for the following components:    Leuk Esterase, UA Small (1+) (*)     All other components within normal limits   CBC WITH AUTO DIFFERENTIAL - Abnormal; Notable for the following components:    Neutrophil % 78.0 (*)     Lymphocyte % 10.8 (*)     Neutrophils,  Absolute 7.76 (*)     All other components within normal limits   URINALYSIS, MICROSCOPIC ONLY - Abnormal; Notable for the following components:    WBC, UA 6-12 (*)     Bacteria, UA 1+ (*)     Squamous Epithelial Cells, UA 3-6 (*)     All other components within normal limits   LIPASE - Normal   HCG, SERUM, QUALITATIVE - Normal   RAINBOW DRAW    Narrative:     The following orders were created for panel order [place] Draw.  Procedure                               Abnormality         Status                     ---------                               -----------         ------                     Green Top (Gel)[[id number]]                                  Final result               Lavender Top[[id number]]                                     Final result               Gold Top - SST[[id number]]                                                              Light Blue Top[[id number]]                                   Final result                 Please view results for these tests on the individual orders.   CBC AND DIFFERENTIAL    Narrative:     The following orders were created for panel order CBC & Differential.  Procedure                               Abnormality         Status                     ---------                               -----------         ------                     CBC Auto Differential[[id number]]        Abnormal            Final result                 Please view results for these tests on the individual orders.   GREEN TOP   LAVENDER TOP   LIGHT BLUE TOP       EKG:   No orders to display       Meds given in ED:   Medications   sodium chloride 0.9 % flush 10 mL (has no administration in time range)   lactated ringers bolus 1,000 mL (0 mL Intravenous Stopped 9/10/23 0042)   ondansetron (ZOFRAN) injection 4 mg (4 mg Intravenous Given 9/9/23 2152)   morphine injection 4 mg (4 mg Intravenous Given 9/9/23 2152)   iopamidol (ISOVUE-300) 61 % injection 100 mL (85 mL Intravenous Given 9/9/23 2324)    HYDROmorphone (DILAUDID) injection 0.5 mg (0.5 mg Intravenous Given 9/9/23 2210)   piperacillin-tazobactam (ZOSYN) 3.375 g in iso-osmotic dextrose 50 ml (premix) (3.375 g Intravenous New Bag 9/10/23 0042)       Imaging results:  No radiology results for the last day    Ambulatory status:   - ad rick    Social issues:   Social History     Socioeconomic History    Marital status: Single   Tobacco Use    Smoking status: Never    Smokeless tobacco: Never    Tobacco comments:     caffeine - rarely   Vaping Use    Vaping Use: Never used   Substance and Sexual Activity    Alcohol use: No     Comment: socially    Drug use: No    Sexual activity: Yes     Partners: Male     Birth control/protection: OCP       NIH Stroke Scale:       Zulma Pino RN  09/10/23 01:20 EDT

## 2023-09-10 NOTE — ANESTHESIA POSTPROCEDURE EVALUATION
Patient: Jana Nix    Procedure Summary       Date: 09/10/23 Room / Location: [facility] OR  / [facility] MAIN OR    Anesthesia Start: 1256 Anesthesia Stop: 1410    Procedure: APPENDECTOMY LAPAROSCOPIC (Abdomen) Diagnosis:     Surgeons: Josseline Ayon MD Provider: Timmy Samuels MD    Anesthesia Type: general ASA Status: 2            Anesthesia Type: general    Vitals  Vitals Value Taken Time   /76 09/10/23 1430   Temp 38 °C (100.4 °F) 09/10/23 1410   Pulse 102 09/10/23 1442   Resp 16 09/10/23 1430   SpO2 97 % 09/10/23 1442   Vitals shown include unvalidated device data.        Post Anesthesia Care and Evaluation    Patient location during evaluation: bedside  Patient participation: complete - patient participated  Level of consciousness: awake and alert  Pain management: adequate    Airway patency: patent  Anesthetic complications: No anesthetic complications  PONV Status: controlled  Cardiovascular status: blood pressure returned to baseline and acceptable  Respiratory status: acceptable  Hydration status: acceptable

## 2023-09-10 NOTE — ED PROVIDER NOTES
EMERGENCY DEPARTMENT ENCOUNTER    Room Number:  P695/1  PCP: Idalia Francisco APRN  Historian: Patient      HPI:  Chief Complaint: Right-sided abdominal pain  A complete HPI/ROS/PMH/PSH/SH/FH are unobtainable due to: Nothing  Context: Jana Nix is a 28 y.o. female who presents to the ED by private vehicle from home c/o right-sided abdominal pain that began this morning.  Pain is constant waxing and waning.  She reports associated nausea but denies vomiting, diarrhea, constipation, dysuria, hematuria, flank pain, or fever.  Nothing makes the pain better or worse.  She has had 3 C-sections.            PAST MEDICAL HISTORY  Active Ambulatory Problems     Diagnosis Date Noted    Mild persistent asthma without complication 10/26/2018    Asthma 02/15/2014    Palpitations 2015    Depressive disorder 2020    Ventricular premature beats 10/25/2017    Healthcare maintenance 2021    Migraine 2021     Resolved Ambulatory Problems     Diagnosis Date Noted    Previous  delivery affecting pregnancy 10/01/2018    Pre-eclampsia, antepartum 10/01/2018    Maternal atypical antibody complicating pregnancy 10/03/2018    Pre-eclampsia, antepartum 10/01/2018    H/O  section 2014    Pregnancy 2019    Elevated glucose 2019    Hx of preeclampsia, prior pregnancy, currently pregnant 2019    Antepartum anemia 2019    Maternal atypical antibody complicating pregnancy in third trimester 2019    History of postpartum depression 2019     delivery delivered 2019     Past Medical History:   Diagnosis Date    Allergic rhinitis     Anxiety     Chlamydia     Community acquired pneumonia     Depression     Mild dehydration     PVC (premature ventricular contraction)     Sinus tachycardia     Streptococcal pharyngitis          PAST SURGICAL HISTORY  Past Surgical History:   Procedure Laterality Date    APPENDECTOMY N/A 9/10/2023    Procedure:  APPENDECTOMY LAPAROSCOPIC;  Surgeon: Josseline Ayon MD;  Location: [facility] MAIN OR;  Service: General;  Laterality: N/A;     SECTION N/A     3 total     SECTION N/A 2019    Procedure:  SECTION REPEAT;  Surgeon: Angeline Patel MD;  Location: [facility] LABOR DELIVERY;  Service: Obstetrics/Gynecology         FAMILY HISTORY  Family History   Problem Relation Age of Onset    Hypertension Mother     Clotting disorder Paternal Grandfather     Breast cancer Other     Myocarditis Maternal Grandfather          SOCIAL HISTORY  Social History     Socioeconomic History    Marital status: Single   Tobacco Use    Smoking status: Never    Smokeless tobacco: Never    Tobacco comments:     caffeine - rarely   Vaping Use    Vaping Use: Never used   Substance and Sexual Activity    Alcohol use: Yes     Comment: socially    Drug use: No    Sexual activity: Yes     Partners: Male     Birth control/protection: OCP         ALLERGIES  Patient has no known allergies.    REVIEW OF SYSTEMS  All systems have been reviewed and are negative except for those listed in the HPI      PHYSICAL EXAM  ED Triage Vitals   Temp Heart Rate Resp BP SpO2   23   99.8 °F (37.7 °C) (!) 142 18 (!) 143/102 97 %      Temp src Heart Rate Source Patient Position BP Location FiO2 (%)   -- -- 23 --     Lying Right arm        Physical Exam      GENERAL: Awake, alert, oriented x3.  Well-developed female.  Appears uncomfortable.  BMI 38.4  HENT: NCAT, nares patent  EYES: no scleral icterus  CV: regular rhythm, normal rate  RESPIRATORY: normal effort, clear to auscultation bilaterally  ABDOMEN: soft, obese, there is tenderness in the mid right abdomen and right lower quadrant, no rebound or guarding, no CVA tenderness  MUSCULOSKELETAL: Extremities are nontender with full range of motion  NEURO: Speech is normal.  No facial droop.  PSYCH:  calm,  cooperative  SKIN: warm, dry    Vital signs and nursing notes reviewed.          LAB RESULTS  Recent Results (from the past 24 hour(s))   Basic Metabolic Panel    Collection Time: 09/10/23  7:58 AM    Specimen: Blood   Result Value Ref Range    Glucose 105 (H) 65 - 99 mg/dL    BUN 6 6 - 20 mg/dL    Creatinine 0.69 0.57 - 1.00 mg/dL    Sodium 137 136 - 145 mmol/L    Potassium 3.6 3.5 - 5.2 mmol/L    Chloride 102 98 - 107 mmol/L    CO2 25.3 22.0 - 29.0 mmol/L    Calcium 9.0 8.6 - 10.5 mg/dL    BUN/Creatinine Ratio 8.7 7.0 - 25.0    Anion Gap 9.7 5.0 - 15.0 mmol/L    eGFR 121.4 >60.0 mL/min/1.73   CBC Auto Differential    Collection Time: 09/10/23  7:58 AM    Specimen: Blood   Result Value Ref Range    WBC 10.84 (H) 3.40 - 10.80 10*3/mm3    RBC 4.68 3.77 - 5.28 10*6/mm3    Hemoglobin 13.0 12.0 - 15.9 g/dL    Hematocrit 38.5 34.0 - 46.6 %    MCV 82.3 79.0 - 97.0 fL    MCH 27.8 26.6 - 33.0 pg    MCHC 33.8 31.5 - 35.7 g/dL    RDW 12.9 12.3 - 15.4 %    RDW-SD 38.6 37.0 - 54.0 fl    MPV 10.6 6.0 - 12.0 fL    Platelets 208 140 - 450 10*3/mm3    Neutrophil % 73.1 42.7 - 76.0 %    Lymphocyte % 14.9 (L) 19.6 - 45.3 %    Monocyte % 10.4 5.0 - 12.0 %    Eosinophil % 0.9 0.3 - 6.2 %    Basophil % 0.1 0.0 - 1.5 %    Immature Grans % 0.6 (H) 0.0 - 0.5 %    Neutrophils, Absolute 7.93 (H) 1.70 - 7.00 10*3/mm3    Lymphocytes, Absolute 1.61 0.70 - 3.10 10*3/mm3    Monocytes, Absolute 1.13 (H) 0.10 - 0.90 10*3/mm3    Eosinophils, Absolute 0.10 0.00 - 0.40 10*3/mm3    Basophils, Absolute 0.01 0.00 - 0.20 10*3/mm3    Immature Grans, Absolute 0.06 (H) 0.00 - 0.05 10*3/mm3    nRBC 0.0 0.0 - 0.2 /100 WBC   Basic Metabolic Panel    Collection Time: 09/11/23  5:05 AM    Specimen: Blood   Result Value Ref Range    Glucose 148 (H) 65 - 99 mg/dL    BUN 8 6 - 20 mg/dL    Creatinine 0.58 0.57 - 1.00 mg/dL    Sodium 134 (L) 136 - 145 mmol/L    Potassium 4.1 3.5 - 5.2 mmol/L    Chloride 102 98 - 107 mmol/L    CO2 23.2 22.0 - 29.0 mmol/L    Calcium 8.5  (L) 8.6 - 10.5 mg/dL    BUN/Creatinine Ratio 13.8 7.0 - 25.0    Anion Gap 8.8 5.0 - 15.0 mmol/L    eGFR 126.6 >60.0 mL/min/1.73   CBC Auto Differential    Collection Time: 09/11/23  5:05 AM    Specimen: Blood   Result Value Ref Range    WBC 15.00 (H) 3.40 - 10.80 10*3/mm3    RBC 4.49 3.77 - 5.28 10*6/mm3    Hemoglobin 12.3 12.0 - 15.9 g/dL    Hematocrit 36.9 34.0 - 46.6 %    MCV 82.2 79.0 - 97.0 fL    MCH 27.4 26.6 - 33.0 pg    MCHC 33.3 31.5 - 35.7 g/dL    RDW 13.2 12.3 - 15.4 %    RDW-SD 39.6 37.0 - 54.0 fl    MPV 11.0 6.0 - 12.0 fL    Platelets 232 140 - 450 10*3/mm3    Neutrophil % 85.9 (H) 42.7 - 76.0 %    Lymphocyte % 7.0 (L) 19.6 - 45.3 %    Monocyte % 6.3 5.0 - 12.0 %    Eosinophil % 0.0 (L) 0.3 - 6.2 %    Basophil % 0.1 0.0 - 1.5 %    Immature Grans % 0.7 (H) 0.0 - 0.5 %    Neutrophils, Absolute 12.90 (H) 1.70 - 7.00 10*3/mm3    Lymphocytes, Absolute 1.05 0.70 - 3.10 10*3/mm3    Monocytes, Absolute 0.94 (H) 0.10 - 0.90 10*3/mm3    Eosinophils, Absolute 0.00 0.00 - 0.40 10*3/mm3    Basophils, Absolute 0.01 0.00 - 0.20 10*3/mm3    Immature Grans, Absolute 0.10 (H) 0.00 - 0.05 10*3/mm3    nRBC 0.0 0.0 - 0.2 /100 WBC       Ordered the above labs and reviewed the results.        RADIOLOGY  No Radiology Exams Resulted Within Past 24 Hours    Ordered the above noted radiological studies. Reviewed by me in PACS.            PROCEDURES  Procedures              MEDICATIONS GIVEN IN ER  Medications   sodium chloride 0.9 % flush 10 mL ( Intravenous MAR Unhold 9/10/23 1515)   HYDROmorphone (DILAUDID) injection 0.5 mg (0.5 mg Intravenous Given 9/11/23 0155)   ondansetron (ZOFRAN) injection 4 mg (4 mg Intravenous Given 9/10/23 2004)   sodium chloride 0.9 % flush 10 mL ( Intravenous Canceled Entry 9/11/23 0900)   sodium chloride 0.9 % flush 10 mL ( Intravenous MAR Unhold 9/10/23 1515)   sodium chloride 0.9 % infusion 40 mL ( Intravenous MAR Unhold 9/10/23 1515)   sennosides-docusate (PERICOLACE) 8.6-50 MG per tablet 2  tablet (2 tablets Oral Given 9/10/23 2004)     And   polyethylene glycol (MIRALAX) packet 17 g ( Oral MAR Unhold 9/10/23 1515)     And   bisacodyl (DULCOLAX) EC tablet 5 mg ( Oral MAR Unhold 9/10/23 1515)     And   bisacodyl (DULCOLAX) suppository 10 mg ( Rectal MAR Unhold 9/10/23 1515)   lactated ringers infusion ( Intravenous Restarted 9/10/23 1356)   escitalopram (LEXAPRO) tablet 10 mg (10 mg Oral Given 9/10/23 2250)   budesonide-formoterol (SYMBICORT) 160-4.5 MCG/ACT inhaler 1 puff (1 puff Inhalation Given 9/10/23 2057)   montelukast (SINGULAIR) tablet 5 mg (5 mg Oral Given 9/10/23 2250)   traMADol (ULTRAM) tablet 50 mg (50 mg Oral Given 9/10/23 2249)   albuterol (PROVENTIL) nebulizer solution 0.083% 2.5 mg/3mL (has no administration in time range)   piperacillin-tazobactam (ZOSYN) 3.375 g in iso-osmotic dextrose 50 ml (premix) (3.375 g Intravenous New Bag 9/11/23 0417)   HYDROcodone-acetaminophen (NORCO) 5-325 MG per tablet 1 tablet (has no administration in time range)   lactated ringers bolus 1,000 mL (0 mL Intravenous Stopped 9/10/23 0042)   ondansetron (ZOFRAN) injection 4 mg (4 mg Intravenous Given 9/9/23 2152)   morphine injection 4 mg (4 mg Intravenous Given 9/9/23 2152)   iopamidol (ISOVUE-300) 61 % injection 100 mL (85 mL Intravenous Given 9/9/23 2324)   HYDROmorphone (DILAUDID) injection 0.5 mg (0.5 mg Intravenous Given 9/9/23 2340)   piperacillin-tazobactam (ZOSYN) 3.375 g in iso-osmotic dextrose 50 ml (premix) (0 g Intravenous Stopped 9/10/23 0156)   famotidine (PEPCID) injection 20 mg (20 mg Intravenous Given 9/10/23 1235)   fentaNYL citrate (PF) (SUBLIMAZE) injection 50 mcg (50 mcg Intravenous Given 9/10/23 1233)   piperacillin-tazobactam (ZOSYN) 3.375 g in iso-osmotic dextrose 50 ml (premix) (3.375 g Intravenous New Bag 9/10/23 6522)                   MEDICAL DECISION MAKING, PROGRESS, and CONSULTS    All labs have been independently reviewed by me.  All radiology studies have been reviewed by me  and I have also reviewed the radiology report.   EKG's independently viewed and interpreted by me.  Discussion below represents my analysis of pertinent findings related to patient's condition, differential diagnosis, treatment plan and final disposition.      Additional sources:  - Discussed/ obtained information from independent historians: N/A    - External (non-ED) record review: CT abdomen/pelvis done on 5/16/2023 was normal.  Pelvic ultrasound showed a small fluid focus in the endocervical canal but was otherwise unremarkable.    - Chronic or social conditions impacting care: N/A          Orders placed during this visit:  Orders Placed This Encounter   Procedures    CT Abdomen Pelvis With Contrast    [place] Draw    Comprehensive Metabolic Panel    Lipase    Urinalysis With Microscopic If Indicated (No Culture) - Urine, Clean Catch    hCG, Serum, Qualitative    CBC Auto Differential    Urinalysis, Microscopic Only - Urine, Clean Catch    Basic Metabolic Panel    CBC Auto Differential    CBC Auto Differential    Diet: Gastrointestinal Diets; Low Irritant; Fluid Consistency: Thin (IDDSI 0)    Undress & Gown    Opioid Administration - Document EtCO2 and / or SpO2 With Each Set of Vitals & Any Change in Patient Status    Opioid Administration - Notify Provider Hypercapnic Monitoring    Vital Signs    Intake & Output    Weigh Patient    Oral Care    Place Sequential Compression Device    Maintain Sequential Compression Device    Activity - Ad Thea    Obtain Informed Consent    Pulse Oximetry, Continuous    Advance Diet As Tolerated -    Code Status and Medical Interventions:    Surgery (on-call MD unless specified)    Oxygen Therapy- Nasal Cannula; Titrate 1-6 LPM Per SpO2; 90 - 95%    Oxygen Therapy- Nasal Cannula; Titrate 1-6 LPM Per SpO2; 90 - 95%    Insert Peripheral IV    Insert Peripheral IV    Inpatient Admission    CBC & Differential    Green Top (Gel)    Lavender Top    Light Blue Top    CBC & Differential          Additional orders considered but not ordered:  N/A        Differential diagnosis:    Differential diagnosis includes but is not limited to:  - hepatobiliary pathology such as cholecystitis, cholangitis, and symptomatic cholelithiasis  - Pancreatitis  - Dyspepsia  - Small bowel obstruction  - Appendicitis  - Diverticulitis  - UTI including pyelonephritis  - Ureteral stone  - Zoster  - Colitis, including infectious and ischemic  - Atypical ACS        Independent interpretation of labs, radiology studies, and discussions with consultants:  ED Course as of 09/11/23 0742   Sat Sep 09, 2023   2136 WBC: 9.94 [WH]   2136 Hemoglobin: 13.6 [WH]   2221 HCG Qualitative: Negative [WH]   2310 BP: 117/67 [WH]   2310 Heart Rate: 93 [WH]   2328 CT abdomen/pelvis personally interpreted by me.  My personal interpretation is: Lung bases are clear.  No obstructive uropathy.  No bowel obstruction. [WH]   2331 Lab results discussed with the patient.  She still complaining of pain.  CT is pending.  She will be given another dose of pain medication.  Pelvic ultrasound will be ordered. [WH]   2336 Patient's care was turned over to Dr. Willis.  CT and pelvic ultrasound are pending. []      ED Course User Index  [] Zeke Chaidez MD               DIAGNOSIS  Final diagnoses:   Right sided abdominal pain   Acute appendicitis, unspecified acute appendicitis type         DISPOSITION  ADMISSION    Discussed treatment plan and reason for admission with pt/family and admitting physician.  Pt/family voiced understanding of the plan for admission for further testing/treatment as needed.               Latest Documented Vital Signs:  As of 07:42 EDT  BP- 102/64 HR- 65 Temp- 97 °F (36.1 °C) (Oral) O2 sat- 96%              --    Please note that portions of this were completed with a voice recognition program.       Note Disclaimer: At [facility], we believe that sharing information builds trust and better relationships. You are  receiving this note because you are receiving care at [facility] or recently visited. It is possible you will see health information before a provider has talked with you about it. This kind of information can be easy to misunderstand. To help you fully understand what it means for your health, we urge you to discuss this note with your provider.             Zeke Chaidez MD  09/11/23 0723

## 2023-09-11 LAB
ANION GAP SERPL CALCULATED.3IONS-SCNC: 8.8 MMOL/L (ref 5–15)
BASOPHILS # BLD AUTO: 0.01 10*3/MM3 (ref 0–0.2)
BASOPHILS NFR BLD AUTO: 0.1 % (ref 0–1.5)
BUN SERPL-MCNC: 8 MG/DL (ref 6–20)
BUN/CREAT SERPL: 13.8 (ref 7–25)
CALCIUM SPEC-SCNC: 8.5 MG/DL (ref 8.6–10.5)
CHLORIDE SERPL-SCNC: 102 MMOL/L (ref 98–107)
CO2 SERPL-SCNC: 23.2 MMOL/L (ref 22–29)
CREAT SERPL-MCNC: 0.58 MG/DL (ref 0.57–1)
DEPRECATED RDW RBC AUTO: 39.6 FL (ref 37–54)
EGFRCR SERPLBLD CKD-EPI 2021: 126.6 ML/MIN/1.73
EOSINOPHIL # BLD AUTO: 0 10*3/MM3 (ref 0–0.4)
EOSINOPHIL NFR BLD AUTO: 0 % (ref 0.3–6.2)
ERYTHROCYTE [DISTWIDTH] IN BLOOD BY AUTOMATED COUNT: 13.2 % (ref 12.3–15.4)
GLUCOSE SERPL-MCNC: 148 MG/DL (ref 65–99)
HCT VFR BLD AUTO: 36.9 % (ref 34–46.6)
HGB BLD-MCNC: 12.3 G/DL (ref 12–15.9)
IMM GRANULOCYTES # BLD AUTO: 0.1 10*3/MM3 (ref 0–0.05)
IMM GRANULOCYTES NFR BLD AUTO: 0.7 % (ref 0–0.5)
LYMPHOCYTES # BLD AUTO: 1.05 10*3/MM3 (ref 0.7–3.1)
LYMPHOCYTES NFR BLD AUTO: 7 % (ref 19.6–45.3)
MCH RBC QN AUTO: 27.4 PG (ref 26.6–33)
MCHC RBC AUTO-ENTMCNC: 33.3 G/DL (ref 31.5–35.7)
MCV RBC AUTO: 82.2 FL (ref 79–97)
MONOCYTES # BLD AUTO: 0.94 10*3/MM3 (ref 0.1–0.9)
MONOCYTES NFR BLD AUTO: 6.3 % (ref 5–12)
NEUTROPHILS NFR BLD AUTO: 12.9 10*3/MM3 (ref 1.7–7)
NEUTROPHILS NFR BLD AUTO: 85.9 % (ref 42.7–76)
NRBC BLD AUTO-RTO: 0 /100 WBC (ref 0–0.2)
PLATELET # BLD AUTO: 232 10*3/MM3 (ref 140–450)
PMV BLD AUTO: 11 FL (ref 6–12)
POTASSIUM SERPL-SCNC: 4.1 MMOL/L (ref 3.5–5.2)
RBC # BLD AUTO: 4.49 10*6/MM3 (ref 3.77–5.28)
SODIUM SERPL-SCNC: 134 MMOL/L (ref 136–145)
WBC NRBC COR # BLD: 15 10*3/MM3 (ref 3.4–10.8)

## 2023-09-11 PROCEDURE — 25010000002 PIPERACILLIN SOD-TAZOBACTAM PER 1 G: Performed by: STUDENT IN AN ORGANIZED HEALTH CARE EDUCATION/TRAINING PROGRAM

## 2023-09-11 PROCEDURE — G0378 HOSPITAL OBSERVATION PER HR: HCPCS

## 2023-09-11 PROCEDURE — 99024 POSTOP FOLLOW-UP VISIT: CPT | Performed by: STUDENT IN AN ORGANIZED HEALTH CARE EDUCATION/TRAINING PROGRAM

## 2023-09-11 PROCEDURE — 25010000002 HYDROMORPHONE PER 4 MG: Performed by: STUDENT IN AN ORGANIZED HEALTH CARE EDUCATION/TRAINING PROGRAM

## 2023-09-11 PROCEDURE — 94799 UNLISTED PULMONARY SVC/PX: CPT

## 2023-09-11 PROCEDURE — 94664 DEMO&/EVAL PT USE INHALER: CPT

## 2023-09-11 PROCEDURE — 94761 N-INVAS EAR/PLS OXIMETRY MLT: CPT

## 2023-09-11 PROCEDURE — 85025 COMPLETE CBC W/AUTO DIFF WBC: CPT | Performed by: STUDENT IN AN ORGANIZED HEALTH CARE EDUCATION/TRAINING PROGRAM

## 2023-09-11 PROCEDURE — 80048 BASIC METABOLIC PNL TOTAL CA: CPT | Performed by: STUDENT IN AN ORGANIZED HEALTH CARE EDUCATION/TRAINING PROGRAM

## 2023-09-11 RX ORDER — HYDROCODONE BITARTRATE AND ACETAMINOPHEN 5; 325 MG/1; MG/1
1 TABLET ORAL EVERY 6 HOURS PRN
Status: DISCONTINUED | OUTPATIENT
Start: 2023-09-11 | End: 2023-09-12 | Stop reason: HOSPADM

## 2023-09-11 RX ADMIN — PIPERACILLIN SODIUM AND TAZOBACTAM SODIUM 3.38 G: 3; .375 INJECTION, SOLUTION INTRAVENOUS at 20:04

## 2023-09-11 RX ADMIN — BUDESONIDE AND FORMOTEROL FUMARATE DIHYDRATE 1 PUFF: 160; 4.5 AEROSOL RESPIRATORY (INHALATION) at 20:17

## 2023-09-11 RX ADMIN — HYDROCODONE BITARTRATE AND ACETAMINOPHEN 1 TABLET: 5; 325 TABLET ORAL at 20:21

## 2023-09-11 RX ADMIN — HYDROCODONE BITARTRATE AND ACETAMINOPHEN 1 TABLET: 5; 325 TABLET ORAL at 14:00

## 2023-09-11 RX ADMIN — HYDROMORPHONE HYDROCHLORIDE 0.5 MG: 1 INJECTION, SOLUTION INTRAMUSCULAR; INTRAVENOUS; SUBCUTANEOUS at 01:55

## 2023-09-11 RX ADMIN — PIPERACILLIN SODIUM AND TAZOBACTAM SODIUM 3.38 G: 3; .375 INJECTION, SOLUTION INTRAVENOUS at 11:56

## 2023-09-11 RX ADMIN — PIPERACILLIN SODIUM AND TAZOBACTAM SODIUM 3.38 G: 3; .375 INJECTION, SOLUTION INTRAVENOUS at 04:17

## 2023-09-11 RX ADMIN — SENNOSIDES AND DOCUSATE SODIUM 2 TABLET: 50; 8.6 TABLET ORAL at 20:04

## 2023-09-11 RX ADMIN — ESCITALOPRAM OXALATE 10 MG: 10 TABLET, FILM COATED ORAL at 20:03

## 2023-09-11 RX ADMIN — MONTELUKAST SODIUM 5 MG: 10 TABLET, FILM COATED ORAL at 20:03

## 2023-09-11 RX ADMIN — SENNOSIDES AND DOCUSATE SODIUM 2 TABLET: 50; 8.6 TABLET ORAL at 08:00

## 2023-09-11 RX ADMIN — BUDESONIDE AND FORMOTEROL FUMARATE DIHYDRATE 1 PUFF: 160; 4.5 AEROSOL RESPIRATORY (INHALATION) at 10:01

## 2023-09-11 RX ADMIN — DEXTROSE AND SODIUM CHLORIDE 100 ML/HR: 5; 450 INJECTION, SOLUTION INTRAVENOUS at 01:55

## 2023-09-11 RX ADMIN — HYDROCODONE BITARTRATE AND ACETAMINOPHEN 1 TABLET: 5; 325 TABLET ORAL at 07:59

## 2023-09-11 NOTE — PLAN OF CARE
Goal Outcome Evaluation:  Plan of Care Reviewed With: patient        Progress: improving  Outcome Evaluation: Lap appy yesterday. Lap sites x3 intact with dermabond. Afebrile and VS stable. voiding without difficulty. Taking po without nausea. Had refused to walk in halls but will see if she will this am. Using IS--Needs encouragement. Hx of asthma and decreased breath sounds. Medicated with dilaudid for pain which appears to be controlling pain.

## 2023-09-11 NOTE — PROGRESS NOTES
Continued Stay Note  [facility]     Patient Name: Jana Nix  MRN: [id number]  Today's Date: 9/11/2023    Admit Date: 9/9/2023    Plan: Home no needs   Discharge Plan       Row Name 09/11/23 1680       Plan    Plan Home no needs    Plan Comments Introduced self and role of CCP. Patient confirmed DC plan is to return home. Patient stated she is independent with ADL's and uses no DMEs.  Family will assist as needed and will provide transportation at DC. Denies any needs/equipment.                   Discharge Codes    No documentation.                       Qiana Leary RN

## 2023-09-11 NOTE — PROGRESS NOTES
General Surgery     POD1 s/p lap appy for necrotizing appendicitis     Some issues with pain control. Tolerating minimal PO. No N/V. Has not been out of bed other than the bathroom.     Laying in bed  Incisions clean and dry     WBC 15    Plan:   Diet as tolerated  PRN pain and nausea meds  OOB and ambulate  Continue IV abx  Plan for home tomorrow     Josseline Ayon MD

## 2023-09-11 NOTE — PLAN OF CARE
Goal Outcome Evaluation:  Plan of Care Reviewed With: patient        Progress: improving  Outcome Evaluation: Lap sites CD&I. Tolerating GI diet. Voiding freely. IV antibiotic given. Patient ambulated in claire x 1 with assistance and encouragement. IS up to 1000 per pt report. Patient up in chair this afternoon, now back in bed. VSS on room air. Anticipate discharge home tomorrow.

## 2023-09-12 VITALS
HEART RATE: 75 BPM | DIASTOLIC BLOOD PRESSURE: 77 MMHG | TEMPERATURE: 97.1 F | WEIGHT: 216.93 LBS | OXYGEN SATURATION: 91 % | BODY MASS INDEX: 38.44 KG/M2 | HEIGHT: 63 IN | SYSTOLIC BLOOD PRESSURE: 116 MMHG | RESPIRATION RATE: 16 BRPM

## 2023-09-12 LAB
ANION GAP SERPL CALCULATED.3IONS-SCNC: 8.7 MMOL/L (ref 5–15)
BASOPHILS # BLD AUTO: 0.01 10*3/MM3 (ref 0–0.2)
BASOPHILS NFR BLD AUTO: 0.1 % (ref 0–1.5)
BUN SERPL-MCNC: 13 MG/DL (ref 6–20)
BUN/CREAT SERPL: 19.4 (ref 7–25)
CALCIUM SPEC-SCNC: 8.3 MG/DL (ref 8.6–10.5)
CHLORIDE SERPL-SCNC: 103 MMOL/L (ref 98–107)
CO2 SERPL-SCNC: 27.3 MMOL/L (ref 22–29)
CREAT SERPL-MCNC: 0.67 MG/DL (ref 0.57–1)
DEPRECATED RDW RBC AUTO: 40.2 FL (ref 37–54)
EGFRCR SERPLBLD CKD-EPI 2021: 122.3 ML/MIN/1.73
EOSINOPHIL # BLD AUTO: 0.16 10*3/MM3 (ref 0–0.4)
EOSINOPHIL NFR BLD AUTO: 1.9 % (ref 0.3–6.2)
ERYTHROCYTE [DISTWIDTH] IN BLOOD BY AUTOMATED COUNT: 13.2 % (ref 12.3–15.4)
GLUCOSE SERPL-MCNC: 89 MG/DL (ref 65–99)
HCT VFR BLD AUTO: 34.2 % (ref 34–46.6)
HGB BLD-MCNC: 11.4 G/DL (ref 12–15.9)
IMM GRANULOCYTES # BLD AUTO: 0.04 10*3/MM3 (ref 0–0.05)
IMM GRANULOCYTES NFR BLD AUTO: 0.5 % (ref 0–0.5)
LAB AP CASE REPORT: NORMAL
LAB AP DIAGNOSIS COMMENT: NORMAL
LYMPHOCYTES # BLD AUTO: 2.36 10*3/MM3 (ref 0.7–3.1)
LYMPHOCYTES NFR BLD AUTO: 27.4 % (ref 19.6–45.3)
MCH RBC QN AUTO: 27.9 PG (ref 26.6–33)
MCHC RBC AUTO-ENTMCNC: 33.3 G/DL (ref 31.5–35.7)
MCV RBC AUTO: 83.6 FL (ref 79–97)
MONOCYTES # BLD AUTO: 0.6 10*3/MM3 (ref 0.1–0.9)
MONOCYTES NFR BLD AUTO: 7 % (ref 5–12)
NEUTROPHILS NFR BLD AUTO: 5.43 10*3/MM3 (ref 1.7–7)
NEUTROPHILS NFR BLD AUTO: 63.1 % (ref 42.7–76)
NRBC BLD AUTO-RTO: 0 /100 WBC (ref 0–0.2)
PATH REPORT.FINAL DX SPEC: NORMAL
PATH REPORT.GROSS SPEC: NORMAL
PLATELET # BLD AUTO: 212 10*3/MM3 (ref 140–450)
PMV BLD AUTO: 10.9 FL (ref 6–12)
POTASSIUM SERPL-SCNC: 3.7 MMOL/L (ref 3.5–5.2)
RBC # BLD AUTO: 4.09 10*6/MM3 (ref 3.77–5.28)
SODIUM SERPL-SCNC: 139 MMOL/L (ref 136–145)
WBC NRBC COR # BLD: 8.6 10*3/MM3 (ref 3.4–10.8)

## 2023-09-12 PROCEDURE — 94799 UNLISTED PULMONARY SVC/PX: CPT

## 2023-09-12 PROCEDURE — 85025 COMPLETE CBC W/AUTO DIFF WBC: CPT | Performed by: STUDENT IN AN ORGANIZED HEALTH CARE EDUCATION/TRAINING PROGRAM

## 2023-09-12 PROCEDURE — 25010000002 PIPERACILLIN SOD-TAZOBACTAM PER 1 G: Performed by: STUDENT IN AN ORGANIZED HEALTH CARE EDUCATION/TRAINING PROGRAM

## 2023-09-12 PROCEDURE — G0378 HOSPITAL OBSERVATION PER HR: HCPCS

## 2023-09-12 PROCEDURE — 80048 BASIC METABOLIC PNL TOTAL CA: CPT | Performed by: STUDENT IN AN ORGANIZED HEALTH CARE EDUCATION/TRAINING PROGRAM

## 2023-09-12 PROCEDURE — 94664 DEMO&/EVAL PT USE INHALER: CPT

## 2023-09-12 RX ORDER — ONDANSETRON 4 MG/1
4 TABLET, FILM COATED ORAL EVERY 6 HOURS PRN
Qty: 10 TABLET | Refills: 0 | Status: SHIPPED | OUTPATIENT
Start: 2023-09-12 | End: 2023-09-18

## 2023-09-12 RX ORDER — HYDROCODONE BITARTRATE AND ACETAMINOPHEN 5; 325 MG/1; MG/1
1 TABLET ORAL EVERY 6 HOURS PRN
Qty: 10 TABLET | Refills: 0 | Status: SHIPPED | OUTPATIENT
Start: 2023-09-12

## 2023-09-12 RX ADMIN — ESCITALOPRAM OXALATE 10 MG: 10 TABLET, FILM COATED ORAL at 09:00

## 2023-09-12 RX ADMIN — PIPERACILLIN SODIUM AND TAZOBACTAM SODIUM 3.38 G: 3; .375 INJECTION, SOLUTION INTRAVENOUS at 04:04

## 2023-09-12 RX ADMIN — HYDROCODONE BITARTRATE AND ACETAMINOPHEN 1 TABLET: 5; 325 TABLET ORAL at 14:57

## 2023-09-12 RX ADMIN — HYDROCODONE BITARTRATE AND ACETAMINOPHEN 1 TABLET: 5; 325 TABLET ORAL at 09:05

## 2023-09-12 RX ADMIN — HYDROCODONE BITARTRATE AND ACETAMINOPHEN 1 TABLET: 5; 325 TABLET ORAL at 02:13

## 2023-09-12 RX ADMIN — BUDESONIDE AND FORMOTEROL FUMARATE DIHYDRATE 1 PUFF: 160; 4.5 AEROSOL RESPIRATORY (INHALATION) at 10:38

## 2023-09-12 RX ADMIN — SENNOSIDES AND DOCUSATE SODIUM 2 TABLET: 50; 8.6 TABLET ORAL at 09:00

## 2023-09-12 RX ADMIN — MONTELUKAST SODIUM 5 MG: 10 TABLET, FILM COATED ORAL at 09:00

## 2023-09-12 NOTE — DISCHARGE SUMMARY
DATE OF ADMIT:    9/9/2023    DATE OF DISCHARGE:    9/12/2023    DIAGNOSIS:    Acute gangrenous appendicitis    FINAL PATHOLOGY:    Pending    PROCEDURES:    Laparoscopic appendectomy 9/10/2023    RADIOGRAPHIC STUDIES SUMMARY:  CT abdomen pelvis 9/9/2023: Appendicitis, cecum located in right upper quadrant    LABORATORY SUMMARY:  9/12/2023: WBC 8.6, hemoglobin 11.4, electrolytes normal    SUMMARY OF HOSPITAL COURSE:     Admitted from emergency room with abdominal pain and diagnosis of acute appendicitis.  Uneventful postop course. Tolerating diet, incisions in good order and afebrile with stable vital signs at discharge. Prescribed hydrocodone as needed for pain and Zofran as needed for nausea.    DIET: Regular    ACTIVITY: Walking encouraged, no lifting or strenuous activity    MEDICATIONS: No changes made to preadmission medication list    FOLLOW-UP: To call office and schedule 1 week follow-up appointment    Damian Stark M.D.

## 2023-09-12 NOTE — PLAN OF CARE
Goal Outcome Evaluation:  Plan of Care Reviewed With: patient        Progress: improving  Outcome Evaluation: VSS. PO pain medication given with some relief. Lap sites CDI. Tolerating GI diet. Voiding freely. Ambulated in claire with assistance and encouragement. SCDs in use. Using IS.

## 2023-09-13 NOTE — PROGRESS NOTES
Case Management Discharge Note      Final Note: Discharged home. Qiana Leary RN         Selected Continued Care - Discharged on 9/12/2023 Admission date: 9/9/2023 - Discharge disposition: Home or Self Care   Transportation Services  Private: Car    Final Discharge Disposition Code: 01 - home or self-care

## 2023-09-18 ENCOUNTER — OFFICE VISIT (OUTPATIENT)
Dept: SURGERY | Facility: CLINIC | Age: 28
End: 2023-09-18
Payer: COMMERCIAL

## 2023-09-18 VITALS — HEIGHT: 63 IN | BODY MASS INDEX: 38.66 KG/M2 | WEIGHT: 218.2 LBS

## 2023-09-18 DIAGNOSIS — K35.30 ACUTE APPENDICITIS WITH LOCALIZED PERITONITIS, WITHOUT PERFORATION, ABSCESS, OR GANGRENE: Primary | ICD-10-CM

## 2023-09-18 PROCEDURE — 99024 POSTOP FOLLOW-UP VISIT: CPT | Performed by: STUDENT IN AN ORGANIZED HEALTH CARE EDUCATION/TRAINING PROGRAM

## 2023-09-18 NOTE — PROGRESS NOTES
General Surgery Post-Operative Follow Up Note     Summary:    Jana Nix is a 28 y.o. year old who presents for post-operative follow up from laparoscopic appendectomy. Doing well with no acute issues. Follow up as needed.      History of Present Illness:    Jana Nix is a 28 y.o. year old who presents for post-operative follow up.  She has done well since surgery.  Her pain is controlled and she is getting back to her daily activities.  She is slowly improving.  She has no concerns or complaints.    Procedure:    9/10/2023 Laparoscopic appendectomy     Pathology:    Final Diagnosis   1. Appendix, Appendectomy:  Benign appendix with               A. Acute appendicitis.               B. Serositis.        Physical Exam:   Constitutional: Well-developed well-nourished, no acute distress  Eyes: Conjunctiva normal, sclera nonicteric  ENMT: Hearing grossly normal, oral mucosa moist  Respiratory: No increased work of breathing, normal inspiratory effort  Cardiovascular: Regular rate, no peripheral edema, no jugular venous distention  Gastrointestinal: Soft, nontender  Skin:  Warm, dry, no rash on visualized skin surfaces, incisions clean and dry  Musculoskeletal: Symmetric strength, normal gait  Psychiatric: Alert and oriented ×3, normal affect       Josseline Ayon M.D.  General and Endoscopic Surgery  [facility]    [address], [address]  [place], [place], [address]  P: [phone]  F: [phone]

## 2024-01-29 ENCOUNTER — TELEPHONE (OUTPATIENT)
Dept: OBSTETRICS AND GYNECOLOGY | Age: 29
End: 2024-01-29
Payer: COMMERCIAL

## 2024-01-29 NOTE — TELEPHONE ENCOUNTER
Caller: Jana Nix    Relationship to patient: Self    Best call back number: [phone]    Patient is needing: PATIENT USED TO BE ESTABLISHED WITH DR. EPSTEIN AND HER IUD IS SET TO COME OUT IN MAY AND IS ALREADY GIVNG HER BLEEDING ISSUES AND DR EPSTEIN FIRST AVAILABLE IS IN JUNE.PATIENT WOULD LIKE TO BE SEEN IN THE NEXT COUPLE OF WEEKS OR WILL HAVE TO FIND ANOTHER

## 2024-01-31 ENCOUNTER — OFFICE VISIT (OUTPATIENT)
Dept: OBSTETRICS AND GYNECOLOGY | Age: 29
End: 2024-01-31
Payer: COMMERCIAL

## 2024-01-31 VITALS
WEIGHT: 220 LBS | BODY MASS INDEX: 38.98 KG/M2 | DIASTOLIC BLOOD PRESSURE: 72 MMHG | HEIGHT: 63 IN | SYSTOLIC BLOOD PRESSURE: 118 MMHG

## 2024-01-31 DIAGNOSIS — Z30.431 IUD CHECK UP: Primary | ICD-10-CM

## 2024-01-31 DIAGNOSIS — R53.83 OTHER FATIGUE: ICD-10-CM

## 2024-01-31 DIAGNOSIS — D50.8 OTHER IRON DEFICIENCY ANEMIA: ICD-10-CM

## 2024-01-31 DIAGNOSIS — N92.6 IRREGULAR BLEEDING: ICD-10-CM

## 2024-01-31 LAB
BASOPHILS # BLD AUTO: 0.03 10*3/MM3 (ref 0–0.2)
BASOPHILS NFR BLD AUTO: 0.4 % (ref 0–1.5)
EOSINOPHIL # BLD AUTO: 0.9 10*3/MM3 (ref 0–0.4)
EOSINOPHIL NFR BLD AUTO: 11.6 % (ref 0.3–6.2)
ERYTHROCYTE [DISTWIDTH] IN BLOOD BY AUTOMATED COUNT: 13 % (ref 12.3–15.4)
FERRITIN SERPL-MCNC: 69 NG/ML (ref 13–150)
HBA1C MFR BLD: 5.3 % (ref 4.8–5.6)
HCT VFR BLD AUTO: 42.5 % (ref 34–46.6)
HGB BLD-MCNC: 14.3 G/DL (ref 12–15.9)
IMM GRANULOCYTES # BLD AUTO: 0.03 10*3/MM3 (ref 0–0.05)
IMM GRANULOCYTES NFR BLD AUTO: 0.4 % (ref 0–0.5)
LYMPHOCYTES # BLD AUTO: 1.88 10*3/MM3 (ref 0.7–3.1)
LYMPHOCYTES NFR BLD AUTO: 24.3 % (ref 19.6–45.3)
MCH RBC QN AUTO: 27.4 PG (ref 26.6–33)
MCHC RBC AUTO-ENTMCNC: 33.6 G/DL (ref 31.5–35.7)
MCV RBC AUTO: 81.4 FL (ref 79–97)
MONOCYTES # BLD AUTO: 0.44 10*3/MM3 (ref 0.1–0.9)
MONOCYTES NFR BLD AUTO: 5.7 % (ref 5–12)
NEUTROPHILS # BLD AUTO: 4.45 10*3/MM3 (ref 1.7–7)
NEUTROPHILS NFR BLD AUTO: 57.6 % (ref 42.7–76)
NRBC BLD AUTO-RTO: 0 /100 WBC (ref 0–0.2)
PLATELET # BLD AUTO: 294 10*3/MM3 (ref 140–450)
RBC # BLD AUTO: 5.22 10*6/MM3 (ref 3.77–5.28)
TSH SERPL DL<=0.005 MIU/L-ACNC: 0.81 UIU/ML (ref 0.27–4.2)
WBC # BLD AUTO: 7.73 10*3/MM3 (ref 3.4–10.8)

## 2024-01-31 RX ORDER — LEVOCETIRIZINE DIHYDROCHLORIDE 5 MG/1
5 TABLET, FILM COATED ORAL EVERY EVENING
COMMUNITY

## 2024-01-31 NOTE — PROGRESS NOTES
"Subjective     Chief Complaint   Patient presents with    Gynecologic Exam     New/ re-establish care, pt last seen Dr Patel .  C/o abnormal bleeding, pt does have kyleena iud that was placed 2019.  Ultrasound done today.         Jana Nix is a 28 y.o.  whose LMP is No LMP recorded. Patient has had an implant. presents with iud check and irregular bleeding    Has the iud in place  Been in place since 2019    Is noting spotting and alternating with bleeding since beginning of January  Was having regular menses prior to that  Would sometimes only bleed for a day    Has liked the iud   Does wonder if it is impacting her ability to lose weight    Not good about taking pills  Had nexplanon and removed it d/t weight gain (60 lbs)    Denies STD risk    No Additional Complaints Reported    The following portions of the patient's history were reviewed and updated as appropriate:no additional history reviewed, vital signs, allergies, current medications, past medical history, past social history, past surgical history, and problem list      Review of Systems   Genitourinary:positive for irregular bleeding and fatigue     Objective      /72   Ht 160 cm (63\")   Wt 99.8 kg (220 lb)   BMI 38.97 kg/m²     Physical Exam    General:   alert, comfortable, and no distress   Heart: Not performed today   Lungs: Not performed today.   Breast: Not performed today   Neck: Not performed today   Abdomen: Not performed today   CVA: Not performed today   Pelvis: Not performed today   Extremities: Not performed today   Neurologic: negative   Psychiatric: Normal affect, judgement, and mood       Lab Review   Labs: NA    Imaging   Ultrasound - Pelvic Vaginal  1.  Uterus: Normal size, with uterine volume of 83 ml, with uterine dimensions 9 x 5 x 3 cm, and anteflexed     2.  Endometrium: Normal non-menopausal thickness and 3 mm  IUD in place     3.  Myometrium: Normal homogenous texture      4.  Ovaries          "    Left: Normal/unremarkable              Right: Normal/unremarkable     Assessment & Plan     ASSESSMENT  1. IUD check up    2. Irregular bleeding    3. Other iron deficiency anemia    4. Other fatigue          PLAN  1.   Orders Placed This Encounter   Procedures    TSH    Ferritin    Hemoglobin A1c    CBC & Differential       2. Labs ordered to assess irregular bleeding. Suspect it is r/t her IUD. Disc options to treat bleeding-bcp, remove/replace IUD. She does t hink she would like to do this. Disc switching to copper iud as well to completely remove hormones but can increase bleeding and cramping. Will stick with kyleena. Can place at any time. Will plan to do her annual exam at her IUD check     Follow up: SHEMAR Purvis  1/31/2024

## 2024-02-21 ENCOUNTER — OFFICE VISIT (OUTPATIENT)
Dept: OBSTETRICS AND GYNECOLOGY | Age: 29
End: 2024-02-21
Payer: COMMERCIAL

## 2024-02-21 VITALS — SYSTOLIC BLOOD PRESSURE: 122 MMHG | DIASTOLIC BLOOD PRESSURE: 74 MMHG | BODY MASS INDEX: 38.97 KG/M2 | HEIGHT: 63 IN

## 2024-02-21 DIAGNOSIS — Z30.430 ENCOUNTER FOR IUD INSERTION: Primary | ICD-10-CM

## 2024-02-21 DIAGNOSIS — Z30.431 IUD CHECK UP: ICD-10-CM

## 2024-02-21 DIAGNOSIS — Z30.432 ENCOUNTER FOR IUD REMOVAL: ICD-10-CM

## 2024-02-21 LAB
B-HCG UR QL: NEGATIVE
EXPIRATION DATE: NORMAL
INTERNAL NEGATIVE CONTROL: NORMAL
INTERNAL POSITIVE CONTROL: NORMAL
Lab: NORMAL

## 2024-02-21 NOTE — PROGRESS NOTES
IUD Removal    Date of procedure:  2/21/2024    Risks and benefits discussed? yes  All questions answered? yes  Consents given by The patient  Reason for removal: Device expiration        Procedure documentation:    A speculum was placed in order to view the cervix.  .  The IUD string was easily seen.  The string was grasped and the IUD was removed without difficulty.  The IUD did not appear to be adherent to the uterine cavity. It was removed intact.    She tolerated the procedure without any difficulty.     Post procedure instructions: Patient notified to call with heavy bleeding, fever or increasing pain.    Follow up needed: 6 week(s) for iud check        IUD Insertion    Patient's last menstrual period was 01/31/2024.    Date of procedure:  2/21/2024    Risks and benefits discussed? yes  All questions answered? yes  Consents given by The patient  Written consent obtained? yes    Procedure documentation:     Urine pregnancy test was done and was NEGATIVE .  The risks (including infection,  bleeding, pain, and uterine perforation) and benefits of the procedure were  explained to the patient and Written informed consent was  obtained.    After verifying the patient had a low probability of being pregnant and met the criteria for insertion, a sterile speculum has placed and the cervix was cleansed with an antiseptic solution.  Vaginal discharge was scant.  The anterior lip of the cervix was grasped with an allis and the uterine cavity was gently sounded. There was no difficulty passing the sound through the cervix.  Cervical dilation did not need to be performed prior to placing the IUD.  The uterus was retroverted and sounded to 9 cms.  The Kyleena was then prepared per the manufacturers instructions.    The Kyleena was advanced to a point 2 cms from the fundus and then the arms were released from the sheath.  The device was advanced to the fundus and the device was released fully from the sheath.. The string was  cut 2 cms in length.  Bleeding from the cervix was scant.    She tolerated the procedure without any difficulty.    Post procedure instructions: The patient was advised to call for any fever or for  prolonged or severe pain or bleeding. Pelvic rest  advised for 2 wks    Follow up needed: 6 weeks for IUD check    U/s done and IUD in place

## 2024-02-22 NOTE — PROGRESS NOTES
I have reviewed the notes, assessments, and/or procedures performed by SHEMAR Brady, I concur with her/his documentation of Jana Nix.

## 2024-03-21 ENCOUNTER — OFFICE VISIT (OUTPATIENT)
Dept: OBSTETRICS AND GYNECOLOGY | Age: 29
End: 2024-03-21
Payer: COMMERCIAL

## 2024-03-21 VITALS
WEIGHT: 218 LBS | BODY MASS INDEX: 38.62 KG/M2 | SYSTOLIC BLOOD PRESSURE: 118 MMHG | DIASTOLIC BLOOD PRESSURE: 74 MMHG | HEIGHT: 63 IN

## 2024-03-21 DIAGNOSIS — Z01.419 WELL WOMAN EXAM WITH ROUTINE GYNECOLOGICAL EXAM: Primary | ICD-10-CM

## 2024-03-21 DIAGNOSIS — Z30.431 IUD CHECK UP: ICD-10-CM

## 2024-03-21 DIAGNOSIS — Z12.4 ENCOUNTER FOR PAPANICOLAOU SMEAR FOR CERVICAL CANCER SCREENING: ICD-10-CM

## 2024-03-21 PROBLEM — T14.90XA TRAUMA: Status: ACTIVE | Noted: 2024-03-21
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
## 2024-03-25 LAB
CONV .: NORMAL
CYTOLOGIST CVX/VAG CYTO: NORMAL
CYTOLOGY CVX/VAG DOC CYTO: NORMAL
CYTOLOGY CVX/VAG DOC THIN PREP: NORMAL
DX ICD CODE: NORMAL
Lab: NORMAL
OTHER STN SPEC: NORMAL
STAT OF ADQ CVX/VAG CYTO-IMP: NORMAL

## 2024-07-17 ENCOUNTER — OFFICE VISIT (OUTPATIENT)
Dept: OBSTETRICS AND GYNECOLOGY | Age: 29
End: 2024-07-17
Payer: COMMERCIAL

## 2024-07-17 VITALS
WEIGHT: 183.6 LBS | SYSTOLIC BLOOD PRESSURE: 106 MMHG | HEIGHT: 63 IN | DIASTOLIC BLOOD PRESSURE: 70 MMHG | BODY MASS INDEX: 32.53 KG/M2

## 2024-07-17 DIAGNOSIS — Z97.5 IUD (INTRAUTERINE DEVICE) IN PLACE: Primary | ICD-10-CM

## 2024-07-17 NOTE — PROGRESS NOTES
"Subjective     Chief Complaint   Patient presents with    GYN FOLLOW UP     US preformed today, IUD check, Pt states she's having discomfort with intercourse       Jana Nix is a 29 y.o.  whose LMP is Patient's last menstrual period was 2024. presents for iud check    She has an iud in place and has been happy with it  Her partner was recently \"poked\" by the strings  This has not happened previously so she wanted to be sure that the iud has not migrated    She did have an u/s done today    She is doing well otherwise  Applying for a job at [employer]  Will find out tomorrow if she got it    No Additional Complaints Reported    The following portions of the patient's history were reviewed and updated as appropriate:no additional history reviewed, vital signs, allergies, current medications, past medical history, past social history, past surgical history, and problem list      Review of Systems   Genitourinary:positive for iud check     Objective      /70   Ht 160 cm (62.99\")   Wt 83.3 kg (183 lb 9.6 oz)   LMP 2024   BMI 32.53 kg/m²     Physical Exam    General:   alert, comfortable, and no distress   Heart: Not performed today   Lungs: Not performed today.   Breast: Not performed today   Neck: Not performed today   Abdomen: Not performed today   CVA: Not performed today   Pelvis: External genitalia: normal general appearance  Vaginal: normal mucosa without prolapse or lesions  Cervix: normal appearance, IUD string visualized, and iud string is short, approx .5 cm   Extremities: Not performed today   Neurologic: negative   Psychiatric: Normal affect, judgement, and mood       Lab Review   Labs: No data reviewed    Imaging   Ultrasound - Pelvic Vaginal  1.  Uterus: Normal size, with uterine volume of 75 ml, and with uterine dimensions 9 x 4 x 3 cm     2.  Endometrium: Normal non-menopausal thickness and IUD appears in normal position      3.  Myometrium: Normal homogenous " texture      4.  Ovaries             Left: Not visualized              Right: Normal/unremarkable   Assessment & Plan     ASSESSMENT  1. IUD (intrauterine device) in place          PLAN  1. U/s confirms IUD is in proper position. IUD string is seen and it is short. I did offer to trim it but it would make it more difficult to see and potentially remove the iud. She is going to monitor for now and let me know.     Follow up: SHEMAR Purvis  7/17/2024

## (undated) DEVICE — KENDALL SCD EXPRESS SLEEVES, KNEE LENGTH, MEDIUM: Brand: KENDALL SCD

## (undated) DEVICE — SOL IRR H2O BTL 1000ML STRL

## (undated) DEVICE — ENDOPOUCH RETRIEVER SPECIMEN RETRIEVAL BAGS: Brand: ENDOPOUCH RETRIEVER

## (undated) DEVICE — GLV SURG BIOGEL LTX PF 6 1/2

## (undated) DEVICE — ENDOCUT SCISSOR TIP, DISPOSABLE: Brand: RENEW

## (undated) DEVICE — ADHS SKIN SURG TISS VISC PREMIERPRO EXOFIN HI/VISC FAST/DRY

## (undated) DEVICE — LAPAROSCOPIC SMOKE FILTRATION SYSTEM: Brand: PALL LAPAROSHIELD® PLUS LAPAROSCOPIC SMOKE FILTRATION SYSTEM

## (undated) DEVICE — SUT VIC 0 CT 36IN J958H

## (undated) DEVICE — LOU LAP CHOLE: Brand: MEDLINE INDUSTRIES, INC.

## (undated) DEVICE — ENDOPATH XCEL UNIVERSAL TROCAR STABLILITY SLEEVES: Brand: ENDOPATH XCEL

## (undated) DEVICE — ENDOPATH XCEL BLADELESS TROCARS WITH STABILITY SLEEVES: Brand: ENDOPATH XCEL

## (undated) DEVICE — SUT MNCRYL PLS ANTIB UD 4/0 PS2 18IN

## (undated) DEVICE — ECHELON FLEX45 ENDOPATH STAPLER, ARTICULATING ENDOSCOPIC LINEAR CUTTER (NO CARTRIDGE): Brand: ECHELON ENDOPATH

## (undated) DEVICE — 2, DISPOSABLE SUCTION/IRRIGATOR WITH DISPOSABLE TIP: Brand: STRYKEFLOW

## (undated) DEVICE — SUT MNCRYL 0 CT1 36IN Y946H

## (undated) DEVICE — HARMONIC ACE +7 LAPAROSCOPIC SHEARS ADVANCED HEMOSTASIS 5MM DIAMETER 36CM SHAFT LENGTH  FOR USE WITH GRAY HAND PIECE ONLY: Brand: HARMONIC ACE

## (undated) DEVICE — DISPOSABLE MONOPOLAR ENDOSCOPIC CORD 10 FT. (3M): Brand: KIRWAN

## (undated) DEVICE — ANTIBACTERIAL UNDYED BRAIDED (POLYGLACTIN 910), SYNTHETIC ABSORBABLE SUTURE: Brand: COATED VICRYL

## (undated) DEVICE — 3M(TM) TEGADERM(TM) TRANSPARENT FILM DRESSING FRAME STYLE 1627: Brand: 3M™ TEGADERM™

## (undated) DEVICE — SUT VIC 3/0 CTI 36IN J944H

## (undated) DEVICE — APPL CHLORAPREP HI/LITE 26ML ORNG

## (undated) DEVICE — SUT VIC 0 CT1 36IN J946H

## (undated) DEVICE — LAPAROVUE VISIBILITY SYSTEM LAPAROSCOPIC SOLUTIONS: Brand: LAPAROVUE

## (undated) DEVICE — SUT VIC 0/0 UR6 27IN DYED J603H